# Patient Record
Sex: FEMALE | Race: WHITE | NOT HISPANIC OR LATINO | Employment: OTHER | URBAN - METROPOLITAN AREA
[De-identification: names, ages, dates, MRNs, and addresses within clinical notes are randomized per-mention and may not be internally consistent; named-entity substitution may affect disease eponyms.]

---

## 2017-01-05 RX ORDER — AMLODIPINE BESYLATE 5 MG/1
TABLET ORAL
Qty: 90 TABLET | Refills: 0 | Status: SHIPPED | OUTPATIENT
Start: 2017-01-05 | End: 2017-03-06 | Stop reason: SDUPTHER

## 2017-01-23 DIAGNOSIS — R42 VERTIGO: ICD-10-CM

## 2017-01-23 RX ORDER — MECLIZINE HCL 12.5 MG 12.5 MG/1
TABLET ORAL
Qty: 30 TABLET | Refills: 1 | Status: SHIPPED | OUTPATIENT
Start: 2017-01-23 | End: 2018-02-08 | Stop reason: SDUPTHER

## 2017-02-22 ENCOUNTER — OFFICE VISIT (OUTPATIENT)
Dept: INTERNAL MEDICINE | Facility: CLINIC | Age: 67
End: 2017-02-22
Payer: COMMERCIAL

## 2017-02-22 VITALS
HEART RATE: 83 BPM | WEIGHT: 257.94 LBS | DIASTOLIC BLOOD PRESSURE: 82 MMHG | SYSTOLIC BLOOD PRESSURE: 120 MMHG | RESPIRATION RATE: 18 BRPM | OXYGEN SATURATION: 96 % | BODY MASS INDEX: 42.98 KG/M2 | TEMPERATURE: 98 F | HEIGHT: 65 IN

## 2017-02-22 DIAGNOSIS — R50.9 FEVER, UNSPECIFIED FEVER CAUSE: Primary | ICD-10-CM

## 2017-02-22 DIAGNOSIS — J02.9 PHARYNGITIS, UNSPECIFIED ETIOLOGY: ICD-10-CM

## 2017-02-22 LAB
FLUAV AG SPEC QL IA: NEGATIVE
FLUBV AG SPEC QL IA: NEGATIVE
SPECIMEN SOURCE: NORMAL

## 2017-02-22 PROCEDURE — 87070 CULTURE OTHR SPECIMN AEROBIC: CPT

## 2017-02-22 PROCEDURE — 3079F DIAST BP 80-89 MM HG: CPT | Mod: S$GLB,,, | Performed by: INTERNAL MEDICINE

## 2017-02-22 PROCEDURE — 1160F RVW MEDS BY RX/DR IN RCRD: CPT | Mod: S$GLB,,, | Performed by: INTERNAL MEDICINE

## 2017-02-22 PROCEDURE — 87400 INFLUENZA A/B EACH AG IA: CPT | Mod: 59,PO

## 2017-02-22 PROCEDURE — 3074F SYST BP LT 130 MM HG: CPT | Mod: S$GLB,,, | Performed by: INTERNAL MEDICINE

## 2017-02-22 PROCEDURE — 99213 OFFICE O/P EST LOW 20 MIN: CPT | Mod: S$GLB,,, | Performed by: INTERNAL MEDICINE

## 2017-02-22 PROCEDURE — 99999 PR PBB SHADOW E&M-EST. PATIENT-LVL III: CPT | Mod: PBBFAC,,, | Performed by: INTERNAL MEDICINE

## 2017-02-22 RX ORDER — AZELASTINE 1 MG/ML
1 SPRAY, METERED NASAL 2 TIMES DAILY
Qty: 30 ML | Refills: 11 | Status: SHIPPED | OUTPATIENT
Start: 2017-02-22 | End: 2020-01-21

## 2017-02-22 NOTE — PROGRESS NOTES
HISTORY OF PRESENT ILLNESS:  PtSherry is a 66 y.o. female presents with complaint of sore throat that began yesterday AM.  Had her grandson that had Strept throat with her.  This am had chills.  Took tylenol this AM.  There is also influenza in her family now.      ROS:  GENERAL: No fever, chills, fatigability or weight loss.  SKIN: No rashes, itching or changes in color or texture of skin.  HEAD: No headaches or recent head trauma.  EARS: Denies ear pain, discharge or vertigo.  NOSE: No loss of smell, no epistaxis; positive postnasal drip.  MOUTH & THROAT: No hoarseness or change in voice. No excessive gum bleeding.  NODES: Denies swollen glands.  CHEST: Denies LEON, cyanosis, wheezing, cough and sputum production.  CARDIOVASCULAR: Denies chest pain, PND, orthopnea or reduced exercise tolerance.  ABDOMEN: Appetite fine. No weight loss. Denies constipation, diarrhea, abdominal pain, hematemesis or blood in stool.  URINARY: No flank pain, dysuria or hematuria.  PERIPHERAL VASCULAR: No claudication or cyanosis. No edema.  MUSCULOSKELETAL: No joint stiffness or swelling. Denies back pain.  NEUROLOGIC: Denies numbness    PE:   Vitals:   Vitals:    02/22/17 1146   BP: 120/82   Pulse: 83   Resp: 18   Temp: 98.3 °F (36.8 °C)     GENERAL: no acute distress, A&Ox3, comfortable.  Female with body mass index of 43   HEENT: tympanic membranes clear, nasal mucosa pink, positive congestion; no pharyngeal erythema or exudate  NECK: supple, no cervical lymphadenopathy, no thyromegaly; no supraclavicular nodes;   CHEST:  clear to auscultation bilaterally, no crackles or wheeze; no increased work of breathing;  CARDIOVASCULAR: regular rate and rhythm, no rubs, murmurs or gallops.  ABDOMEN: normal bowel sounds, soft non-tender, non-distended; no palpable organomegaly;   EXT: no clubbing, cyanosis or edema.     ASSESSMENT/PLAN:    Have explained to patient probable viral etiology.  Have suggested that she try cold-eeze lozenges.  She is to  follow-up if she develops any yellow or green sputum.    Fever, unspecified fever cause  -     THROAT SCREEN, RAPID  -     Influenza antigen Nasopharyngeal Swab; Future; Expected date: 2/22/17    Pharyngitis, unspecified etiology  -     THROAT SCREEN, RAPID  -     Influenza antigen Nasopharyngeal Swab; Future; Expected date: 2/22/17  -     Throat culture  -     azelastine (ASTELIN) 137 mcg (0.1 %) nasal spray; 1 spray (137 mcg total) by Nasal route 2 (two) times daily.  Dispense: 30 mL; Refill: 11      Call if condition changes or worsens.

## 2017-02-22 NOTE — MR AVS SNAPSHOT
[facility]  [address] [place] [address]  Phone: [phone]  Fax: [phone]                  Nancy Figueroa   2017 11:40 AM   Office Visit    Description:  Female : 1950   Provider:  Akilah Kumar MD   Department:  [facility]           Reason for Visit     Sore Throat           Diagnoses this Visit        Comments    Fever, unspecified fever cause    -  Primary     Pharyngitis, unspecified etiology                To Do List           Future Appointments        Provider Department Dept Phone    3/6/2017 9:20 AM Akilah Kumar MD [facility] [phone]      Goals (5 Years of Data)     None       These Medications        Disp Refills Start End    azelastine (ASTELIN) 137 mcg (0.1 %) nasal spray 30 mL 11 2017     1 spray (137 mcg total) by Nasal route 2 (two) times daily. - Nasal    Pharmacy: [facility] [address] Ph #: [phone]         [facility] On Call     [facility] On Call Nurse Care Line -  Assistance  Registered nurses in the [facility] provide clinical advisement, health education, appointment booking, and other advisory services.  Call for this free service at [phone].             Medications           Message regarding Medications     Verify the changes and/or additions to your medication regime listed below are the same as discussed with your clinician today.  If any of these changes or additions are incorrect, please notify your healthcare provider.        START taking these NEW medications        Refills    azelastine (ASTELIN) 137 mcg (0.1 %) nasal spray 11    Si spray (137 mcg total) by Nasal route 2 (two) times daily.    Class: Normal    Route: Nasal           Verify that the below list of medications is an accurate representation of the medications you are currently taking.  If none reported, the list may be blank. If  "incorrect, please contact your healthcare provider. Carry this list with you in case of emergency.           Current Medications     amlodipine (NORVASC) 5 MG tablet TAKE 1 TABLET DAILY    levothyroxine (SYNTHROID) 112 MCG tablet Take 1 tablet (112 mcg total) by mouth before breakfast.    losartan-hydrochlorothiazide 100-25 mg (HYZAAR) 100-25 mg per tablet Take 1 tablet by mouth once daily.    meclizine (ANTIVERT) 12.5 mg tablet 1 tablet 3-4x daily as needed.    azelastine (ASTELIN) 137 mcg (0.1 %) nasal spray 1 spray (137 mcg total) by Nasal route 2 (two) times daily.           Clinical Reference Information           Your Vitals Were     BP Pulse Temp Resp Height Weight    120/82 83 98.3 °F (36.8 °C) 18 5' 4.5" (1.638 m) 117 kg (257 lb 15 oz)    SpO2 BMI             96% 43.59 kg/m2         Blood Pressure          Most Recent Value    BP  120/82      Allergies as of 2/22/2017     No Known Allergies      Immunizations Administered on Date of Encounter - 2/22/2017     None      Orders Placed During Today's Visit      Normal Orders This Visit    Influenza antigen Nasopharyngeal Swab     Throat culture     THROAT SCREEN, RAPID     Future Labs/Procedures Expected by Expires    Influenza antigen Nasopharyngeal Swab  2/22/2017 2/22/2018      Language Assistance Services     ATTENTION: Language assistance services are available, free of charge. Please call [phone].      ATENCIÓN: Si habla nadira, tiene a walters disposición servicios gratuitos de asistencia lingüística. Llame al [phone].     CHÚ Ý: N?u b?n nói Ti?ng Vi?t, có các d?ch v? h? tr? ngôn ng? mi?n phí dành cho b?n. G?i s? [phone].         [facility] complies with applicable Federal civil rights laws and does not discriminate on the basis of race, color, national origin, age, disability, or sex.        "

## 2017-02-24 LAB — BACTERIA THROAT CULT: NORMAL

## 2017-02-28 RX ORDER — LEVOTHYROXINE SODIUM 112 UG/1
TABLET ORAL
Qty: 90 TABLET | Refills: 0 | Status: SHIPPED | OUTPATIENT
Start: 2017-02-28 | End: 2017-03-06 | Stop reason: SDUPTHER

## 2017-03-06 ENCOUNTER — OFFICE VISIT (OUTPATIENT)
Dept: INTERNAL MEDICINE | Facility: CLINIC | Age: 67
End: 2017-03-06
Payer: COMMERCIAL

## 2017-03-06 VITALS
WEIGHT: 259.06 LBS | SYSTOLIC BLOOD PRESSURE: 112 MMHG | HEIGHT: 65 IN | OXYGEN SATURATION: 99 % | BODY MASS INDEX: 43.16 KG/M2 | RESPIRATION RATE: 18 BRPM | DIASTOLIC BLOOD PRESSURE: 84 MMHG | HEART RATE: 78 BPM

## 2017-03-06 DIAGNOSIS — E03.9 HYPOTHYROIDISM, UNSPECIFIED TYPE: ICD-10-CM

## 2017-03-06 DIAGNOSIS — Z00.00 HEALTH CARE MAINTENANCE: ICD-10-CM

## 2017-03-06 DIAGNOSIS — E66.01 MORBID OBESITY WITH BMI OF 40.0-44.9, ADULT: ICD-10-CM

## 2017-03-06 DIAGNOSIS — I10 ESSENTIAL HYPERTENSION: Primary | ICD-10-CM

## 2017-03-06 PROCEDURE — 3074F SYST BP LT 130 MM HG: CPT | Mod: S$GLB,,, | Performed by: INTERNAL MEDICINE

## 2017-03-06 PROCEDURE — 90471 IMMUNIZATION ADMIN: CPT | Mod: S$GLB,,, | Performed by: INTERNAL MEDICINE

## 2017-03-06 PROCEDURE — 3079F DIAST BP 80-89 MM HG: CPT | Mod: S$GLB,,, | Performed by: INTERNAL MEDICINE

## 2017-03-06 PROCEDURE — 1160F RVW MEDS BY RX/DR IN RCRD: CPT | Mod: S$GLB,,, | Performed by: INTERNAL MEDICINE

## 2017-03-06 PROCEDURE — 90715 TDAP VACCINE 7 YRS/> IM: CPT | Mod: S$GLB,,, | Performed by: INTERNAL MEDICINE

## 2017-03-06 PROCEDURE — 99999 PR PBB SHADOW E&M-EST. PATIENT-LVL III: CPT | Mod: PBBFAC,,, | Performed by: INTERNAL MEDICINE

## 2017-03-06 PROCEDURE — 99213 OFFICE O/P EST LOW 20 MIN: CPT | Mod: 25,S$GLB,, | Performed by: INTERNAL MEDICINE

## 2017-03-06 RX ORDER — LOSARTAN POTASSIUM AND HYDROCHLOROTHIAZIDE 25; 100 MG/1; MG/1
1 TABLET ORAL DAILY
Qty: 90 TABLET | Refills: 1 | Status: SHIPPED | OUTPATIENT
Start: 2017-03-06 | End: 2017-07-26 | Stop reason: SDUPTHER

## 2017-03-06 RX ORDER — LEVOTHYROXINE SODIUM 112 UG/1
TABLET ORAL
Qty: 90 TABLET | Refills: 1 | Status: SHIPPED | OUTPATIENT
Start: 2017-03-06 | End: 2017-08-15 | Stop reason: SDUPTHER

## 2017-03-06 RX ORDER — AMLODIPINE BESYLATE 5 MG/1
5 TABLET ORAL DAILY
Qty: 90 TABLET | Refills: 1 | Status: SHIPPED | OUTPATIENT
Start: 2017-03-06 | End: 2017-07-26 | Stop reason: SDUPTHER

## 2017-03-06 NOTE — PROGRESS NOTES
HISTORY OF PRESENT ILLNESS:  Pt. is a 66 y.o. female presents for monitoring of her HTN, hypothyroidism, morbid obesity.  Her son has Gilbert's disease.  Her B/P is in control on current meds today.  She is due for Tdap.  She has lost 5 lbs since appt. 6/3/16.  She will starting Medicare with her birthday this year.    Lab Results   Component Value Date    WBC 5.46 05/19/2016    HGB 12.2 05/19/2016    HCT 38.5 05/19/2016     05/19/2016    CHOL 212 (H) 05/19/2016    TRIG 107 05/19/2016    HDL 55 05/19/2016    ALT 16 05/19/2016    AST 15 05/19/2016     05/19/2016    K 4.1 05/19/2016     05/19/2016    CREATININE 0.8 05/19/2016    BUN 14 05/19/2016    CO2 27 05/19/2016    TSH 2.710 07/01/2016     Lab Results   Component Value Date    LDLCALC 135.6 05/19/2016              ROS:  GENERAL: No fever, chills, fatigability or weight loss.  SKIN: No rashes, itching or changes in color or texture of skin.  HEAD: No headaches or recent head trauma.  EARS: Denies ear pain, discharge or vertigo.  NOSE: No loss of smell, no epistaxis or postnasal drip.  MOUTH & THROAT: No hoarseness or change in voice. No excessive gum bleeding.  NODES: Denies swollen glands.  CHEST: Denies LEON, cyanosis, wheezing, cough and sputum production.  CARDIOVASCULAR: Denies chest pain, PND, orthopnea or reduced exercise tolerance.  ABDOMEN: Appetite fine. No weight loss. Denies constipation, diarrhea, abdominal pain, hematemesis or blood in stool; occ GERD;  URINARY: No flank pain, dysuria or hematuria.  PERIPHERAL VASCULAR: No claudication or cyanosis. No edema.  MUSCULOSKELETAL: No joint stiffness or swelling. Denies back pain.  NEUROLOGIC: Denies numbness    PE:   Vitals:   Vitals:    03/06/17 0917   BP: 112/84   Pulse: 78   Resp: 18     GENERAL: no acute distress, A&Ox3, comfortable.  Female with BMI of 43  HEENT: tympanic membranes clear, nasal mucosa pink, no pharyngeal erythema or exudate  NECK: supple, no cervical  lymphadenopathy, no thyromegaly; no supraclavicular nodes;   CHEST:  clear to auscultation bilaterally, no crackles or wheeze; no increased work of breathing;  CARDIOVASCULAR: regular rate and rhythm, no rubs, murmurs or gallops.  ABDOMEN: normal bowel sounds, soft non-tender, non-distended; no palpable organomegaly;   EXT: no clubbing, cyanosis or edema.     ASSESSMENT/PLAN:    Essential hypertension  -     amlodipine (NORVASC) 5 MG tablet; Take 1 tablet (5 mg total) by mouth once daily.  Dispense: 90 tablet; Refill: 1  -     losartan-hydrochlorothiazide 100-25 mg (HYZAAR) 100-25 mg per tablet; Take 1 tablet by mouth once daily.  Dispense: 90 tablet; Refill: 1    Hypothyroidism, unspecified type  -     levothyroxine (SYNTHROID) 112 MCG tablet; TAKE 1 TABLET BEFORE BREAKFAST  Dispense: 90 tablet; Refill: 1    Morbid Obesity: stressed and encouraged weight loss;    Call if condition changes or worsens.

## 2017-04-22 PROBLEM — R20.0 LEFT ARM NUMBNESS: Status: ACTIVE | Noted: 2017-04-22

## 2017-04-22 PROBLEM — R07.9 CHEST PAIN WITH MODERATE RISK FOR CARDIAC ETIOLOGY: Status: ACTIVE | Noted: 2017-04-22

## 2017-04-22 PROBLEM — R07.9 CHEST PAIN: Status: ACTIVE | Noted: 2017-04-22

## 2017-04-23 PROBLEM — E87.6 ACUTE HYPOKALEMIA: Status: ACTIVE | Noted: 2017-04-23

## 2017-04-24 PROBLEM — I21.4 NSTEMI (NON-ST ELEVATED MYOCARDIAL INFARCTION): Status: ACTIVE | Noted: 2017-04-24

## 2017-04-25 ENCOUNTER — TELEPHONE (OUTPATIENT)
Dept: FAMILY MEDICINE | Facility: CLINIC | Age: 67
End: 2017-04-25

## 2017-04-25 NOTE — TELEPHONE ENCOUNTER
No, she needs a full 40 minute slot.  She should have had a TCC followup at [facility].  Also, when does she have followup with cards.

## 2017-04-25 NOTE — TELEPHONE ENCOUNTER
----- Message from Kim Motta sent at 4/25/2017 11:38 AM CDT -----  Patient is being released today from [facility] and needs a hospital follow up due to a heart attack and stents. Please call patient back at [phone]. The earliest I could find was in July.

## 2017-05-01 PROBLEM — I25.10 ATHEROSCLEROTIC HEART DISEASE OF NATIVE CORONARY ARTERY WITHOUT ANGINA PECTORIS: Status: ACTIVE | Noted: 2017-05-01

## 2017-05-03 ENCOUNTER — PATIENT MESSAGE (OUTPATIENT)
Dept: INTERNAL MEDICINE | Facility: CLINIC | Age: 67
End: 2017-05-03

## 2017-05-04 ENCOUNTER — PATIENT MESSAGE (OUTPATIENT)
Dept: FAMILY MEDICINE | Facility: CLINIC | Age: 67
End: 2017-05-04

## 2017-05-04 ENCOUNTER — TELEPHONE (OUTPATIENT)
Dept: FAMILY MEDICINE | Facility: CLINIC | Age: 67
End: 2017-05-04

## 2017-07-26 ENCOUNTER — OFFICE VISIT (OUTPATIENT)
Dept: CARDIOLOGY | Facility: CLINIC | Age: 67
End: 2017-07-26
Payer: MEDICARE

## 2017-07-26 VITALS
HEIGHT: 64 IN | SYSTOLIC BLOOD PRESSURE: 137 MMHG | DIASTOLIC BLOOD PRESSURE: 79 MMHG | WEIGHT: 256.19 LBS | HEART RATE: 78 BPM | BODY MASS INDEX: 43.74 KG/M2

## 2017-07-26 DIAGNOSIS — I21.4 NSTEMI (NON-ST ELEVATED MYOCARDIAL INFARCTION): ICD-10-CM

## 2017-07-26 DIAGNOSIS — Z95.5 STENTED CORONARY ARTERY: Chronic | ICD-10-CM

## 2017-07-26 DIAGNOSIS — I25.10 ATHEROSCLEROSIS OF NATIVE CORONARY ARTERY OF NATIVE HEART WITHOUT ANGINA PECTORIS: ICD-10-CM

## 2017-07-26 DIAGNOSIS — I10 ESSENTIAL HYPERTENSION: Primary | ICD-10-CM

## 2017-07-26 PROCEDURE — 99999 PR PBB SHADOW E&M-EST. PATIENT-LVL III: CPT | Mod: PBBFAC,,, | Performed by: INTERNAL MEDICINE

## 2017-07-26 PROCEDURE — 99214 OFFICE O/P EST MOD 30 MIN: CPT | Mod: S$PBB,,, | Performed by: INTERNAL MEDICINE

## 2017-07-26 PROCEDURE — 99213 OFFICE O/P EST LOW 20 MIN: CPT | Mod: PBBFAC,PO | Performed by: INTERNAL MEDICINE

## 2017-07-26 RX ORDER — METOPROLOL TARTRATE 25 MG/1
12.5 TABLET, FILM COATED ORAL 2 TIMES DAILY
Qty: 30 TABLET | Refills: 11 | Status: SHIPPED | OUTPATIENT
Start: 2017-07-26 | End: 2017-11-14

## 2017-07-26 RX ORDER — AMLODIPINE BESYLATE 5 MG/1
5 TABLET ORAL DAILY
Qty: 90 TABLET | Refills: 4 | Status: SHIPPED | OUTPATIENT
Start: 2017-07-26 | End: 2017-08-17 | Stop reason: SDUPTHER

## 2017-07-26 RX ORDER — LOSARTAN POTASSIUM AND HYDROCHLOROTHIAZIDE 25; 100 MG/1; MG/1
1 TABLET ORAL DAILY
Qty: 90 TABLET | Refills: 1 | Status: SHIPPED | OUTPATIENT
Start: 2017-07-26 | End: 2017-08-17 | Stop reason: SDUPTHER

## 2017-07-26 RX ORDER — ATORVASTATIN CALCIUM 10 MG/1
10 TABLET, FILM COATED ORAL NIGHTLY
Qty: 90 TABLET | Refills: 4 | Status: SHIPPED | OUTPATIENT
Start: 2017-07-26 | End: 2018-10-03 | Stop reason: SDUPTHER

## 2017-07-26 RX ORDER — CLOPIDOGREL BISULFATE 75 MG/1
75 TABLET ORAL DAILY
Qty: 90 TABLET | Refills: 4 | Status: SHIPPED | OUTPATIENT
Start: 2017-07-26 | End: 2018-10-03 | Stop reason: SDUPTHER

## 2017-07-26 NOTE — PROGRESS NOTES
Subjective:    Patient ID:  Nancy Figueroa is a 67 y.o. female who presents for follow-up of myocardial infarction (Post hosp f/u - ) and LHC (stent x1 04/24/17)      HPI  Here for follow up of CAD-PCI (4/17 BRYNN) after small NSTEMI. Patients states is doing well no chest pain, SOB or change in exertional tolerence. Patient does not exercise but remains very active with out change in exertional tolerance or chest pain.       Review of Systems   Constitution: Negative for malaise/fatigue.   Eyes: Negative for blurred vision.   Cardiovascular: Negative for chest pain, claudication, cyanosis, dyspnea on exertion, irregular heartbeat, leg swelling, near-syncope, orthopnea, palpitations, paroxysmal nocturnal dyspnea and syncope.   Respiratory: Negative for cough and shortness of breath.    Hematologic/Lymphatic: Does not bruise/bleed easily.   Musculoskeletal: Negative for back pain, falls, joint pain, muscle cramps, muscle weakness and myalgias.   Gastrointestinal: Negative for abdominal pain, change in bowel habit, nausea and vomiting.   Genitourinary: Negative for urgency.   Neurological: Negative for dizziness, focal weakness and light-headedness.        Objective:    Physical Exam   Constitutional: She is oriented to person, place, and time. She appears well-developed and well-nourished.   HENT:   Head: Normocephalic.   Eyes: Conjunctivae are normal.   Neck: Normal range of motion. Neck supple. No JVD present.   Cardiovascular: Normal rate, regular rhythm, normal heart sounds and intact distal pulses.    Pulses:       Carotid pulses are 2+ on the right side, and 2+ on the left side.       Radial pulses are 2+ on the right side, and 2+ on the left side.        Dorsalis pedis pulses are 2+ on the right side, and 2+ on the left side.        Posterior tibial pulses are 2+ on the right side, and 2+ on the left side.   Pulmonary/Chest: Effort normal and breath sounds normal.   Abdominal: Soft. Bowel sounds are normal.    Musculoskeletal: She exhibits no edema or tenderness.   Neurological: She is alert and oriented to person, place, and time. Gait normal.   Skin: Skin is warm, dry and intact. No cyanosis. Nails show no clubbing.   Psychiatric: She has a normal mood and affect. Her speech is normal and behavior is normal. Thought content normal.   Nursing note and vitals reviewed.            ..    Chemistry        Component Value Date/Time     04/23/2017 0230    K 3.3 (L) 04/23/2017 0230     04/23/2017 0230    CO2 26 04/23/2017 0230    BUN 23 (H) 04/23/2017 0230    CREATININE 0.67 04/23/2017 0230     (H) 04/23/2017 0230        Component Value Date/Time    CALCIUM 8.9 04/23/2017 0230    ALKPHOS 94 04/23/2017 0230    AST 14 04/23/2017 0230    ALT 30 04/23/2017 0230    BILITOT 1.0 04/23/2017 0230    ESTGFRAFRICA >60 04/23/2017 0230    EGFRNONAA >60 04/23/2017 0230            ..  Lab Results   Component Value Date    CHOL 193 04/25/2017    CHOL 203 (H) 04/23/2017    CHOL 212 (H) 05/19/2016     Lab Results   Component Value Date    HDL 52 04/25/2017    HDL 49 04/23/2017    HDL 55 05/19/2016     Lab Results   Component Value Date    LDLCALC 116.8 04/25/2017    LDLCALC 134.4 04/23/2017    LDLCALC 135.6 05/19/2016     Lab Results   Component Value Date    TRIG 121 04/25/2017    TRIG 98 04/23/2017    TRIG 107 05/19/2016     Lab Results   Component Value Date    CHOLHDL 26.9 04/25/2017    CHOLHDL 24.1 04/23/2017    CHOLHDL 25.9 05/19/2016     ..  Lab Results   Component Value Date    WBC 7.23 04/23/2017    HGB 11.5 (L) 04/23/2017    HCT 34.5 (L) 04/23/2017    MCV 84 04/23/2017     04/23/2017       Test(s) Reviewed  I have reviewed the following in detail:  [] Stress test   [x] Angiography   [x] Echocardiogram   [x] Labs   [x] Other:       Assessment:         ICD-10-CM ICD-9-CM   1. Essential hypertension I10 401.9   2. Atherosclerosis of native coronary artery of native heart without angina pectoris I25.10 414.01    3. Stented coronary artery Z95.5 V45.82   4. NSTEMI (non-ST elevated myocardial infarction) I21.4 410.70     Problem List Items Addressed This Visit     Atherosclerotic heart disease of native coronary artery without angina pectoris    Overview     4/17   Left main: normal   LAD: two diagonals  Less than 50%.  90% mid LAD  Circumflex:  minimal disease, less than 20%.  --  RCA: The vessel was large sized (dominant) with less than 20% stenosis.         Essential hypertension - Primary    NSTEMI (non-ST elevated myocardial infarction)    Overview     4/2017         Stented coronary artery (Chronic)    Overview     4/17 LAD-Synergy 3.0 x 28            Other Visit Diagnoses    None.          Plan:           Return to clinic 6 months   Aerobic exercise 5x's/wk. Heart healthy diet and risk factor modification.    See labs and med orders.  lipids

## 2017-08-14 DIAGNOSIS — Z12.31 OTHER SCREENING MAMMOGRAM: ICD-10-CM

## 2017-08-15 DIAGNOSIS — I10 ESSENTIAL HYPERTENSION: ICD-10-CM

## 2017-08-15 DIAGNOSIS — E03.9 HYPOTHYROIDISM, UNSPECIFIED TYPE: ICD-10-CM

## 2017-08-17 RX ORDER — AMLODIPINE BESYLATE 5 MG/1
TABLET ORAL
Qty: 90 TABLET | Refills: 1 | Status: SHIPPED | OUTPATIENT
Start: 2017-08-17 | End: 2017-11-14 | Stop reason: SDUPTHER

## 2017-08-17 RX ORDER — LOSARTAN POTASSIUM AND HYDROCHLOROTHIAZIDE 25; 100 MG/1; MG/1
TABLET ORAL
Qty: 90 TABLET | Refills: 1 | Status: SHIPPED | OUTPATIENT
Start: 2017-08-17 | End: 2017-11-14

## 2017-08-17 RX ORDER — LEVOTHYROXINE SODIUM 112 UG/1
TABLET ORAL
Qty: 90 TABLET | Refills: 1 | Status: SHIPPED | OUTPATIENT
Start: 2017-08-17 | End: 2017-08-28 | Stop reason: SDUPTHER

## 2017-08-28 ENCOUNTER — TELEPHONE (OUTPATIENT)
Dept: FAMILY MEDICINE | Facility: CLINIC | Age: 67
End: 2017-08-28

## 2017-08-28 DIAGNOSIS — E03.9 HYPOTHYROIDISM, UNSPECIFIED TYPE: ICD-10-CM

## 2017-08-28 RX ORDER — LEVOTHYROXINE SODIUM 112 UG/1
112 TABLET ORAL
Qty: 90 TABLET | Refills: 1 | Status: SHIPPED | OUTPATIENT
Start: 2017-08-28 | End: 2018-03-11 | Stop reason: SDUPTHER

## 2017-09-28 ENCOUNTER — HOSPITAL ENCOUNTER (OUTPATIENT)
Dept: RADIOLOGY | Facility: HOSPITAL | Age: 67
Discharge: HOME | End: 2017-09-28
Attending: INTERNAL MEDICINE
Payer: MEDICARE

## 2017-09-28 DIAGNOSIS — Z12.31 OTHER SCREENING MAMMOGRAM: ICD-10-CM

## 2017-09-28 PROCEDURE — 77067 SCR MAMMO BI INCL CAD: CPT | Mod: TC

## 2017-09-28 PROCEDURE — 77063 BREAST TOMOSYNTHESIS BI: CPT | Mod: 26,,, | Performed by: RADIOLOGY

## 2017-09-28 PROCEDURE — 77067 SCR MAMMO BI INCL CAD: CPT | Mod: 26,,, | Performed by: RADIOLOGY

## 2017-10-02 ENCOUNTER — TELEPHONE (OUTPATIENT)
Dept: RADIOLOGY | Facility: HOSPITAL | Age: 67
End: 2017-10-02

## 2017-10-06 ENCOUNTER — HOSPITAL ENCOUNTER (OUTPATIENT)
Dept: RADIOLOGY | Facility: HOSPITAL | Age: 67
Discharge: HOME | End: 2017-10-06
Attending: INTERNAL MEDICINE
Payer: MEDICARE

## 2017-10-06 DIAGNOSIS — R92.8 ABNORMAL MAMMOGRAM OF BOTH BREASTS: ICD-10-CM

## 2017-10-06 PROCEDURE — 77066 DX MAMMO INCL CAD BI: CPT | Mod: 26,,, | Performed by: RADIOLOGY

## 2017-10-06 PROCEDURE — 77062 BREAST TOMOSYNTHESIS BI: CPT | Mod: TC

## 2017-10-06 PROCEDURE — 76642 ULTRASOUND BREAST LIMITED: CPT | Mod: 26,50,, | Performed by: RADIOLOGY

## 2017-10-06 PROCEDURE — 77062 BREAST TOMOSYNTHESIS BI: CPT | Mod: 26,,, | Performed by: RADIOLOGY

## 2017-10-06 PROCEDURE — 76642 ULTRASOUND BREAST LIMITED: CPT | Mod: TC,50,PO

## 2017-10-09 ENCOUNTER — TELEPHONE (OUTPATIENT)
Dept: CARDIOLOGY | Facility: CLINIC | Age: 67
End: 2017-10-09

## 2017-10-11 ENCOUNTER — TELEPHONE (OUTPATIENT)
Dept: CARDIOLOGY | Facility: CLINIC | Age: 67
End: 2017-10-11

## 2017-10-11 NOTE — TELEPHONE ENCOUNTER
----- Message from Shayy Caputo sent at 10/11/2017  3:13 PM CDT -----  Contact: [phone]  Patient is returning nurse's phone call, to reschedule since dr will be out, need sooner appt than the one offered.  Please call patient back at [phone].

## 2017-10-23 ENCOUNTER — TELEPHONE (OUTPATIENT)
Dept: CARDIOLOGY | Facility: CLINIC | Age: 67
End: 2017-10-23

## 2017-10-23 ENCOUNTER — LAB VISIT (OUTPATIENT)
Dept: LAB | Facility: HOSPITAL | Age: 67
End: 2017-10-23
Attending: INTERNAL MEDICINE
Payer: MEDICARE

## 2017-10-23 DIAGNOSIS — Z95.5 STENTED CORONARY ARTERY: Chronic | ICD-10-CM

## 2017-10-23 DIAGNOSIS — I10 ESSENTIAL HYPERTENSION: ICD-10-CM

## 2017-10-23 LAB
ALBUMIN SERPL BCP-MCNC: 3.7 G/DL
ALP SERPL-CCNC: 115 U/L
ALT SERPL W/O P-5'-P-CCNC: 20 U/L
ANION GAP SERPL CALC-SCNC: 8 MMOL/L
AST SERPL-CCNC: 19 U/L
BASOPHILS # BLD AUTO: 0.06 K/UL
BASOPHILS NFR BLD: 1 %
BILIRUB SERPL-MCNC: 1.7 MG/DL
BUN SERPL-MCNC: 13 MG/DL
CALCIUM SERPL-MCNC: 9.6 MG/DL
CHLORIDE SERPL-SCNC: 104 MMOL/L
CHOLEST SERPL-MCNC: 151 MG/DL
CHOLEST/HDLC SERPL: 3 {RATIO}
CK SERPL-CCNC: 55 U/L
CO2 SERPL-SCNC: 31 MMOL/L
CREAT SERPL-MCNC: 0.8 MG/DL
DIFFERENTIAL METHOD: NORMAL
EOSINOPHIL # BLD AUTO: 0.1 K/UL
EOSINOPHIL NFR BLD: 1.7 %
ERYTHROCYTE [DISTWIDTH] IN BLOOD BY AUTOMATED COUNT: 13 %
EST. GFR  (AFRICAN AMERICAN): >60 ML/MIN/1.73 M^2
EST. GFR  (NON AFRICAN AMERICAN): >60 ML/MIN/1.73 M^2
GLUCOSE SERPL-MCNC: 103 MG/DL
HCT VFR BLD AUTO: 39.6 %
HDLC SERPL-MCNC: 50 MG/DL
HDLC SERPL: 33.1 %
HGB BLD-MCNC: 13 G/DL
IMM GRANULOCYTES # BLD AUTO: 0.02 K/UL
IMM GRANULOCYTES NFR BLD AUTO: 0.3 %
LDLC SERPL CALC-MCNC: 80.4 MG/DL
LYMPHOCYTES # BLD AUTO: 1.8 K/UL
LYMPHOCYTES NFR BLD: 29.8 %
MCH RBC QN AUTO: 28.3 PG
MCHC RBC AUTO-ENTMCNC: 32.8 G/DL
MCV RBC AUTO: 86 FL
MONOCYTES # BLD AUTO: 0.5 K/UL
MONOCYTES NFR BLD: 8.4 %
NEUTROPHILS # BLD AUTO: 3.6 K/UL
NEUTROPHILS NFR BLD: 58.8 %
NONHDLC SERPL-MCNC: 101 MG/DL
NRBC BLD-RTO: 0 /100 WBC
PLATELET # BLD AUTO: 315 K/UL
PMV BLD AUTO: 11.5 FL
POTASSIUM SERPL-SCNC: 3.7 MMOL/L
PROT SERPL-MCNC: 7.7 G/DL
RBC # BLD AUTO: 4.59 M/UL
SODIUM SERPL-SCNC: 143 MMOL/L
TRIGL SERPL-MCNC: 103 MG/DL
WBC # BLD AUTO: 6.05 K/UL

## 2017-10-23 PROCEDURE — 80053 COMPREHEN METABOLIC PANEL: CPT

## 2017-10-23 PROCEDURE — 36415 COLL VENOUS BLD VENIPUNCTURE: CPT | Mod: PO

## 2017-10-23 PROCEDURE — 82550 ASSAY OF CK (CPK): CPT

## 2017-10-23 PROCEDURE — 80061 LIPID PANEL: CPT

## 2017-10-23 PROCEDURE — 85025 COMPLETE CBC W/AUTO DIFF WBC: CPT

## 2017-10-23 NOTE — TELEPHONE ENCOUNTER
----- Message from Zachariah Gary MD sent at 10/23/2017  2:24 PM CDT -----  Labs results are with in normal parameters for you. Continue current meds.

## 2017-10-24 ENCOUNTER — TELEPHONE (OUTPATIENT)
Dept: CARDIOLOGY | Facility: CLINIC | Age: 67
End: 2017-10-24

## 2017-10-24 NOTE — TELEPHONE ENCOUNTER
----- Message from Zachariah Gary MD sent at 10/24/2017 12:41 PM CDT -----  Labs results are with in normal parameters for you. Continue current meds.

## 2017-11-14 ENCOUNTER — OFFICE VISIT (OUTPATIENT)
Dept: CARDIOLOGY | Facility: CLINIC | Age: 67
End: 2017-11-14
Payer: MEDICARE

## 2017-11-14 VITALS
HEIGHT: 64 IN | SYSTOLIC BLOOD PRESSURE: 166 MMHG | WEIGHT: 243.81 LBS | HEART RATE: 75 BPM | BODY MASS INDEX: 41.62 KG/M2 | DIASTOLIC BLOOD PRESSURE: 77 MMHG

## 2017-11-14 DIAGNOSIS — I25.10 ATHEROSCLEROSIS OF NATIVE CORONARY ARTERY OF NATIVE HEART WITHOUT ANGINA PECTORIS: ICD-10-CM

## 2017-11-14 DIAGNOSIS — I21.4 NSTEMI (NON-ST ELEVATED MYOCARDIAL INFARCTION): ICD-10-CM

## 2017-11-14 DIAGNOSIS — I10 ESSENTIAL HYPERTENSION: ICD-10-CM

## 2017-11-14 DIAGNOSIS — Z95.5 STENTED CORONARY ARTERY: Primary | Chronic | ICD-10-CM

## 2017-11-14 PROCEDURE — 99999 PR PBB SHADOW E&M-EST. PATIENT-LVL III: CPT | Mod: PBBFAC,,, | Performed by: INTERNAL MEDICINE

## 2017-11-14 PROCEDURE — 99213 OFFICE O/P EST LOW 20 MIN: CPT | Mod: PBBFAC,PO | Performed by: INTERNAL MEDICINE

## 2017-11-14 PROCEDURE — 99214 OFFICE O/P EST MOD 30 MIN: CPT | Mod: S$PBB,,, | Performed by: INTERNAL MEDICINE

## 2017-11-14 RX ORDER — VALSARTAN AND HYDROCHLOROTHIAZIDE 320; 25 MG/1; MG/1
1 TABLET, FILM COATED ORAL EVERY MORNING
Qty: 90 TABLET | Refills: 4 | Status: SHIPPED | OUTPATIENT
Start: 2017-11-14 | End: 2018-11-25 | Stop reason: SDUPTHER

## 2017-11-14 RX ORDER — AMLODIPINE BESYLATE 2.5 MG/1
2.5 TABLET ORAL DAILY
Qty: 90 TABLET | Refills: 4 | Status: SHIPPED | OUTPATIENT
Start: 2017-11-14 | End: 2018-03-27

## 2017-11-14 RX ORDER — CARVEDILOL 6.25 MG/1
6.25 TABLET ORAL 2 TIMES DAILY
Qty: 180 TABLET | Refills: 5 | Status: SHIPPED | OUTPATIENT
Start: 2017-11-14 | End: 2018-11-25 | Stop reason: SDUPTHER

## 2017-11-14 NOTE — PROGRESS NOTES
Subjective:    Patient ID:  Nancy Figueroa is a 67 y.o. female who presents for follow-up of Hypertension (3 month f/u - review labs )      HPI  Here for follow up of CAD-PCI (4/17 BRYNN) after small NSTEMI. Patients states is doing well no chest pain, SOB or change in exertional tolerence. Patient is exercising regularly with out symptoms.    Review of Systems   Constitution: Negative for malaise/fatigue.   Eyes: Negative for blurred vision.   Cardiovascular: Negative for chest pain, claudication, cyanosis, dyspnea on exertion, irregular heartbeat, leg swelling, near-syncope, orthopnea, palpitations, paroxysmal nocturnal dyspnea and syncope.   Respiratory: Negative for cough and shortness of breath.    Hematologic/Lymphatic: Does not bruise/bleed easily.   Musculoskeletal: Negative for back pain, falls, joint pain, muscle cramps, muscle weakness and myalgias.   Gastrointestinal: Negative for abdominal pain, change in bowel habit, nausea and vomiting.   Genitourinary: Negative for urgency.   Neurological: Negative for dizziness, focal weakness and light-headedness.        Objective:    Physical Exam   Constitutional: She is oriented to person, place, and time. She appears well-developed and well-nourished.   HENT:   Head: Normocephalic.   Eyes: Conjunctivae are normal.   Neck: Normal range of motion. Neck supple. No JVD present.   Cardiovascular: Normal rate, regular rhythm, normal heart sounds and intact distal pulses.    Pulses:       Carotid pulses are 2+ on the right side, and 2+ on the left side.       Radial pulses are 2+ on the right side, and 2+ on the left side.        Dorsalis pedis pulses are 2+ on the right side, and 2+ on the left side.        Posterior tibial pulses are 2+ on the right side, and 2+ on the left side.   Pulmonary/Chest: Effort normal and breath sounds normal.   Abdominal: Soft. Bowel sounds are normal.   Musculoskeletal: She exhibits no edema or tenderness.   Neurological: She is alert  and oriented to person, place, and time. Gait normal.   Skin: Skin is warm, dry and intact. No cyanosis. Nails show no clubbing.   Psychiatric: She has a normal mood and affect. Her speech is normal and behavior is normal. Thought content normal.   Nursing note and vitals reviewed.            ..    Chemistry        Component Value Date/Time     10/23/2017 0834    K 3.7 10/23/2017 0834     10/23/2017 0834    CO2 31 (H) 10/23/2017 0834    BUN 13 10/23/2017 0834    CREATININE 0.8 10/23/2017 0834     10/23/2017 0834        Component Value Date/Time    CALCIUM 9.6 10/23/2017 0834    ALKPHOS 115 10/23/2017 0834    AST 19 10/23/2017 0834    ALT 20 10/23/2017 0834    BILITOT 1.7 (H) 10/23/2017 0834    ESTGFRAFRICA >60.0 10/23/2017 0834    EGFRNONAA >60.0 10/23/2017 0834            ..  Lab Results   Component Value Date    CHOL 151 10/23/2017    CHOL 193 04/25/2017    CHOL 203 (H) 04/23/2017     Lab Results   Component Value Date    HDL 50 10/23/2017    HDL 52 04/25/2017    HDL 49 04/23/2017     Lab Results   Component Value Date    LDLCALC 80.4 10/23/2017    LDLCALC 116.8 04/25/2017    LDLCALC 134.4 04/23/2017     Lab Results   Component Value Date    TRIG 103 10/23/2017    TRIG 121 04/25/2017    TRIG 98 04/23/2017     Lab Results   Component Value Date    CHOLHDL 33.1 10/23/2017    CHOLHDL 26.9 04/25/2017    CHOLHDL 24.1 04/23/2017     ..  Lab Results   Component Value Date    WBC 6.05 10/23/2017    HGB 13.0 10/23/2017    HCT 39.6 10/23/2017    MCV 86 10/23/2017     10/23/2017       Test(s) Reviewed  I have reviewed the following in detail:  [] Stress test   [] Angiography   [x] Echocardiogram   [x] Labs   [] Other:       Assessment:         ICD-10-CM ICD-9-CM   1. Stented coronary artery Z95.5 V45.82   2. Atherosclerosis of native coronary artery of native heart without angina pectoris I25.10 414.01   3. NSTEMI (non-ST elevated myocardial infarction) I21.4 410.70   4. Essential hypertension I10  401.9     Problem List Items Addressed This Visit     Atherosclerotic heart disease of native coronary artery without angina pectoris    Overview     4/17   Left main: normal   LAD: two diagonals  Less than 50%.  90% mid LAD  Circumflex:  minimal disease, less than 20%.  --  RCA: The vessel was large sized (dominant) with less than 20% stenosis.         Essential hypertension    NSTEMI (non-ST elevated myocardial infarction)    Overview     4/2017         Stented coronary artery - Primary (Chronic)    Overview     4/17 LAD-Synergy 3.0 x 28                 Plan:         Return to clinic 9 months   Aerobic exercise 5x's/wk. Heart healthy diet and risk factor modification.    See labs and med orders.  Change to valsartan/coreg

## 2017-11-28 ENCOUNTER — OFFICE VISIT (OUTPATIENT)
Dept: OPTOMETRY | Facility: CLINIC | Age: 67
End: 2017-11-28
Payer: MEDICARE

## 2017-11-28 DIAGNOSIS — Z13.5 GLAUCOMA SCREENING: ICD-10-CM

## 2017-11-28 DIAGNOSIS — H52.203 ASTIGMATISM WITH PRESBYOPIA, BILATERAL: ICD-10-CM

## 2017-11-28 DIAGNOSIS — Z46.0 CONTACT LENS/GLASSES FITTING: ICD-10-CM

## 2017-11-28 DIAGNOSIS — Z46.0 CONTACT LENS/GLASSES FITTING: Primary | ICD-10-CM

## 2017-11-28 DIAGNOSIS — H52.4 ASTIGMATISM WITH PRESBYOPIA, BILATERAL: ICD-10-CM

## 2017-11-28 DIAGNOSIS — H25.13 NUCLEAR SCLEROSIS, BILATERAL: Primary | ICD-10-CM

## 2017-11-28 DIAGNOSIS — H40.039 ANATOMICAL NARROW ANGLE: ICD-10-CM

## 2017-11-28 DIAGNOSIS — H43.393 VITREOUS FLOATERS, BILATERAL: ICD-10-CM

## 2017-11-28 PROCEDURE — 92310 CONTACT LENS FITTING OU: CPT | Mod: ,,, | Performed by: OPTOMETRIST

## 2017-11-28 PROCEDURE — 99999 PR PBB SHADOW E&M-EST. PATIENT-LVL III: CPT | Mod: PBBFAC,,, | Performed by: OPTOMETRIST

## 2017-11-28 PROCEDURE — 92020 GONIOSCOPY: CPT | Mod: PBBFAC,PO | Performed by: OPTOMETRIST

## 2017-11-28 PROCEDURE — 92004 COMPRE OPH EXAM NEW PT 1/>: CPT | Mod: S$PBB,,, | Performed by: OPTOMETRIST

## 2017-11-28 PROCEDURE — 92020 GONIOSCOPY: CPT | Mod: S$PBB,,, | Performed by: OPTOMETRIST

## 2017-11-28 PROCEDURE — 99499 UNLISTED E&M SERVICE: CPT | Mod: S$PBB,,, | Performed by: OPTOMETRIST

## 2017-11-28 PROCEDURE — 99213 OFFICE O/P EST LOW 20 MIN: CPT | Mod: PBBFAC,PO | Performed by: OPTOMETRIST

## 2017-11-28 NOTE — PATIENT INSTRUCTIONS
Early Cataracts--not visually significant for surgery consultation.    What Are Cataracts?  A clear lens in the eye focuses light. This lets the eye see images sharply. With age, the lens slowly becomes cloudy. The cloudy lens is a cataract. A cataract scatters light and makes it hard for the eye to focus. Cataracts often form in both eyes. But one lens may cloud faster than the other.      The Aging of Your Lens    Your lens may cloud so slowly that you don`t notice any vision changes at first. But as the cataract gets worse, the eye has a harder time focusing. In early stages, glasses may help you see better. As the lens gets cloudier, your doctor may recommend surgery to restore your vision.  FLASHES / FLOATERS / POSTERIOR VITREOUS DETACHMENT    Call the clinic if you have any further changes in symptoms.  Including:  Increased numbers of floaters or flashing lights, dimness or darkness that moves through or stays constant in your vision, or any pain in the eye (s).            NARROW ANTERIOR CHAMBER ANGLE    The anterior chamber angle is the measured distance from the back surface of the cornea (the clear surface of the eye), to the iris (the color part of the eye).  Fluids that are normally produced inside the eye drain out through this so called angle. If the chamber angle is very narrow, it can impede this fluid outflow, and cause the eye pressure to rise.  This could lead to one type of glaucoma, Narrow Angle Glaucoma.    A test used to measure the angle depth is called GONIOSCOPY.  An instrument with a reflecting mirror is placed on the front of the eye to view the angle.     Very rarely, patients with narrow angle can have a sudden rise in eye pressure.  Symptoms of Acute Narrow Angle Glaucoma could include: intense ache/ pain, deep redness, cloudy / foggy/ blurred vision, headache and nausea.  Contact our office if you should ever experience any of these symptoms.      DRY EYES:  Use Over The Counter  "artificial tears as needed for dry eye symptoms.  Some common brands include:  Systane, Optive, and Refresh.  These drops can be used as frequently as desired, but may be most helpful use during long periods of concentrated work.  For example, reading / working at the computer.  Avoid drops that "get redness out", as these contain medication that may further irritate the eyes.    ALLERGY EYES / SYMPTOMS:    Over the counter medications include--Zaditor and Alaway  Use as directed 1-2 drops daily for symptoms of itching / watering eyes.  These drops will not help for dry eye or exposure symptoms.      "

## 2017-11-28 NOTE — PROGRESS NOTES
HPI     Annual Exam    Additional comments: DLE 11-14 (godfrey)    needs updated specs & clrx           Blurred Vision    Additional comments: at near only -- distance VA good           Spots and/or Floaters    Additional comments: OU occasionally -- no light flashes           Comments   Wears near cl's OS only  Feels need increased power  No other new oc/ vis complaint         Last edited by DORIAN Millan, OD on 11/28/2017  3:09 PM. (History)        ROS     Positive for: Eyes    Negative for: Constitutional, Gastrointestinal, Neurological, Skin,   Genitourinary, Musculoskeletal, HENT, Endocrine, Cardiovascular,   Respiratory, Psychiatric, Allergic/Imm, Heme/Lymph    Last edited by DORIAN Millan, OD on 11/28/2017  3:09 PM. (History)        Assessment /Plan     For exam results, see Encounter Report.    Nuclear sclerosis, bilateral    Vitreous floaters, bilateral    Glaucoma screening    Anatomical narrow angle    Astigmatism with presbyopia, bilateral    Contact lens/glasses fitting      1. Early changes, not vis sig for consult  2. RD precautions given  3. Low c/d, low IOP, narrow angle, neg fhx  Low suspect  4. Open 1+ OU gonio, monitor as NS increases  5. Updated specs rx gave copy  6. Updated clrx gave copy and new trials    Discussed and educated patient on current findings /plan.  RTC 1 year, prn if any changes / issues

## 2017-12-13 ENCOUNTER — IMMUNIZATION (OUTPATIENT)
Dept: FAMILY MEDICINE | Facility: CLINIC | Age: 67
End: 2017-12-13
Payer: MEDICARE

## 2017-12-13 PROCEDURE — G0008 ADMIN INFLUENZA VIRUS VAC: HCPCS | Mod: PBBFAC,PO

## 2018-01-23 DIAGNOSIS — Z95.5 STENTED CORONARY ARTERY: Chronic | ICD-10-CM

## 2018-01-23 DIAGNOSIS — I10 ESSENTIAL HYPERTENSION: ICD-10-CM

## 2018-01-24 RX ORDER — LOSARTAN POTASSIUM AND HYDROCHLOROTHIAZIDE 25; 100 MG/1; MG/1
TABLET ORAL
Qty: 90 TABLET | Refills: 4 | Status: SHIPPED | OUTPATIENT
Start: 2018-01-24 | End: 2018-03-27

## 2018-01-29 ENCOUNTER — OFFICE VISIT (OUTPATIENT)
Dept: FAMILY MEDICINE | Facility: CLINIC | Age: 68
End: 2018-01-29
Payer: MEDICARE

## 2018-01-29 ENCOUNTER — TELEPHONE (OUTPATIENT)
Dept: FAMILY MEDICINE | Facility: CLINIC | Age: 68
End: 2018-01-29

## 2018-01-29 ENCOUNTER — HOSPITAL ENCOUNTER (OUTPATIENT)
Dept: RADIOLOGY | Facility: HOSPITAL | Age: 68
Discharge: HOME | End: 2018-01-29
Attending: NURSE PRACTITIONER
Payer: MEDICARE

## 2018-01-29 VITALS
DIASTOLIC BLOOD PRESSURE: 82 MMHG | WEIGHT: 238.13 LBS | TEMPERATURE: 98 F | SYSTOLIC BLOOD PRESSURE: 132 MMHG | OXYGEN SATURATION: 97 % | HEIGHT: 64 IN | HEART RATE: 80 BPM | BODY MASS INDEX: 40.65 KG/M2

## 2018-01-29 DIAGNOSIS — R05.8 RECURRENT COUGH: ICD-10-CM

## 2018-01-29 DIAGNOSIS — J40 BRONCHITIS: ICD-10-CM

## 2018-01-29 DIAGNOSIS — R09.81 NASAL CONGESTION: ICD-10-CM

## 2018-01-29 PROBLEM — R20.0 LEFT ARM NUMBNESS: Status: RESOLVED | Noted: 2017-04-22 | Resolved: 2018-01-29

## 2018-01-29 PROCEDURE — 71046 X-RAY EXAM CHEST 2 VIEWS: CPT | Mod: TC,FY,PO

## 2018-01-29 PROCEDURE — 99215 OFFICE O/P EST HI 40 MIN: CPT | Mod: PBBFAC,PO | Performed by: NURSE PRACTITIONER

## 2018-01-29 PROCEDURE — 71046 X-RAY EXAM CHEST 2 VIEWS: CPT | Mod: 26,,, | Performed by: RADIOLOGY

## 2018-01-29 PROCEDURE — 99213 OFFICE O/P EST LOW 20 MIN: CPT | Mod: S$PBB,,, | Performed by: NURSE PRACTITIONER

## 2018-01-29 PROCEDURE — 99999 PR PBB SHADOW E&M-EST. PATIENT-LVL V: CPT | Mod: PBBFAC,,, | Performed by: NURSE PRACTITIONER

## 2018-01-29 RX ORDER — METOPROLOL TARTRATE 25 MG/1
TABLET, FILM COATED ORAL
Refills: 11 | COMMUNITY
Start: 2018-01-24 | End: 2018-03-27

## 2018-01-29 RX ORDER — AMLODIPINE BESYLATE 5 MG/1
TABLET ORAL
Refills: 4 | COMMUNITY
Start: 2018-01-24 | End: 2018-11-26

## 2018-01-29 RX ORDER — AZITHROMYCIN 250 MG/1
TABLET, FILM COATED ORAL
Qty: 6 TABLET | Refills: 0 | Status: SHIPPED | OUTPATIENT
Start: 2018-01-29 | End: 2018-02-03

## 2018-01-29 RX ORDER — FLUTICASONE PROPIONATE 50 MCG
1 SPRAY, SUSPENSION (ML) NASAL DAILY
Qty: 15.8 BOTTLE | Refills: 0 | Status: SHIPPED | OUTPATIENT
Start: 2018-01-29 | End: 2018-02-08

## 2018-01-29 RX ORDER — BENZONATATE 200 MG/1
200 CAPSULE ORAL 3 TIMES DAILY PRN
Qty: 30 CAPSULE | Refills: 0 | Status: SHIPPED | OUTPATIENT
Start: 2018-01-29 | End: 2018-02-08

## 2018-01-29 NOTE — TELEPHONE ENCOUNTER
"Spoke with pt on the telephone.    Notified pt that chest xray resulted: " No evidence of active chest disease". No pneumonia noted. She verbalized understanding.     "

## 2018-01-29 NOTE — PROGRESS NOTES
Subjective:       Patient ID: Nancy Figueroa is a 67 y.o. female.  First time seeing pt in clinic.  Last seen by TAJ Acharya NP on 3/7/2015.     Chief Complaint: Cough (Before Jose. pt says possible pneumonia); Sinusitis; and Fatigue     Pt reports she has no energy and feels like she has pneumonia. Recurrent cough since December, bad coughing spell thought she would throw up. Taking Mucinex, Krissy [place] Nighttime Cold medication OTC.      Cough   This is a new problem. The current episode started 1 to 4 weeks ago. The problem has been rapidly worsening. The problem occurs every few hours. The cough is productive of purulent sputum. Associated symptoms include chest pain (with cough), ear pain, heartburn, nasal congestion, postnasal drip, rhinorrhea and shortness of breath. Pertinent negatives include no chills, fever, hemoptysis, myalgias, sore throat (initially, but not anymore.), sweats or weight loss. She has tried OTC cough suppressant (Mucinex, Krissy Black [place] Nighttime Cold ) for the symptoms. The treatment provided moderate relief. Her past medical history is significant for pneumonia. There is no history of asthma, bronchiectasis, bronchitis, COPD, emphysema or environmental allergies.   Sinusitis   This is a new problem. The current episode started 1 to 4 weeks ago. The problem has been gradually worsening since onset. There has been no fever. Associated symptoms include congestion, coughing, ear pain, shortness of breath and sinus pressure (left side more than right. ). Pertinent negatives include no chills or sore throat (initially, but not anymore.). Treatments tried: Mucinex, Krissy Black [place] Nighttime Cold  The treatment provided moderate relief.   Fatigue   This is a new problem. The current episode started in the past 7 days. The problem has been unchanged. Associated symptoms include chest pain (with cough), congestion, coughing and fatigue. Pertinent negatives include no chills, fever, myalgias,  nausea, sore throat (initially, but not anymore.) or vomiting. Treatments tried: Mucinex, Krissy [place] Nighttime Cold  The treatment provided moderate relief.     Vitals:    01/29/18 0901   BP: 132/82   Pulse: 80   Temp: 98.3 °F (36.8 °C)     Review of Systems   Constitutional: Positive for fatigue. Negative for chills, fever and weight loss.   HENT: Positive for congestion, ear pain, postnasal drip, rhinorrhea and sinus pressure (left side more than right. ). Negative for sore throat (initially, but not anymore.) and voice change.    Eyes: Negative for visual disturbance.   Respiratory: Positive for cough and shortness of breath. Negative for hemoptysis and chest tightness.    Cardiovascular: Positive for chest pain (with cough).   Gastrointestinal: Positive for heartburn. Negative for nausea and vomiting.        Pt reports feeling of getting mucous/food stuck.   Musculoskeletal: Negative for myalgias.   Allergic/Immunologic: Negative for environmental allergies.       Objective:      Physical Exam   Constitutional: She is oriented to person, place, and time. Vital signs are normal. She appears well-developed and well-nourished. She is cooperative.   HENT:   Head: Normocephalic and atraumatic.   Right Ear: Hearing, tympanic membrane, external ear and ear canal normal.   Left Ear: Hearing, tympanic membrane, external ear and ear canal normal.   Nose: Mucosal edema (left) present. Left sinus exhibits maxillary sinus tenderness and frontal sinus tenderness.   Mouth/Throat: Oropharynx is clear and moist and mucous membranes are normal.   Eyes: Conjunctivae, EOM and lids are normal.   Neck: Normal range of motion. Neck supple.   Cardiovascular: Normal rate, regular rhythm, S1 normal, S2 normal, normal heart sounds and normal pulses.    Pulmonary/Chest: Effort normal and breath sounds normal. No respiratory distress.   Musculoskeletal: Normal range of motion.   Lymphadenopathy:        Head (right side): No submental, no  submandibular, no tonsillar, no preauricular and no posterior auricular adenopathy present.        Head (left side): No submental, no submandibular, no tonsillar, no preauricular and no posterior auricular adenopathy present.   Neurological: She is alert and oriented to person, place, and time.   Skin: Skin is warm, dry and intact. Capillary refill takes less than 2 seconds.   Psychiatric: She has a normal mood and affect. Her speech is normal and behavior is normal. Judgment and thought content normal.   Nursing note and vitals reviewed.      Assessment & Plan:       Bronchitis    Recurrent cough  -     X-Ray Chest PA And Lateral; Future; Expected date: 01/29/2018  -     benzonatate (TESSALON) 200 MG capsule; Take 1 capsule (200 mg total) by mouth 3 (three) times daily as needed.  Dispense: 30 capsule; Refill: 0    Nasal congestion  -     fluticasone (FLONASE) 50 mcg/actuation nasal spray; 1 spray (50 mcg total) by Each Nare route once daily.  Dispense: 15.8 Bottle; Refill: 0    Other orders  -     azithromycin (Z-NELLY) 250 MG tablet; Take 2 tablets by mouth on day 1; Take 1 tablet by mouth on days 2-5  Dispense: 6 tablet; Refill: 0    Will call pt with xray results.       Follow-up if symptoms worsen or fail to improve.

## 2018-02-08 DIAGNOSIS — R42 VERTIGO: ICD-10-CM

## 2018-02-08 RX ORDER — MECLIZINE HCL 12.5 MG 12.5 MG/1
TABLET ORAL
Qty: 30 TABLET | Refills: 1 | Status: SHIPPED | OUTPATIENT
Start: 2018-02-08 | End: 2021-08-13

## 2018-03-11 ENCOUNTER — TELEPHONE (OUTPATIENT)
Dept: FAMILY MEDICINE | Facility: CLINIC | Age: 68
End: 2018-03-11

## 2018-03-11 DIAGNOSIS — I10 HYPERTENSION, UNSPECIFIED TYPE: Primary | ICD-10-CM

## 2018-03-11 DIAGNOSIS — E03.9 HYPOTHYROIDISM, UNSPECIFIED TYPE: ICD-10-CM

## 2018-03-13 ENCOUNTER — TELEPHONE (OUTPATIENT)
Dept: FAMILY MEDICINE | Facility: CLINIC | Age: 68
End: 2018-03-13

## 2018-03-13 DIAGNOSIS — E03.9 HYPOTHYROIDISM, UNSPECIFIED TYPE: ICD-10-CM

## 2018-03-13 RX ORDER — LEVOTHYROXINE SODIUM 112 UG/1
TABLET ORAL
Qty: 30 TABLET | Refills: 0 | Status: SHIPPED | OUTPATIENT
Start: 2018-03-13 | End: 2018-03-13 | Stop reason: SDUPTHER

## 2018-03-13 RX ORDER — LEVOTHYROXINE SODIUM 112 UG/1
TABLET ORAL
Qty: 30 TABLET | Refills: 0 | Status: SHIPPED | OUTPATIENT
Start: 2018-03-13 | End: 2018-03-27 | Stop reason: SDUPTHER

## 2018-03-13 NOTE — TELEPHONE ENCOUNTER
Pt. Needs yearly appt. Will send out #30 levothyroxine to local pharamcy.  Get appt. With Dr. Pantoja.

## 2018-03-22 ENCOUNTER — LAB VISIT (OUTPATIENT)
Dept: LAB | Facility: HOSPITAL | Age: 68
End: 2018-03-22
Attending: INTERNAL MEDICINE
Payer: MEDICARE

## 2018-03-22 DIAGNOSIS — E03.9 HYPOTHYROIDISM, UNSPECIFIED TYPE: ICD-10-CM

## 2018-03-22 LAB — TSH SERPL DL<=0.005 MIU/L-ACNC: 1.66 UIU/ML

## 2018-03-22 PROCEDURE — 36415 COLL VENOUS BLD VENIPUNCTURE: CPT | Mod: PO

## 2018-03-22 PROCEDURE — 84443 ASSAY THYROID STIM HORMONE: CPT

## 2018-03-27 ENCOUNTER — TELEPHONE (OUTPATIENT)
Dept: FAMILY MEDICINE | Facility: CLINIC | Age: 68
End: 2018-03-27

## 2018-03-27 ENCOUNTER — OFFICE VISIT (OUTPATIENT)
Dept: FAMILY MEDICINE | Facility: CLINIC | Age: 68
End: 2018-03-27
Payer: MEDICARE

## 2018-03-27 VITALS
OXYGEN SATURATION: 99 % | HEIGHT: 64 IN | TEMPERATURE: 98 F | BODY MASS INDEX: 39.14 KG/M2 | WEIGHT: 229.25 LBS | RESPIRATION RATE: 18 BRPM | HEART RATE: 77 BPM | DIASTOLIC BLOOD PRESSURE: 70 MMHG | SYSTOLIC BLOOD PRESSURE: 120 MMHG

## 2018-03-27 DIAGNOSIS — E03.9 HYPOTHYROIDISM, UNSPECIFIED TYPE: ICD-10-CM

## 2018-03-27 DIAGNOSIS — E66.01 MORBID OBESITY: ICD-10-CM

## 2018-03-27 DIAGNOSIS — Z78.0 POST-MENOPAUSE: ICD-10-CM

## 2018-03-27 DIAGNOSIS — I10 ESSENTIAL HYPERTENSION: Primary | ICD-10-CM

## 2018-03-27 DIAGNOSIS — I25.10 ATHEROSCLEROSIS OF NATIVE CORONARY ARTERY OF NATIVE HEART WITHOUT ANGINA PECTORIS: ICD-10-CM

## 2018-03-27 PROCEDURE — 99214 OFFICE O/P EST MOD 30 MIN: CPT | Mod: S$PBB,,, | Performed by: FAMILY MEDICINE

## 2018-03-27 PROCEDURE — 99999 PR PBB SHADOW E&M-EST. PATIENT-LVL III: CPT | Mod: PBBFAC,,, | Performed by: FAMILY MEDICINE

## 2018-03-27 PROCEDURE — 99213 OFFICE O/P EST LOW 20 MIN: CPT | Mod: PBBFAC,PO | Performed by: FAMILY MEDICINE

## 2018-03-27 RX ORDER — LEVOTHYROXINE SODIUM 112 UG/1
112 TABLET ORAL
Qty: 90 TABLET | Refills: 3 | Status: SHIPPED | OUTPATIENT
Start: 2018-03-27 | End: 2019-03-25 | Stop reason: SDUPTHER

## 2018-03-27 NOTE — PROGRESS NOTES
"Subjective:       Patient ID: Nancy Figueroa is a 67 y.o. female.    Chief Complaint: Annual Exam    This pt is new to me. Pt is here for followup of chronic medical issues.  She is treated for HTN, hyperlipidemia, CAD and hypothyroidism.  NO acute complaints today.  She is losing weight--is  using [employer].  Her BMI is down to 39.      Review of Systems   Constitutional: Negative for activity change, appetite change, fatigue and unexpected weight change.   Eyes: Negative for visual disturbance.   Respiratory: Negative for cough, chest tightness and shortness of breath.    Cardiovascular: Negative for chest pain, palpitations and leg swelling.   Gastrointestinal: Negative for abdominal pain, constipation, diarrhea, nausea and vomiting.   Endocrine: Negative for cold intolerance, heat intolerance and polyuria.   Genitourinary: Negative for decreased urine volume and dysuria.   Musculoskeletal: Negative for arthralgias and back pain.   Skin: Negative for rash.   Neurological: Negative for numbness and headaches.   Psychiatric/Behavioral: Positive for sleep disturbance (job stress--does not want meds).       Objective:       Vitals:    03/27/18 1048   BP: 120/70   BP Location: Left arm   Patient Position: Sitting   BP Method: Large (Manual)   Pulse: 77   Resp: 18   Temp: 98.2 °F (36.8 °C)   TempSrc: Oral   SpO2: 99%   Weight: 104 kg (229 lb 4.5 oz)   Height: 5' 4" (1.626 m)     Physical Exam   Constitutional: She is oriented to person, place, and time. She appears well-developed and well-nourished.   HENT:   Head: Normocephalic.   Eyes: Conjunctivae and EOM are normal. Pupils are equal, round, and reactive to light.   Neck: Normal range of motion. Neck supple. No thyromegaly present.   Cardiovascular: Normal rate, regular rhythm and normal heart sounds.    Pulmonary/Chest: Effort normal and breath sounds normal.   Abdominal: Soft. Bowel sounds are normal. There is no tenderness.   Musculoskeletal: She exhibits " no edema.   Neurological: She is alert and oriented to person, place, and time.   Skin: Skin is warm and dry.   Psychiatric: She has a normal mood and affect. Her behavior is normal.       Assessment:       1. Essential hypertension    2. Atherosclerosis of native coronary artery of native heart without angina pectoris    3. Hypothyroidism, unspecified type    4. Morbid obesity    5. Post-menopause        Plan:       Nancy was seen today for annual exam.    Diagnoses and all orders for this visit:    Essential hypertension    Atherosclerosis of native coronary artery of native heart without angina pectoris    Hypothyroidism, unspecified type  -     levothyroxine (SYNTHROID) 112 MCG tablet; Take 1 tablet (112 mcg total) by mouth before breakfast.    Morbid obesity    Post-menopause  -     DXA Bone Density Spine And Hip; Future      During this visit, I reviewed the pt's history, medications, allergies, and problem list.    - Continue current therapy  - Serial blood pressure monitoring  - Diet and exercise education.  Melatonin 5 mg po q 9 pm

## 2018-03-27 NOTE — TELEPHONE ENCOUNTER
----- Message from Evangelina Mosqueda sent at 3/27/2018 11:44 AM CDT -----  Contact: [facility] called regarding clarification on Rx, levothyroxine (SYNTHROID) 112 MCG tablet. Please contact [facility] [id number] [facility] [id number] [place] [address] AT [place] [address] [address] [place] [address]  [id number] [place] [id number]  Phone: [phone] Fax: [phone]

## 2018-04-03 ENCOUNTER — HOSPITAL ENCOUNTER (OUTPATIENT)
Dept: RADIOLOGY | Facility: HOSPITAL | Age: 68
Discharge: HOME | End: 2018-04-03
Attending: FAMILY MEDICINE
Payer: MEDICARE

## 2018-04-03 DIAGNOSIS — Z78.0 POST-MENOPAUSE: ICD-10-CM

## 2018-04-03 PROCEDURE — 77080 DXA BONE DENSITY AXIAL: CPT | Mod: 26,,, | Performed by: RADIOLOGY

## 2018-04-03 PROCEDURE — 77080 DXA BONE DENSITY AXIAL: CPT | Mod: TC,PO

## 2018-07-17 ENCOUNTER — PATIENT MESSAGE (OUTPATIENT)
Dept: CARDIOLOGY | Facility: CLINIC | Age: 68
End: 2018-07-17

## 2018-10-03 DIAGNOSIS — Z95.5 STENTED CORONARY ARTERY: Chronic | ICD-10-CM

## 2018-10-03 DIAGNOSIS — I10 ESSENTIAL HYPERTENSION: ICD-10-CM

## 2018-10-04 RX ORDER — ATORVASTATIN CALCIUM 10 MG/1
TABLET, FILM COATED ORAL
Qty: 90 TABLET | Refills: 4 | Status: SHIPPED | OUTPATIENT
Start: 2018-10-04 | End: 2019-08-01 | Stop reason: SDUPTHER

## 2018-10-04 RX ORDER — CLOPIDOGREL BISULFATE 75 MG/1
TABLET ORAL
Qty: 90 TABLET | Refills: 4 | Status: SHIPPED | OUTPATIENT
Start: 2018-10-04 | End: 2019-08-01 | Stop reason: SDUPTHER

## 2018-10-06 ENCOUNTER — HOSPITAL ENCOUNTER (OUTPATIENT)
Dept: RADIOLOGY | Facility: HOSPITAL | Age: 68
Discharge: HOME | End: 2018-10-06
Attending: FAMILY MEDICINE
Payer: MEDICARE

## 2018-10-06 VITALS — BODY MASS INDEX: 39.31 KG/M2 | WEIGHT: 229 LBS

## 2018-10-06 DIAGNOSIS — Z12.39 ENCOUNTER FOR SPECIAL SCREENING EXAMINATION FOR NEOPLASM OF BREAST: ICD-10-CM

## 2018-10-06 PROCEDURE — 77063 BREAST TOMOSYNTHESIS BI: CPT | Mod: TC,PO

## 2018-10-06 PROCEDURE — 77067 SCR MAMMO BI INCL CAD: CPT | Mod: 26,,, | Performed by: RADIOLOGY

## 2018-10-06 PROCEDURE — 77063 BREAST TOMOSYNTHESIS BI: CPT | Mod: 26,,, | Performed by: RADIOLOGY

## 2018-10-24 ENCOUNTER — LAB VISIT (OUTPATIENT)
Dept: LAB | Facility: HOSPITAL | Age: 68
End: 2018-10-24
Attending: INTERNAL MEDICINE
Payer: MEDICARE

## 2018-10-24 ENCOUNTER — CLINICAL SUPPORT (OUTPATIENT)
Dept: CARDIOLOGY | Facility: CLINIC | Age: 68
End: 2018-10-24
Attending: INTERNAL MEDICINE
Payer: MEDICARE

## 2018-10-24 VITALS
HEIGHT: 64 IN | BODY MASS INDEX: 39.09 KG/M2 | SYSTOLIC BLOOD PRESSURE: 130 MMHG | WEIGHT: 229 LBS | DIASTOLIC BLOOD PRESSURE: 80 MMHG

## 2018-10-24 DIAGNOSIS — Z95.5 STENTED CORONARY ARTERY: ICD-10-CM

## 2018-10-24 DIAGNOSIS — Z95.5 STENTED CORONARY ARTERY: Chronic | ICD-10-CM

## 2018-10-24 DIAGNOSIS — I25.10 ATHEROSCLEROSIS OF NATIVE CORONARY ARTERY OF NATIVE HEART WITHOUT ANGINA PECTORIS: ICD-10-CM

## 2018-10-24 LAB
ALBUMIN SERPL BCP-MCNC: 3.5 G/DL
ALP SERPL-CCNC: 95 U/L
ALT SERPL W/O P-5'-P-CCNC: 24 U/L
ANION GAP SERPL CALC-SCNC: 7 MMOL/L
AST SERPL-CCNC: 19 U/L
BILIRUB SERPL-MCNC: 2.1 MG/DL
BUN SERPL-MCNC: 17 MG/DL
CALCIUM SERPL-MCNC: 9.3 MG/DL
CHLORIDE SERPL-SCNC: 108 MMOL/L
CHOLEST SERPL-MCNC: 156 MG/DL
CHOLEST/HDLC SERPL: 2.6 {RATIO}
CO2 SERPL-SCNC: 28 MMOL/L
CREAT SERPL-MCNC: 0.8 MG/DL
EST. GFR  (AFRICAN AMERICAN): >60 ML/MIN/1.73 M^2
EST. GFR  (NON AFRICAN AMERICAN): >60 ML/MIN/1.73 M^2
GLUCOSE SERPL-MCNC: 108 MG/DL
HDLC SERPL-MCNC: 59 MG/DL
HDLC SERPL: 37.8 %
LDLC SERPL CALC-MCNC: 77.2 MG/DL
NONHDLC SERPL-MCNC: 97 MG/DL
POTASSIUM SERPL-SCNC: 4.5 MMOL/L
PROT SERPL-MCNC: 7.1 G/DL
SODIUM SERPL-SCNC: 143 MMOL/L
TRIGL SERPL-MCNC: 99 MG/DL

## 2018-10-24 PROCEDURE — 99999 PR PBB SHADOW E&M-EST. PATIENT-LVL I: CPT | Mod: PBBFAC,,,

## 2018-10-24 PROCEDURE — 80061 LIPID PANEL: CPT

## 2018-10-24 PROCEDURE — 93306 TTE W/DOPPLER COMPLETE: CPT | Mod: PBBFAC,PO | Performed by: INTERNAL MEDICINE

## 2018-10-24 PROCEDURE — 36415 COLL VENOUS BLD VENIPUNCTURE: CPT | Mod: PO

## 2018-10-24 PROCEDURE — 80053 COMPREHEN METABOLIC PANEL: CPT

## 2018-10-24 PROCEDURE — 99211 OFF/OP EST MAY X REQ PHY/QHP: CPT | Mod: PBBFAC,PO,25

## 2018-10-25 LAB
ASCENDING AORTA: 3.05 CM
AV MEAN GRADIENT: 5.77 MMHG
AV PEAK GRADIENT: 9.07 MMHG
AV VALVE AREA: 1.76 CM2
AV VELOCITY RATIO: 0.6
BSA FOR ECHO PROCEDURE: 2.17 M2
CV ECHO LV RWT: 0.4 CM
DOP CALC AO PEAK VEL: 1.51 M/S
DOP CALC AO VTI: 37.98 CM
DOP CALC LVOT AREA: 3.14 CM2
DOP CALC LVOT DIAMETER: 2 CM
DOP CALC LVOT PEAK VEL: 0.9 M/S
DOP CALC LVOT STROKE VOLUME: 66.79 CM3
DOP CALCLVOT PEAK VEL VTI: 21.27 CM
E WAVE DECELERATION TIME: 221.62 MSEC
E/A RATIO: 1.41
E/E' RATIO: 12.38
ECHO LV POSTERIOR WALL: 0.97 CM (ref 0.6–1.1)
FRACTIONAL SHORTENING: 34 % (ref 28–44)
INTERVENTRICULAR SEPTUM: 0.83 CM (ref 0.6–1.1)
IVRT: 0.07 MSEC
LA MAJOR: 4.98 CM
LA MINOR: 5.45 CM
LA WIDTH: 4.2 CM
LEFT ATRIUM SIZE: 4.12 CM
LEFT ATRIUM VOLUME INDEX: 35.3 ML/M2
LEFT ATRIUM VOLUME: 76.55 CM3
LEFT INTERNAL DIMENSION IN SYSTOLE: 2.99 CM (ref 2.1–4)
LEFT VENTRICLE DIASTOLIC VOLUME INDEX: 42.89 ML/M2
LEFT VENTRICLE DIASTOLIC VOLUME: 93.07 ML
LEFT VENTRICLE MASS INDEX: 61.4 G/M2
LEFT VENTRICLE SYSTOLIC VOLUME INDEX: 16 ML/M2
LEFT VENTRICLE SYSTOLIC VOLUME: 34.66 ML
LEFT VENTRICULAR INTERNAL DIMENSION IN DIASTOLE: 4.51 CM (ref 3.5–6)
LEFT VENTRICULAR MASS: 133.31 G
LV LATERAL E/E' RATIO: 12.38
LV SEPTAL E/E' RATIO: 12.38
MV PEAK A VEL: 0.7 M/S
MV PEAK E VEL: 0.99 M/S
PISA TR MAX VEL: 2.5 M/S
PULM VEIN S/D RATIO: 0.99
PV PEAK D VEL: 0.7 M/S
PV PEAK S VEL: 0.69 M/S
RA MAJOR: 4.7 CM
RA PRESSURE: 3 MMHG
RA WIDTH: 3.71 CM
RETIRED EF AND QEF - SEE NOTES: 62.76 %
RIGHT VENTRICULAR END-DIASTOLIC DIMENSION: 4.13 CM
SINUS: 2.58 CM
STJ: 2.59 CM
TDI LATERAL: 0.08
TDI SEPTAL: 0.08
TDI: 0.08
TR MAX PG: 25 MMHG
TRICUSPID ANNULAR PLANE SYSTOLIC EXCURSION: 1.84 CM
TV REST PULMONARY ARTERY PRESSURE: 28 MMHG

## 2018-11-01 ENCOUNTER — OFFICE VISIT (OUTPATIENT)
Dept: CARDIOLOGY | Facility: CLINIC | Age: 68
End: 2018-11-01
Payer: MEDICARE

## 2018-11-01 VITALS
WEIGHT: 252.19 LBS | DIASTOLIC BLOOD PRESSURE: 78 MMHG | HEART RATE: 73 BPM | SYSTOLIC BLOOD PRESSURE: 143 MMHG | BODY MASS INDEX: 43.05 KG/M2 | HEIGHT: 64 IN

## 2018-11-01 DIAGNOSIS — I10 ESSENTIAL HYPERTENSION: ICD-10-CM

## 2018-11-01 DIAGNOSIS — I25.10 ATHEROSCLEROSIS OF NATIVE CORONARY ARTERY OF NATIVE HEART WITHOUT ANGINA PECTORIS: ICD-10-CM

## 2018-11-01 DIAGNOSIS — Z95.5 STENTED CORONARY ARTERY: Primary | Chronic | ICD-10-CM

## 2018-11-01 PROCEDURE — 99214 OFFICE O/P EST MOD 30 MIN: CPT | Mod: S$PBB,,, | Performed by: INTERNAL MEDICINE

## 2018-11-01 PROCEDURE — 99999 PR PBB SHADOW E&M-EST. PATIENT-LVL III: CPT | Mod: PBBFAC,,, | Performed by: INTERNAL MEDICINE

## 2018-11-01 PROCEDURE — 99213 OFFICE O/P EST LOW 20 MIN: CPT | Mod: PBBFAC,PO | Performed by: INTERNAL MEDICINE

## 2018-11-01 NOTE — PROGRESS NOTES
Subjective:    Patient ID:  Nancy Figueroa is a 68 y.o. female who presents for follow-up of No chief complaint on file.      HPI  Here for follow up of CAD-PCI (4/17 BRYNN) after small NSTEMI. No CP. Mild LEON.         Review of Systems   Constitution: Negative for malaise/fatigue.   Eyes: Negative for blurred vision.   Cardiovascular: Negative for chest pain, claudication, cyanosis, dyspnea on exertion, irregular heartbeat, leg swelling, near-syncope, orthopnea, palpitations, paroxysmal nocturnal dyspnea and syncope.   Respiratory: Negative for cough and shortness of breath.    Hematologic/Lymphatic: Does not bruise/bleed easily.   Musculoskeletal: Negative for back pain, falls, joint pain, muscle cramps, muscle weakness and myalgias.   Gastrointestinal: Negative for abdominal pain, change in bowel habit, nausea and vomiting.   Genitourinary: Negative for urgency.   Neurological: Negative for dizziness, focal weakness and light-headedness.        Objective:    Physical Exam   Constitutional: She is oriented to person, place, and time. She appears well-developed and well-nourished.   HENT:   Head: Normocephalic.   Eyes: Conjunctivae are normal.   Neck: Normal range of motion. Neck supple. No JVD present.   Cardiovascular: Normal rate, regular rhythm, normal heart sounds and intact distal pulses.   Pulses:       Carotid pulses are 2+ on the right side, and 2+ on the left side.       Radial pulses are 2+ on the right side, and 2+ on the left side.        Dorsalis pedis pulses are 2+ on the right side, and 2+ on the left side.        Posterior tibial pulses are 2+ on the right side, and 2+ on the left side.   Pulmonary/Chest: Effort normal and breath sounds normal.   Abdominal: Soft. Bowel sounds are normal.   Musculoskeletal: She exhibits no edema or tenderness.   Neurological: She is alert and oriented to person, place, and time. Gait normal.   Skin: Skin is warm, dry and intact. No cyanosis. Nails show no clubbing.    Psychiatric: She has a normal mood and affect. Her speech is normal and behavior is normal. Thought content normal.   Nursing note and vitals reviewed.              ..    Chemistry        Component Value Date/Time     10/24/2018 0819    K 4.5 10/24/2018 0819     10/24/2018 0819    CO2 28 10/24/2018 0819    BUN 17 10/24/2018 0819    CREATININE 0.8 10/24/2018 0819     10/24/2018 0819        Component Value Date/Time    CALCIUM 9.3 10/24/2018 0819    ALKPHOS 95 10/24/2018 0819    AST 19 10/24/2018 0819    ALT 24 10/24/2018 0819    BILITOT 2.1 (H) 10/24/2018 0819    ESTGFRAFRICA >60.0 10/24/2018 0819    EGFRNONAA >60.0 10/24/2018 0819            ..  Lab Results   Component Value Date    CHOL 156 10/24/2018    CHOL 151 10/23/2017    CHOL 193 04/25/2017     Lab Results   Component Value Date    HDL 59 10/24/2018    HDL 50 10/23/2017    HDL 52 04/25/2017     Lab Results   Component Value Date    LDLCALC 77.2 10/24/2018    LDLCALC 80.4 10/23/2017    LDLCALC 116.8 04/25/2017     Lab Results   Component Value Date    TRIG 99 10/24/2018    TRIG 103 10/23/2017    TRIG 121 04/25/2017     Lab Results   Component Value Date    CHOLHDL 37.8 10/24/2018    CHOLHDL 33.1 10/23/2017    CHOLHDL 26.9 04/25/2017     ..  Lab Results   Component Value Date    WBC 6.05 10/23/2017    HGB 13.0 10/23/2017    HCT 39.6 10/23/2017    MCV 86 10/23/2017     10/23/2017       Test(s) Reviewed  I have reviewed the following in detail:  [] Stress test   [] Angiography   [x] Echocardiogram   [x] Labs   [] Other:       Assessment:         ICD-10-CM ICD-9-CM   1. Stented coronary artery Z95.5 V45.82   2. Atherosclerosis of native coronary artery of native heart without angina pectoris I25.10 414.01   3. Essential hypertension I10 401.9     Problem List Items Addressed This Visit     Stented coronary artery - Primary (Chronic)    Overview     4/17 LAD-Synergy 3.0 x 28          Essential hypertension    Atherosclerotic heart  disease of native coronary artery without angina pectoris    Overview     4/17   Left main: normal   LAD: two diagonals  Less than 50%.  90% mid LAD  Circumflex:  minimal disease, less than 20%.  --  RCA: The vessel was large sized (dominant) with less than 20% stenosis.                Plan:           Return to clinic 8 months   Aerobic exercise 5x's/wk. Heart healthy diet and risk factor modification.    See labs and med orders.  TM nuc call results   refer for wt loss surg eval

## 2018-11-13 ENCOUNTER — HOSPITAL ENCOUNTER (OUTPATIENT)
Dept: RADIOLOGY | Facility: HOSPITAL | Age: 68
Discharge: HOME | End: 2018-11-13
Attending: INTERNAL MEDICINE
Payer: MEDICARE

## 2018-11-13 DIAGNOSIS — I10 ESSENTIAL HYPERTENSION: ICD-10-CM

## 2018-11-13 DIAGNOSIS — Z95.5 STENTED CORONARY ARTERY: ICD-10-CM

## 2018-11-13 PROCEDURE — 78452 HT MUSCLE IMAGE SPECT MULT: CPT | Mod: PO

## 2018-11-13 PROCEDURE — 78452 HT MUSCLE IMAGE SPECT MULT: CPT | Mod: 26,,, | Performed by: INTERNAL MEDICINE

## 2018-11-13 PROCEDURE — 93016 CV STRESS TEST SUPVJ ONLY: CPT | Mod: ,,, | Performed by: INTERNAL MEDICINE

## 2018-11-13 PROCEDURE — 93018 CV STRESS TEST I&R ONLY: CPT | Mod: ,,, | Performed by: INTERNAL MEDICINE

## 2018-11-16 LAB
CV STRESS BASE HR: 74
DIASTOLIC BLOOD PRESSURE: 105
NUC REST DIASTOLIC VOLUME INDEX: 63
NUC REST EJECTION FRACTION: 65
NUC REST SYSTOLIC VOLUME INDEX: 10
OHS CV CPX 1 MINUTE RECOVERY HEART RATE: 100 BPM
OHS CV CPX 85 PERCENT MAX PREDICTED HEART RATE MALE: 126
OHS CV CPX ESTIMATED METS: 8
OHS CV CPX MAX PREDICTED HEART RATE: 148
OHS CV CPX PATIENT IS FEMALE: 1
OHS CV CPX PATIENT IS MALE: 0
OHS CV CPX PEAK DIASTOLIC BLOOD PRESSURE: 92 MMHG
OHS CV CPX PEAK HEAR RATE: 127
OHS CV CPX PEAK RATE PRESSURE PRODUCT: NORMAL
OHS CV CPX PEAK SYSTOLIC BLOOD PRESSURE: 167
OHS CV CPX PERCENT MAX PREDICTED HEART RATE ACHIEVED: 86
OHS CV CPX RATE PRESSURE PRODUCT PRESENTING: NORMAL
STRESS ECHO POST EXERCISE DUR MIN: 5 MIN
STRESS ECHO POST EXERCISE DUR SEC: 9
SYSTOLIC BLOOD PRESSURE: 144

## 2018-11-17 ENCOUNTER — PATIENT MESSAGE (OUTPATIENT)
Dept: CARDIOLOGY | Facility: CLINIC | Age: 68
End: 2018-11-17

## 2018-11-25 DIAGNOSIS — I10 ESSENTIAL HYPERTENSION: ICD-10-CM

## 2018-11-26 RX ORDER — VALSARTAN AND HYDROCHLOROTHIAZIDE 320; 25 MG/1; MG/1
1 TABLET, FILM COATED ORAL EVERY MORNING
Qty: 90 TABLET | Refills: 4 | Status: ON HOLD | OUTPATIENT
Start: 2018-11-26 | End: 2020-01-28 | Stop reason: HOSPADM

## 2018-11-26 RX ORDER — CARVEDILOL 6.25 MG/1
TABLET ORAL
Qty: 180 TABLET | Refills: 4 | Status: SHIPPED | OUTPATIENT
Start: 2018-11-26 | End: 2019-08-01 | Stop reason: SDUPTHER

## 2018-11-26 RX ORDER — AMLODIPINE BESYLATE 2.5 MG/1
TABLET ORAL
Qty: 90 TABLET | Refills: 4 | Status: SHIPPED | OUTPATIENT
Start: 2018-11-26 | End: 2019-08-01 | Stop reason: SDUPTHER

## 2018-11-27 ENCOUNTER — OFFICE VISIT (OUTPATIENT)
Dept: OPTOMETRY | Facility: CLINIC | Age: 68
End: 2018-11-27
Payer: MEDICARE

## 2018-11-27 DIAGNOSIS — Z46.0 CONTACT LENS/GLASSES FITTING: ICD-10-CM

## 2018-11-27 DIAGNOSIS — H25.13 NUCLEAR SCLEROSIS, BILATERAL: Primary | ICD-10-CM

## 2018-11-27 DIAGNOSIS — Z46.0 CONTACT LENS/GLASSES FITTING: Primary | ICD-10-CM

## 2018-11-27 DIAGNOSIS — H40.039 ANATOMICAL NARROW ANGLE: ICD-10-CM

## 2018-11-27 DIAGNOSIS — H52.203 ASTIGMATISM WITH PRESBYOPIA, BILATERAL: ICD-10-CM

## 2018-11-27 DIAGNOSIS — H52.4 ASTIGMATISM WITH PRESBYOPIA, BILATERAL: ICD-10-CM

## 2018-11-27 DIAGNOSIS — H43.393 VITREOUS FLOATERS, BILATERAL: ICD-10-CM

## 2018-11-27 DIAGNOSIS — Z13.5 GLAUCOMA SCREENING: ICD-10-CM

## 2018-11-27 PROCEDURE — 92014 COMPRE OPH EXAM EST PT 1/>: CPT | Mod: S$PBB,,, | Performed by: OPTOMETRIST

## 2018-11-27 PROCEDURE — 99499 UNLISTED E&M SERVICE: CPT | Mod: S$PBB,,, | Performed by: OPTOMETRIST

## 2018-11-27 PROCEDURE — 92015 DETERMINE REFRACTIVE STATE: CPT | Mod: ,,, | Performed by: OPTOMETRIST

## 2018-11-27 PROCEDURE — 99999 PR PBB SHADOW E&M-EST. PATIENT-LVL III: CPT | Mod: PBBFAC,,, | Performed by: OPTOMETRIST

## 2018-11-27 PROCEDURE — 99213 OFFICE O/P EST LOW 20 MIN: CPT | Mod: PBBFAC,PO | Performed by: OPTOMETRIST

## 2018-11-27 PROCEDURE — 92310 CONTACT LENS FITTING OU: CPT | Mod: ,,, | Performed by: OPTOMETRIST

## 2018-11-27 NOTE — PROGRESS NOTES
Assessment /Plan     For exam results, see Encounter Report.    Contact lens/glasses fitting      Fitting fees only ---see exam notes this date

## 2018-11-27 NOTE — PATIENT INSTRUCTIONS
"EXTENDED WEAR CONTACT LENSES  Continue with Extended Wear of contact lenses, up to maximum nights discussed--6 nights.  Continue with approved contact lens disinfection / rewetting drops as discussed.  Dispose of lenses every 4 weeks as discussed.  Extended wear of contact lenses increases your risk of corneal abrasion, infection, and ulceration.  Stop wearing your lenses and call our office if redness, blurred vision, or pain persists more than 12 hours.  We recommend an annual eye exam for contact lens patients.    FLASHES / FLOATERS / POSTERIOR VITREOUS DETACHMENT    Call the clinic if you have any further changes in symptoms.  Including:  Increased numbers of floaters or flashing lights, dimness or darkness that moves through or stays constant in your vision, or any pain in the eye (s).            Early Cataracts--not visually significant for surgery consultation.    What Are Cataracts?  A clear lens in the eye focuses light. This lets the eye see images sharply. With age, the lens slowly becomes cloudy. The cloudy lens is a cataract. A cataract scatters light and makes it hard for the eye to focus. Cataracts often form in both eyes. But one lens may cloud faster than the other.      The Aging of Your Lens    Your lens may cloud so slowly that you don`t notice any vision changes at first. But as the cataract gets worse, the eye has a harder time focusing. In early stages, glasses may help you see better. As the lens gets cloudier, your doctor may recommend surgery to restore your vision.  DRY EYES:  Use Over The Counter artificial tears as needed for dry eye symptoms.  Some common brands include:  Systane, Optive, and Refresh.  These drops can be used as frequently as desired, but may be most helpful use during long periods of concentrated work.  For example, reading / working at the computer.  Avoid drops that "get redness out", as these contain medication that may further irritate the eyes.    ALLERGY EYES / " SYMPTOMS:    Over the counter medications include--Zaditor and Alaway  Use as directed 1-2 drops daily for symptoms of itching / watering eyes.  These drops will not help for dry eye or exposure symptoms.

## 2019-03-25 DIAGNOSIS — E03.9 HYPOTHYROIDISM, UNSPECIFIED TYPE: ICD-10-CM

## 2019-03-26 DIAGNOSIS — E03.9 HYPOTHYROIDISM, UNSPECIFIED TYPE: ICD-10-CM

## 2019-03-26 RX ORDER — LEVOTHYROXINE SODIUM 112 UG/1
TABLET ORAL
Qty: 30 TABLET | Refills: 0 | Status: SHIPPED | OUTPATIENT
Start: 2019-03-26 | End: 2020-04-29 | Stop reason: SDUPTHER

## 2019-03-26 RX ORDER — LEVOTHYROXINE SODIUM 112 UG/1
TABLET ORAL
Qty: 30 TABLET | Refills: 0 | Status: SHIPPED | OUTPATIENT
Start: 2019-03-26 | End: 2019-06-17 | Stop reason: SDUPTHER

## 2019-05-18 DIAGNOSIS — E03.9 HYPOTHYROIDISM, UNSPECIFIED TYPE: ICD-10-CM

## 2019-05-20 RX ORDER — LEVOTHYROXINE SODIUM 112 UG/1
TABLET ORAL
Qty: 30 TABLET | Refills: 0 | OUTPATIENT
Start: 2019-05-20

## 2019-06-17 DIAGNOSIS — E03.9 HYPOTHYROIDISM, UNSPECIFIED TYPE: ICD-10-CM

## 2019-06-17 RX ORDER — LEVOTHYROXINE SODIUM 112 UG/1
TABLET ORAL
Qty: 30 TABLET | Refills: 0 | Status: SHIPPED | OUTPATIENT
Start: 2019-06-17 | End: 2019-07-17 | Stop reason: SDUPTHER

## 2019-07-03 ENCOUNTER — TELEPHONE (OUTPATIENT)
Dept: FAMILY MEDICINE | Facility: CLINIC | Age: 69
End: 2019-07-03

## 2019-07-03 DIAGNOSIS — E03.9 ACQUIRED HYPOTHYROIDISM: Primary | ICD-10-CM

## 2019-07-03 DIAGNOSIS — I10 ESSENTIAL HYPERTENSION: ICD-10-CM

## 2019-07-17 DIAGNOSIS — E03.9 HYPOTHYROIDISM, UNSPECIFIED TYPE: ICD-10-CM

## 2019-07-17 RX ORDER — LEVOTHYROXINE SODIUM 112 UG/1
TABLET ORAL
Qty: 30 TABLET | Refills: 0 | Status: SHIPPED | OUTPATIENT
Start: 2019-07-17 | End: 2019-08-16 | Stop reason: SDUPTHER

## 2019-08-01 ENCOUNTER — OFFICE VISIT (OUTPATIENT)
Dept: CARDIOLOGY | Facility: CLINIC | Age: 69
End: 2019-08-01
Payer: MEDICARE

## 2019-08-01 VITALS
HEART RATE: 76 BPM | WEIGHT: 252 LBS | HEIGHT: 64 IN | BODY MASS INDEX: 43.02 KG/M2 | SYSTOLIC BLOOD PRESSURE: 133 MMHG | DIASTOLIC BLOOD PRESSURE: 87 MMHG

## 2019-08-01 DIAGNOSIS — Z95.5 STENTED CORONARY ARTERY: Primary | Chronic | ICD-10-CM

## 2019-08-01 DIAGNOSIS — I25.10 ATHEROSCLEROSIS OF NATIVE CORONARY ARTERY OF NATIVE HEART WITHOUT ANGINA PECTORIS: ICD-10-CM

## 2019-08-01 DIAGNOSIS — I10 ESSENTIAL HYPERTENSION: ICD-10-CM

## 2019-08-01 DIAGNOSIS — I21.4 NSTEMI (NON-ST ELEVATED MYOCARDIAL INFARCTION): ICD-10-CM

## 2019-08-01 DIAGNOSIS — E78.5 DYSLIPIDEMIA: ICD-10-CM

## 2019-08-01 PROCEDURE — 99999 PR PBB SHADOW E&M-EST. PATIENT-LVL III: ICD-10-PCS | Mod: PBBFAC,,, | Performed by: INTERNAL MEDICINE

## 2019-08-01 PROCEDURE — 99214 PR OFFICE/OUTPT VISIT, EST, LEVL IV, 30-39 MIN: ICD-10-PCS | Mod: S$PBB,,, | Performed by: INTERNAL MEDICINE

## 2019-08-01 PROCEDURE — 99214 OFFICE O/P EST MOD 30 MIN: CPT | Mod: S$PBB,,, | Performed by: INTERNAL MEDICINE

## 2019-08-01 PROCEDURE — 99999 PR PBB SHADOW E&M-EST. PATIENT-LVL III: CPT | Mod: PBBFAC,,, | Performed by: INTERNAL MEDICINE

## 2019-08-01 PROCEDURE — 99213 OFFICE O/P EST LOW 20 MIN: CPT | Mod: PBBFAC,PO | Performed by: INTERNAL MEDICINE

## 2019-08-01 RX ORDER — NITROGLYCERIN 0.4 MG/1
0.4 TABLET SUBLINGUAL EVERY 5 MIN PRN
Qty: 30 TABLET | Refills: 12 | Status: SHIPPED | OUTPATIENT
Start: 2019-08-01 | End: 2020-03-18

## 2019-08-01 RX ORDER — CLOPIDOGREL BISULFATE 75 MG/1
75 TABLET ORAL DAILY
Qty: 90 TABLET | Refills: 4 | Status: SHIPPED | OUTPATIENT
Start: 2019-08-01 | End: 2020-10-21

## 2019-08-01 RX ORDER — AMLODIPINE BESYLATE 2.5 MG/1
TABLET ORAL
Qty: 90 TABLET | Refills: 4 | Status: SHIPPED | OUTPATIENT
Start: 2019-08-01 | End: 2020-08-11 | Stop reason: SDUPTHER

## 2019-08-01 RX ORDER — CARVEDILOL 6.25 MG/1
6.25 TABLET ORAL 2 TIMES DAILY
Qty: 180 TABLET | Refills: 4 | Status: SHIPPED | OUTPATIENT
Start: 2019-08-01 | End: 2020-08-11 | Stop reason: SDUPTHER

## 2019-08-01 RX ORDER — ATORVASTATIN CALCIUM 10 MG/1
10 TABLET, FILM COATED ORAL DAILY
Qty: 90 TABLET | Refills: 4 | Status: SHIPPED | OUTPATIENT
Start: 2019-08-01 | End: 2020-08-11 | Stop reason: SDUPTHER

## 2019-08-01 NOTE — PROGRESS NOTES
Subjective:    Patient ID:  Nancy Figueroa is a 69 y.o. female who presents for follow-up of No chief complaint on file.      HPI  Here for follow up of CAD-PCI (4/17 BRYNN) after small NSTEMI.    Just returned from [place] walked all over w/o angina.        Review of Systems   Constitution: Negative for malaise/fatigue.   Eyes: Negative for blurred vision.   Cardiovascular: Negative for chest pain, claudication, cyanosis, dyspnea on exertion, irregular heartbeat, leg swelling, near-syncope, orthopnea, palpitations, paroxysmal nocturnal dyspnea and syncope.   Respiratory: Negative for cough and shortness of breath.    Hematologic/Lymphatic: Does not bruise/bleed easily.   Musculoskeletal: Negative for back pain, falls, joint pain, muscle cramps, muscle weakness and myalgias.   Gastrointestinal: Negative for abdominal pain, change in bowel habit, nausea and vomiting.   Genitourinary: Negative for urgency.   Neurological: Negative for dizziness, focal weakness and light-headedness.       Past Medical History:   Diagnosis Date    Atherosclerotic heart disease of native coronary artery without angina pectoris 5/1/2017 4/17  Left main: normal  LAD: two diagonals  Less than 50%.  90% mid LAD Circumflex:  minimal disease, less than 20%. --  RCA: The vessel was large sized (dominant) with less than 20% stenosis.    Cataract     OU    Essential hypertension 10/18/2012    GERD (gastroesophageal reflux disease)     HTN (hypertension)     Hypothyroidism     NSTEMI (non-ST elevated myocardial infarction) 4/24/2017 4/2017    Stented coronary artery 7/26/2017 4/17 LAD-Synergy 3.0 x 28           Objective:     Vitals:    08/01/19 0951   BP: 133/87   Pulse: 76        Physical Exam   Constitutional: She is oriented to person, place, and time. She appears well-developed and well-nourished.   HENT:   Head: Normocephalic.   Eyes: Conjunctivae are normal.   Neck: Normal range of motion. Neck supple. No JVD present.    Cardiovascular: Normal rate, regular rhythm, normal heart sounds and intact distal pulses.   Pulses:       Carotid pulses are 2+ on the right side, and 2+ on the left side.       Radial pulses are 2+ on the right side, and 2+ on the left side.        Dorsalis pedis pulses are 2+ on the right side, and 2+ on the left side.        Posterior tibial pulses are 2+ on the right side, and 2+ on the left side.   Pulmonary/Chest: Effort normal and breath sounds normal.   Abdominal: Soft. Bowel sounds are normal.   Musculoskeletal: She exhibits no edema or tenderness.   Neurological: She is alert and oriented to person, place, and time. Gait normal.   Skin: Skin is warm, dry and intact. No cyanosis. Nails show no clubbing.   Psychiatric: She has a normal mood and affect. Her speech is normal and behavior is normal. Thought content normal.   Nursing note and vitals reviewed.            ..    Chemistry        Component Value Date/Time     10/24/2018 0819    K 4.5 10/24/2018 0819     10/24/2018 0819    CO2 28 10/24/2018 0819    BUN 17 10/24/2018 0819    CREATININE 0.8 10/24/2018 0819     10/24/2018 0819        Component Value Date/Time    CALCIUM 9.3 10/24/2018 0819    ALKPHOS 95 10/24/2018 0819    AST 19 10/24/2018 0819    ALT 24 10/24/2018 0819    BILITOT 2.1 (H) 10/24/2018 0819    ESTGFRAFRICA >60.0 10/24/2018 0819    EGFRNONAA >60.0 10/24/2018 0819            ..  Lab Results   Component Value Date    CHOL 156 10/24/2018    CHOL 151 10/23/2017    CHOL 193 04/25/2017     Lab Results   Component Value Date    HDL 59 10/24/2018    HDL 50 10/23/2017    HDL 52 04/25/2017     Lab Results   Component Value Date    LDLCALC 77.2 10/24/2018    LDLCALC 80.4 10/23/2017    LDLCALC 116.8 04/25/2017     Lab Results   Component Value Date    TRIG 99 10/24/2018    TRIG 103 10/23/2017    TRIG 121 04/25/2017     Lab Results   Component Value Date    CHOLHDL 37.8 10/24/2018    CHOLHDL 33.1 10/23/2017    CHOLHDL 26.9  04/25/2017     ..  Lab Results   Component Value Date    WBC 6.05 10/23/2017    HGB 13.0 10/23/2017    HCT 39.6 10/23/2017    MCV 86 10/23/2017     10/23/2017       Test(s) Reviewed  I have reviewed the following in detail:  [] Stress test   [] Angiography   [] Echocardiogram   [x] Labs   [x] Other:       Assessment:         ICD-10-CM ICD-9-CM   1. Stented coronary artery Z95.5 V45.82   2. Essential hypertension I10 401.9   3. Atherosclerosis of native coronary artery of native heart without angina pectoris I25.10 414.01   4. NSTEMI (non-ST elevated myocardial infarction) I21.4 410.70   5. Dyslipidemia E78.5 272.4     Problem List Items Addressed This Visit     Stented coronary artery - Primary (Chronic)    Overview     4/17 LAD-Synergy 3.0 x 28          NSTEMI (non-ST elevated myocardial infarction)    Overview     4/2017         Essential hypertension    Dyslipidemia    Atherosclerotic heart disease of native coronary artery without angina pectoris    Overview     4/17   Left main: normal   LAD: two diagonals  Less than 50%.  90% mid LAD  Circumflex:  minimal disease, less than 20%.  --  RCA: The vessel was large sized (dominant) with less than 20% stenosis.                Plan:           Return to clinic 12 months   Low level/low impact aerobic exercise 5x's/wk. Heart healthy diet and risk factor modification.    See labs and med orders.  Check lipid/cmp with upcoming labs  Left knee xray after fall f/u pcp    Portions of this note may have been created with voice recognition software.  Grammatical, syntax and spelling errors may be inevitable.

## 2019-08-16 DIAGNOSIS — E03.9 HYPOTHYROIDISM, UNSPECIFIED TYPE: ICD-10-CM

## 2019-08-16 RX ORDER — LEVOTHYROXINE SODIUM 112 UG/1
TABLET ORAL
Qty: 30 TABLET | Refills: 0 | Status: SHIPPED | OUTPATIENT
Start: 2019-08-16 | End: 2019-09-15 | Stop reason: SDUPTHER

## 2019-09-03 ENCOUNTER — PATIENT OUTREACH (OUTPATIENT)
Dept: ADMINISTRATIVE | Facility: HOSPITAL | Age: 69
End: 2019-09-03

## 2019-09-10 ENCOUNTER — LAB VISIT (OUTPATIENT)
Dept: LAB | Facility: HOSPITAL | Age: 69
End: 2019-09-10
Attending: FAMILY MEDICINE
Payer: MEDICARE

## 2019-09-10 DIAGNOSIS — I10 ESSENTIAL HYPERTENSION: ICD-10-CM

## 2019-09-10 DIAGNOSIS — E03.9 ACQUIRED HYPOTHYROIDISM: ICD-10-CM

## 2019-09-10 DIAGNOSIS — E78.5 DYSLIPIDEMIA: ICD-10-CM

## 2019-09-10 DIAGNOSIS — Z95.5 STENTED CORONARY ARTERY: Chronic | ICD-10-CM

## 2019-09-10 LAB
ALBUMIN SERPL BCP-MCNC: 3.8 G/DL (ref 3.5–5.2)
ALBUMIN SERPL BCP-MCNC: 3.8 G/DL (ref 3.5–5.2)
ALP SERPL-CCNC: 113 U/L (ref 55–135)
ALP SERPL-CCNC: 113 U/L (ref 55–135)
ALT SERPL W/O P-5'-P-CCNC: 19 U/L (ref 10–44)
ALT SERPL W/O P-5'-P-CCNC: 19 U/L (ref 10–44)
ANION GAP SERPL CALC-SCNC: 7 MMOL/L (ref 8–16)
ANION GAP SERPL CALC-SCNC: 7 MMOL/L (ref 8–16)
AST SERPL-CCNC: 18 U/L (ref 10–40)
AST SERPL-CCNC: 18 U/L (ref 10–40)
BILIRUB SERPL-MCNC: 1.7 MG/DL (ref 0.1–1)
BILIRUB SERPL-MCNC: 1.7 MG/DL (ref 0.1–1)
BUN SERPL-MCNC: 16 MG/DL (ref 8–23)
BUN SERPL-MCNC: 16 MG/DL (ref 8–23)
CALCIUM SERPL-MCNC: 9.4 MG/DL (ref 8.7–10.5)
CALCIUM SERPL-MCNC: 9.4 MG/DL (ref 8.7–10.5)
CHLORIDE SERPL-SCNC: 106 MMOL/L (ref 95–110)
CHLORIDE SERPL-SCNC: 106 MMOL/L (ref 95–110)
CHOLEST SERPL-MCNC: 159 MG/DL (ref 120–199)
CHOLEST SERPL-MCNC: 159 MG/DL (ref 120–199)
CHOLEST/HDLC SERPL: 3.2 {RATIO} (ref 2–5)
CHOLEST/HDLC SERPL: 3.2 {RATIO} (ref 2–5)
CO2 SERPL-SCNC: 27 MMOL/L (ref 23–29)
CO2 SERPL-SCNC: 27 MMOL/L (ref 23–29)
CREAT SERPL-MCNC: 0.8 MG/DL (ref 0.5–1.4)
CREAT SERPL-MCNC: 0.8 MG/DL (ref 0.5–1.4)
EST. GFR  (AFRICAN AMERICAN): >60 ML/MIN/1.73 M^2
EST. GFR  (AFRICAN AMERICAN): >60 ML/MIN/1.73 M^2
EST. GFR  (NON AFRICAN AMERICAN): >60 ML/MIN/1.73 M^2
EST. GFR  (NON AFRICAN AMERICAN): >60 ML/MIN/1.73 M^2
GLUCOSE SERPL-MCNC: 114 MG/DL (ref 70–110)
GLUCOSE SERPL-MCNC: 114 MG/DL (ref 70–110)
HDLC SERPL-MCNC: 49 MG/DL (ref 40–75)
HDLC SERPL-MCNC: 49 MG/DL (ref 40–75)
HDLC SERPL: 30.8 % (ref 20–50)
HDLC SERPL: 30.8 % (ref 20–50)
LDLC SERPL CALC-MCNC: 80 MG/DL (ref 63–159)
LDLC SERPL CALC-MCNC: 80 MG/DL (ref 63–159)
NONHDLC SERPL-MCNC: 110 MG/DL
NONHDLC SERPL-MCNC: 110 MG/DL
POTASSIUM SERPL-SCNC: 3.9 MMOL/L (ref 3.5–5.1)
POTASSIUM SERPL-SCNC: 3.9 MMOL/L (ref 3.5–5.1)
PROT SERPL-MCNC: 7.5 G/DL (ref 6–8.4)
PROT SERPL-MCNC: 7.5 G/DL (ref 6–8.4)
SODIUM SERPL-SCNC: 140 MMOL/L (ref 136–145)
SODIUM SERPL-SCNC: 140 MMOL/L (ref 136–145)
T4 FREE SERPL-MCNC: 1.34 NG/DL (ref 0.71–1.51)
TRIGL SERPL-MCNC: 150 MG/DL (ref 30–150)
TRIGL SERPL-MCNC: 150 MG/DL (ref 30–150)
TSH SERPL DL<=0.005 MIU/L-ACNC: 1.17 UIU/ML (ref 0.4–4)
TSH SERPL DL<=0.005 MIU/L-ACNC: 1.17 UIU/ML (ref 0.4–4)

## 2019-09-10 PROCEDURE — 84439 ASSAY OF FREE THYROXINE: CPT

## 2019-09-10 PROCEDURE — 36415 COLL VENOUS BLD VENIPUNCTURE: CPT | Mod: PO

## 2019-09-10 PROCEDURE — 80053 COMPREHEN METABOLIC PANEL: CPT

## 2019-09-10 PROCEDURE — 80061 LIPID PANEL: CPT

## 2019-09-10 PROCEDURE — 84443 ASSAY THYROID STIM HORMONE: CPT

## 2019-09-15 DIAGNOSIS — E03.9 HYPOTHYROIDISM, UNSPECIFIED TYPE: ICD-10-CM

## 2019-09-16 RX ORDER — LEVOTHYROXINE SODIUM 112 UG/1
TABLET ORAL
Qty: 30 TABLET | Refills: 0 | Status: SHIPPED | OUTPATIENT
Start: 2019-09-16 | End: 2019-10-15 | Stop reason: SDUPTHER

## 2019-09-17 ENCOUNTER — HOSPITAL ENCOUNTER (OUTPATIENT)
Dept: RADIOLOGY | Facility: HOSPITAL | Age: 69
Discharge: HOME | End: 2019-09-17
Attending: FAMILY MEDICINE
Payer: MEDICARE

## 2019-09-17 ENCOUNTER — OFFICE VISIT (OUTPATIENT)
Dept: FAMILY MEDICINE | Facility: CLINIC | Age: 69
End: 2019-09-17
Payer: MEDICARE

## 2019-09-17 VITALS
HEIGHT: 64 IN | WEIGHT: 254.19 LBS | DIASTOLIC BLOOD PRESSURE: 80 MMHG | HEART RATE: 68 BPM | BODY MASS INDEX: 43.4 KG/M2 | SYSTOLIC BLOOD PRESSURE: 134 MMHG

## 2019-09-17 DIAGNOSIS — Z23 NEED FOR VACCINATION: ICD-10-CM

## 2019-09-17 DIAGNOSIS — I25.10 ATHEROSCLEROSIS OF NATIVE CORONARY ARTERY OF NATIVE HEART WITHOUT ANGINA PECTORIS: ICD-10-CM

## 2019-09-17 DIAGNOSIS — B35.1 TOENAIL FUNGUS: ICD-10-CM

## 2019-09-17 DIAGNOSIS — E66.01 MORBID OBESITY: Primary | ICD-10-CM

## 2019-09-17 DIAGNOSIS — M25.562 ACUTE PAIN OF LEFT KNEE: ICD-10-CM

## 2019-09-17 DIAGNOSIS — E78.5 DYSLIPIDEMIA: ICD-10-CM

## 2019-09-17 DIAGNOSIS — E03.9 ACQUIRED HYPOTHYROIDISM: ICD-10-CM

## 2019-09-17 DIAGNOSIS — I10 ESSENTIAL HYPERTENSION: ICD-10-CM

## 2019-09-17 PROCEDURE — 99999 PR PBB SHADOW E&M-EST. PATIENT-LVL III: ICD-10-PCS | Mod: PBBFAC,,, | Performed by: FAMILY MEDICINE

## 2019-09-17 PROCEDURE — 99214 PR OFFICE/OUTPT VISIT, EST, LEVL IV, 30-39 MIN: ICD-10-PCS | Mod: S$PBB,,, | Performed by: FAMILY MEDICINE

## 2019-09-17 PROCEDURE — 73560 XR KNEE 1 OR 2 VIEW LEFT: ICD-10-PCS | Mod: 26,LT,, | Performed by: RADIOLOGY

## 2019-09-17 PROCEDURE — 73560 X-RAY EXAM OF KNEE 1 OR 2: CPT | Mod: 26,LT,, | Performed by: RADIOLOGY

## 2019-09-17 PROCEDURE — 73560 X-RAY EXAM OF KNEE 1 OR 2: CPT | Mod: TC,FY,PO,LT

## 2019-09-17 PROCEDURE — 99214 OFFICE O/P EST MOD 30 MIN: CPT | Mod: S$PBB,,, | Performed by: FAMILY MEDICINE

## 2019-09-17 PROCEDURE — 99999 PR PBB SHADOW E&M-EST. PATIENT-LVL III: CPT | Mod: PBBFAC,,, | Performed by: FAMILY MEDICINE

## 2019-09-17 PROCEDURE — G0009 ADMIN PNEUMOCOCCAL VACCINE: HCPCS | Mod: PBBFAC,PO

## 2019-09-17 PROCEDURE — 99213 OFFICE O/P EST LOW 20 MIN: CPT | Mod: PBBFAC,25,PO | Performed by: FAMILY MEDICINE

## 2019-09-17 RX ORDER — TERBINAFINE HYDROCHLORIDE 250 MG/1
250 TABLET ORAL DAILY
Qty: 84 TABLET | Refills: 0 | Status: SHIPPED | OUTPATIENT
Start: 2019-09-17 | End: 2019-10-17

## 2019-09-17 NOTE — PROGRESS NOTES
"Subjective:       Patient ID: Nancy Figueroa is a 69 y.o. female.    Chief Complaint: Annual Exam    Pt is known to me.  Pt is here for followup of chronic medical issues.  We reviewed her recent labs.  The pt reports that she fell about 2 months ago at [place]--she has had some left knee pain.  She feels the most pain over her knee cap with kneeling.  She has not required anything for pain recently.  Her pain is about a 2/10.  She is concerned about her weight.  She is interested in seeing a bariatrics doctor.  She has fungus of her left great toe nail.    Review of Systems   Constitutional: Negative for activity change, appetite change, fatigue and unexpected weight change.   Eyes: Negative for visual disturbance.   Respiratory: Negative for cough, chest tightness and shortness of breath.    Cardiovascular: Negative for chest pain, palpitations and leg swelling.   Gastrointestinal: Negative for abdominal pain, constipation, diarrhea, nausea and vomiting.   Endocrine: Negative for cold intolerance, heat intolerance and polyuria.   Genitourinary: Negative for decreased urine volume and dysuria.   Musculoskeletal: Positive for arthralgias. Negative for back pain.   Skin: Negative for rash.   Neurological: Negative for numbness and headaches.       Objective:       Vitals:    09/17/19 1103   BP: 134/80   BP Location: Left arm   Patient Position: Sitting   BP Method: Large (Manual)   Pulse: 68   Weight: 115.3 kg (254 lb 3.1 oz)   Height: 5' 4" (1.626 m)     Physical Exam   Constitutional: She is oriented to person, place, and time. She appears well-developed and well-nourished.   Pt is morbidly obese   HENT:   Head: Normocephalic.   Eyes: Pupils are equal, round, and reactive to light. Conjunctivae and EOM are normal.   Neck: Normal range of motion. Neck supple. No thyromegaly present.   Cardiovascular: Normal rate, regular rhythm and normal heart sounds.   Pulmonary/Chest: Effort normal and breath sounds " normal.   Abdominal: Soft. Bowel sounds are normal. There is no tenderness.   Musculoskeletal: She exhibits tenderness (over left patella). She exhibits no edema.   Neurological: She is alert and oriented to person, place, and time.   Skin: Skin is warm and dry.   Discolored thickened left great toenail   Psychiatric: She has a normal mood and affect. Her behavior is normal.       Assessment:       1. Morbid obesity    2. Acquired hypothyroidism    3. Essential hypertension    4. Atherosclerosis of native coronary artery of native heart without angina pectoris    5. Dyslipidemia    6. Need for vaccination    7. Acute pain of left knee    8. Toenail fungus        Plan:       Nancy was seen today for annual exam.    Diagnoses and all orders for this visit:    Morbid obesity  -     Ambulatory consult to Bariatric Medicine    Acquired hypothyroidism    Essential hypertension    Atherosclerosis of native coronary artery of native heart without angina pectoris    Dyslipidemia    Need for vaccination  -     (In Office Administered) Pneumococcal Polysaccharide Vaccine (23 Valent) (SQ/IM)    Acute pain of left knee  -     X-Ray Knee 1 or 2 View Left; Future    Toenail fungus  -     terbinafine HCl (LAMISIL) 250 mg tablet; Take 1 tablet (250 mg total) by mouth once daily.      During this visit, I reviewed the pt's history, medications, allergies, and problem list.

## 2019-09-19 ENCOUNTER — PATIENT MESSAGE (OUTPATIENT)
Dept: FAMILY MEDICINE | Facility: CLINIC | Age: 69
End: 2019-09-19

## 2019-10-04 ENCOUNTER — IMMUNIZATION (OUTPATIENT)
Dept: FAMILY MEDICINE | Facility: CLINIC | Age: 69
End: 2019-10-04
Payer: MEDICARE

## 2019-10-04 PROCEDURE — 90662 IIV NO PRSV INCREASED AG IM: CPT | Mod: PBBFAC,PO

## 2019-10-15 DIAGNOSIS — E03.9 HYPOTHYROIDISM, UNSPECIFIED TYPE: ICD-10-CM

## 2019-10-15 RX ORDER — LEVOTHYROXINE SODIUM 112 UG/1
TABLET ORAL
Qty: 90 TABLET | Refills: 1 | Status: SHIPPED | OUTPATIENT
Start: 2019-10-15 | End: 2020-02-24 | Stop reason: SDUPTHER

## 2019-10-17 DIAGNOSIS — E03.9 HYPOTHYROIDISM, UNSPECIFIED TYPE: ICD-10-CM

## 2019-10-17 NOTE — PROGRESS NOTES
Refill Authorization Note     is requesting a refill authorization.    Brief assessment and rationale for refill: QUICK DC: rts(4/20)  Name and strength of medication: levothyroxine (SYNTHROID) 112 MCG tablet       Medication Therapy Plan: e-prescribed (10/19); rts(4/20); quick dc    Medication reconciliation completed: No              How patient will take medication: t1t po qam before breakfast          Comments:.  Appointments past 12m or future 3m    Date Provider   Last Visit   9/17/2019 KAREEM Pantoja MD   Next Visit   3/18/2020 KAREEM Pantoja MD     Last Prescribed Info:      Ordering Provider: -- THEODORE #:  -- NPI:  --    Authorizing Provider: KAREEM Pantoja MD THEODORE #:  [id number] NPI:  [id number]    Ordering User:  KAREEM Pantoja MD            Diagnosis Association: Hypothyroidism, unspecified type (E03.9)      Original Order:  levothyroxine (SYNTHROID) 112 MCG tablet [[id number]]      Pharmacy:  [facility] #[id number] [address] THEODORE #:  [id number]     Pharmacy Comments:  --          Fill quantity remaining:  -- Fill quantity used:  -- Next fill due: --       Outpatient Medication Detail      Disp Refills Start End    levothyroxine (SYNTHROID) 112 MCG tablet 90 tablet 1 10/15/2019     Sig: TAKE 1 TABLET BY MOUTH BEFORE BREAKFAST    Sent to pharmacy as: levothyroxine (SYNTHROID) 112 MCG tablet    E-Prescribing Status: Receipt confirmed by pharmacy (10/15/2019 12:47 PM CDT)

## 2019-10-18 RX ORDER — LEVOTHYROXINE SODIUM 112 UG/1
TABLET ORAL
Qty: 30 TABLET | Refills: 0 | OUTPATIENT
Start: 2019-10-18

## 2019-10-25 ENCOUNTER — OFFICE VISIT (OUTPATIENT)
Dept: BARIATRICS | Facility: CLINIC | Age: 69
End: 2019-10-25
Payer: MEDICARE

## 2019-10-25 VITALS
HEIGHT: 64 IN | BODY MASS INDEX: 44.83 KG/M2 | RESPIRATION RATE: 16 BRPM | DIASTOLIC BLOOD PRESSURE: 83 MMHG | HEART RATE: 71 BPM | WEIGHT: 262.56 LBS | SYSTOLIC BLOOD PRESSURE: 130 MMHG

## 2019-10-25 DIAGNOSIS — E78.5 DYSLIPIDEMIA: ICD-10-CM

## 2019-10-25 DIAGNOSIS — I10 ESSENTIAL HYPERTENSION: ICD-10-CM

## 2019-10-25 DIAGNOSIS — E66.01 MORBID OBESITY WITH BMI OF 45.0-49.9, ADULT: ICD-10-CM

## 2019-10-25 DIAGNOSIS — E66.01 MORBID OBESITY: Primary | ICD-10-CM

## 2019-10-25 PROCEDURE — 99205 PR OFFICE/OUTPT VISIT, NEW, LEVL V, 60-74 MIN: ICD-10-PCS | Mod: S$PBB,,, | Performed by: SURGERY

## 2019-10-25 PROCEDURE — 99999 PR PBB SHADOW E&M-EST. PATIENT-LVL IV: ICD-10-PCS | Mod: PBBFAC,,, | Performed by: SURGERY

## 2019-10-25 PROCEDURE — 99205 OFFICE O/P NEW HI 60 MIN: CPT | Mod: S$PBB,,, | Performed by: SURGERY

## 2019-10-25 PROCEDURE — 99214 OFFICE O/P EST MOD 30 MIN: CPT | Mod: PBBFAC,PO | Performed by: SURGERY

## 2019-10-25 PROCEDURE — 99999 PR PBB SHADOW E&M-EST. PATIENT-LVL IV: CPT | Mod: PBBFAC,,, | Performed by: SURGERY

## 2019-10-25 NOTE — PROGRESS NOTES
Initial Consult    Chief Complaint   Patient presents with    Obesity       History of Present Illness:  Patient is a 69 y.o. female who is referred for evaluation of surgical treatment of morbid obesity. Her Body mass index is 45.78 kg/m². She has known comorbidities of coronary artery disease, dyslipidemia and hypertension. She has attended the bariatric seminar and is most interested in gastric sleeve surgery.      Past attempts at weight loss include: Unsupervised: calorie counting, low carb, slim fast, sugar buster;  Supervised:  TOPS, weight watchers, nutri-system;  Diet pills: dexatrim;  Exercise attempts: walking or running, treadmill, swimming, group classes     Weight history:   At current weight:  2 years  Obese for 10 years.  More than 35 pounds overweight for 25 years.  More than 100 pounds overweight for 5 years.  Started dieting at 22 years old.  Maximum weight reached: 262 pounds  Most weight lost was 80 pounds through weight watchers for 1 years.  She describes Her eating habits as sweet eater, volume eater, snacker/grazer, emotional eater.    KAYLAH screening:  Snores but sleeps well    Reflux screening: controlled heartburn     Review of patient's allergies indicates:  No Known Allergies    Current Outpatient Medications   Medication Sig Dispense Refill    amLODIPine (NORVASC) 2.5 MG tablet TAKE 1 TABLET(2.5 MG) BY MOUTH EVERY DAY 90 tablet 4    aspirin (ECOTRIN) 81 MG EC tablet Take 81 mg by mouth once daily. Pt took 5 or 6 before coming to ED.      atorvastatin (LIPITOR) 10 MG tablet Take 1 tablet (10 mg total) by mouth once daily. 90 tablet 4    carvedilol (COREG) 6.25 MG tablet Take 1 tablet (6.25 mg total) by mouth 2 (two) times daily. 180 tablet 4    clopidogrel (PLAVIX) 75 mg tablet Take 1 tablet (75 mg total) by mouth once daily. 90 tablet 4    levothyroxine (SYNTHROID) 112 MCG tablet TAKE 1 TABLET BY MOUTH BEFORE BREAKFAST 90 tablet 1    valsartan-hydrochlorothiazide (DIOVAN-HCT)  320-25 mg per tablet TAKE 1 TABLET BY MOUTH EVERY MORNING 90 tablet 4    azelastine (ASTELIN) 137 mcg (0.1 %) nasal spray 1 spray (137 mcg total) by Nasal route 2 (two) times daily. (Patient not taking: Reported on 10/25/2019) 30 mL 11    levothyroxine (SYNTHROID) 112 MCG tablet TAKE 1 TABLET BY MOUTH BEFORE BREAKFAST (Patient not taking: Reported on 10/25/2019) 30 tablet 0    meclizine (ANTIVERT) 12.5 mg tablet 1 tablet 3-4x daily as needed. (Patient not taking: Reported on 10/25/2019) 30 tablet 1    nitroGLYCERIN (NITROSTAT) 0.4 MG SL tablet Place 1 tablet (0.4 mg total) under the tongue every 5 (five) minutes as needed for Chest pain. (Patient not taking: Reported on 10/25/2019) 30 tablet 12     No current facility-administered medications for this visit.        Past Medical History:   Diagnosis Date    Atherosclerotic heart disease of native coronary artery without angina pectoris 5/1/2017 4/17  Left main: normal  LAD: two diagonals  Less than 50%.  90% mid LAD Circumflex:  minimal disease, less than 20%. --  RCA: The vessel was large sized (dominant) with less than 20% stenosis.    Cataract     OU    Essential hypertension 10/18/2012    GERD (gastroesophageal reflux disease)     HTN (hypertension)     Hypothyroidism     NSTEMI (non-ST elevated myocardial infarction) 4/24/2017 4/2017    Stented coronary artery 7/26/2017 4/17 LAD-Synergy 3.0 x 28      Past Surgical History:   Procedure Laterality Date    APPENDECTOMY      COLONOSCOPY      HYSTERECTOMY      YANG with BSO     Family History   Problem Relation Age of Onset    Cancer Mother 80        breast cancer    Dementia Mother     Cataracts Mother     Breast cancer Mother 70    Cancer Father         lung cancer    Cataracts Father     Cancer Paternal Grandmother         colon    Obesity Sister     Obesity Sister     Obesity Sister      Social History     Tobacco Use    Smoking status: Never Smoker    Smokeless tobacco: Never  Used   Substance Use Topics    Alcohol use: No    Drug use: No        Chart review:    9/17/19: Dr. Pantoja : treated for morbid obesity, acquired hypothyroidism, essential hypertension, atherosclerosis, dld, need for vaccination, acute left knee, toenail fungus    Lab review:    Lab Results   Component Value Date    HGBA1C 5.6 04/23/2017     Lab Results   Component Value Date    CHOL 159 09/10/2019    CHOL 159 09/10/2019    CHOL 156 10/24/2018     Lab Results   Component Value Date    HDL 49 09/10/2019    HDL 49 09/10/2019    HDL 59 10/24/2018     Lab Results   Component Value Date    LDLCALC 80.0 09/10/2019    LDLCALC 80.0 09/10/2019    LDLCALC 77.2 10/24/2018     Lab Results   Component Value Date    TRIG 150 09/10/2019    TRIG 150 09/10/2019    TRIG 99 10/24/2018     Lab Results   Component Value Date    CHOLHDL 30.8 09/10/2019    CHOLHDL 30.8 09/10/2019    CHOLHDL 37.8 10/24/2018     Lab Results   Component Value Date    TSH 1.169 09/10/2019    TSH 1.169 09/10/2019       Radiology review:    None     Review of Systems:  Review of Systems   Constitutional: Positive for fever. Negative for chills and diaphoresis.   HENT: Positive for trouble swallowing. Negative for congestion, sinus pressure, sneezing, sore throat, tinnitus and voice change.    Eyes: Negative for pain, redness and itching.   Respiratory: Positive for shortness of breath. Negative for apnea, cough, choking, chest tightness, wheezing and stridor.    Cardiovascular: Negative for chest pain, palpitations and leg swelling.   Gastrointestinal: Negative for abdominal pain, anal bleeding, constipation, diarrhea, nausea and vomiting.   Endocrine: Positive for cold intolerance. Negative for heat intolerance.   Genitourinary: Positive for frequency and urgency. Negative for difficulty urinating and dysuria.   Musculoskeletal: Positive for back pain and neck pain. Negative for arthralgias, gait problem and neck stiffness.   Skin: Negative for rash and  "wound.   Allergic/Immunologic: Negative for environmental allergies and food allergies.   Neurological: Positive for dizziness. Negative for light-headedness and headaches.   Hematological: Negative for adenopathy. Bruises/bleeds easily.   Psychiatric/Behavioral: Negative for agitation and confusion.       Physical:     Vital Signs (Most Recent)  Pulse: 71 (10/25/19 1044)  Resp: 16 (10/25/19 1044)  BP: 130/83 (10/25/19 1044)  5' 3.5" (1.613 m)  119.1 kg (262 lb 9.1 oz)     Body comp:  Fat Percent:  56.7 %  Fat Mass:  144.4 lb  FFM:  110.4 lb  TBW: 80.6 lb  TBW %:  31.6 %  BMR: 1630 kcal          Physical Exam:  Physical Exam   Constitutional: She is oriented to person, place, and time. She appears well-developed and well-nourished. No distress.   HENT:   Head: Normocephalic and atraumatic.   Mouth/Throat: No oropharyngeal exudate.   Eyes: Pupils are equal, round, and reactive to light. Conjunctivae and EOM are normal. No scleral icterus.   Neck: Normal range of motion. Neck supple. No JVD present. No tracheal deviation present. No thyromegaly present.   Cardiovascular: Normal rate, regular rhythm and normal heart sounds. Exam reveals no gallop and no friction rub.   No murmur heard.  Pulmonary/Chest: Effort normal and breath sounds normal. No stridor. No respiratory distress. She has no wheezes. She has no rales. She exhibits no tenderness.   Abdominal: Soft. Bowel sounds are normal. She exhibits no distension and no mass. There is no tenderness. There is no rebound and no guarding.   Musculoskeletal: Normal range of motion. She exhibits no edema or tenderness.   Lymphadenopathy:     She has no cervical adenopathy.   Neurological: She is alert and oriented to person, place, and time. No cranial nerve deficit.   Skin: Skin is warm and dry. No rash noted. She is not diaphoretic. No erythema.   Psychiatric: She has a normal mood and affect. Her behavior is normal.   Nursing note and vitals " reviewed.      ASSESSMENT/PLAN:        1. Morbid obesity  Ambulatory consult to Cardiology    EKG 12-lead    Ambulatory consult to Psychology    FL Upper GI Without KUB   2. Morbid obesity with BMI of 45.0-49.9, adult     3. Essential hypertension     4. Dyslipidemia         Plan:  Nancy Figueroa has morbid obesity as their Body mass index is 45.78 kg/m². She would benefit from weight loss surgery and has chosen gastric sleeve surgery as the preferred procedure. She understands that this is a tool and lifestyle change will be necessary to keep weight off. I went over possible complications of all surgeries with the patient and she is agreeable to surgery.    She will need:     Labs  EKG  UGI   dietary consult  psych consult   Seminar    I will obtain the following clearances prior to surgery: Cardiology         Diet plan: high protein low carb- mainly meats and vegetables  Exercise plan: Cardiovascular exercise, get HR over 100 for 20 minutes 3 times per week.  Start multivitamin

## 2019-10-25 NOTE — LETTER
October 25, 2019      KAREEM Pantoja MD  [address] [facility] [address] [place] [address]           [facility]  [address] [place] [address]  Phone: [phone]  Fax: [phone]          Patient: Nancy Figueroa   MR Number: [id number]   YOB: 1950   Date of Visit: 10/25/2019       Dear Dr. KAREEM Pantoja:    Thank you for referring Nancy Figueroa to me for evaluation. Attached you will find relevant portions of my assessment and plan of care.    If you have questions, please do not hesitate to call me. I look forward to following Nancy Figueroa along with you.    Sincerely,    Cirilo Mathias MD    Enclosure  CC:  No Recipients    If you would like to receive this communication electronically, please contact [email or web address] or [phone] to request more information on [facility] access.    For providers and/or their staff who would like to refer a patient to [facility], please contact us through our one-stop-shop provider referral line, [facility], at [phone].    If you feel you have received this communication in error or would no longer like to receive these types of communications, please e-mail [email or web address]

## 2019-10-31 ENCOUNTER — CLINICAL SUPPORT (OUTPATIENT)
Dept: BARIATRICS | Facility: CLINIC | Age: 69
End: 2019-10-31
Payer: MEDICARE

## 2019-10-31 VITALS — WEIGHT: 255.75 LBS | BODY MASS INDEX: 43.66 KG/M2 | HEIGHT: 64 IN

## 2019-10-31 DIAGNOSIS — E66.01 MORBID OBESITY: ICD-10-CM

## 2019-10-31 DIAGNOSIS — I10 ESSENTIAL HYPERTENSION: ICD-10-CM

## 2019-10-31 DIAGNOSIS — Z71.3 DIETARY COUNSELING: ICD-10-CM

## 2019-10-31 DIAGNOSIS — E78.5 DYSLIPIDEMIA: ICD-10-CM

## 2019-10-31 PROCEDURE — 99999 PR PBB SHADOW E&M-EST. PATIENT-LVL II: CPT | Mod: PBBFAC,,, | Performed by: DIETITIAN, REGISTERED

## 2019-10-31 PROCEDURE — 97802 MEDICAL NUTRITION INDIV IN: CPT | Mod: PBBFAC,PN,59 | Performed by: DIETITIAN, REGISTERED

## 2019-10-31 PROCEDURE — 99999 PR PBB SHADOW E&M-EST. PATIENT-LVL II: ICD-10-PCS | Mod: PBBFAC,,, | Performed by: DIETITIAN, REGISTERED

## 2019-10-31 PROCEDURE — 99212 OFFICE O/P EST SF 10 MIN: CPT | Mod: PBBFAC,PN | Performed by: DIETITIAN, REGISTERED

## 2019-10-31 NOTE — PROGRESS NOTES
"NUTRITIONAL CONSULT    Referring Physician:Dr. Mathias   Reason for MNT Referral: Initial assessment for sleeve gastrectomy work-up    PAST MEDICAL HISTORY:   69 y.o. female  Body mass index is 44.59 kg/m²..    Past attempts at weight loss include: Unsupervised: calorie counting, low carb, slim fast, sugar buster;  Supervised:  TOPS, weight watchers, nutri-system;  Diet pills: dexatrim;  Exercise attempts: walking or running, treadmill, swimming, group classes      Weight history:   At current weight:  2 years  Obese for 10 years.  More than 35 pounds overweight for 25 years.  More than 100 pounds overweight for 5 years.  Started dieting at 22 years old.  Maximum weight reached: 262 pounds  Most weight lost was 80 pounds through weight watchers for 1 years.      Past Medical History:   Diagnosis Date    Atherosclerotic heart disease of native coronary artery without angina pectoris 5/1/2017 4/17  Left main: normal  LAD: two diagonals  Less than 50%.  90% mid LAD Circumflex:  minimal disease, less than 20%. --  RCA: The vessel was large sized (dominant) with less than 20% stenosis.    Cataract     OU    Essential hypertension 10/18/2012    GERD (gastroesophageal reflux disease)     HTN (hypertension)     Hypothyroidism     NSTEMI (non-ST elevated myocardial infarction) 4/24/2017 4/2017    Stented coronary artery 7/26/2017 4/17 LAD-Synergy 3.0 x 28        CLINICAL DATA:  69 y.o.-year-old White female.  Height: 5'3.5"  Weight: 255 lbs ( initial consult with  lbs)   IBW: 120 lbs +/- 10%  BMI: 44.59  The patient's goal weight (50% EBW): 184 lbs  Personal goal weight: 160-180 lbs    Goal for Bariatric Surgery: to improve health, to improve quality of life, to lose weight and to prevent future medical conditions    NUTRITIONAL NEEDS:  1400 Calories (using BMR)  60-80 Grams Protein per ASMBS guidelines    NUTRITION & HEALTH HISTORY:  Greatest challenge: sweets and starchy CHO    Current diet recall: "     Breakfast: eggs  Snack: crackers and cheese  Lunch: chicken  Breast  Dinner: squash and hamburger zuleyma        Current Diet:  Meal pattern: 3  Protein supplements:  Has tried protein water   Snackin / day  Vegetables: Likes a variety. Eats daily.  Fruits: Likes a variety. Eats almost daily.  Beverages: coffee without sugar  Dining out: Monthly. Reduced lately. Mostly restaurants.  Cooking at home: Daily. Mostly baked, grilled and fried meat, fish, starchy CHO and vegetables.    Exercise:  Past exercise: Fair    Current exercise:  gym 3 X/ week for 1 hour silver sneakers   Restrictions to exercise: none    Vitamins / Minerals / Herbs:   none      Labs:    no recent, none available at this time    Food Allergies:   none    Social:  Retired.  Lives with  . - He has gastric bypass 10 years ago  Grocery shopping and food prep self /  .  Patient believes the household will be supportive after surgery.  Alcohol: None.  Smoking: None.    ASSESSMENT:  · Patient reports attempts at weight loss, only to regain lost weight.  · Patient demonstrated knowledge of healthy eating behaviors and exercise patterns; admits to not eating healthy and not exercising at this point.  · Patient states willingness and demonstrates willingness to change lifestyle and make behavior modifications as evidenced by weight loss, good exercise, dietary changes, increased vegetables, reduced dining out and reduced portion sizes.    Insurance requires medically supervised diet prior to consideration for bariatric surgery.    Barriers to Education: none    Stage of change: determination    NUTRITION DIAGNOSIS:    Obesity related to Food and nutrition related knowledge deficit and Excessive carbohydrate intake as evidence by BMI.    BARIATRIC DIET DISCUSSION/PLAN:  Discussed diet after surgery and related to patient's food record.  Reviewed nutrition guidelines for before and after surgery.  Answered all questions.  Resume work-up  for surgery.  Continue to review Bariatric Nutrition Guidebook at home and call with any questions.  Work on Bariatric Nutrition Checklist.  Work on expanding variety of vegetables.  Work on gradually cutting back on starchy CHO in the diet.  Start including protein supplements in the diet plan daily.  Start shopping for bariatric vitamins & minerals.  return to clinic, high protein low carb snacks     RECOMMENDATIONS:  Patient is a potential candidate for bariatric surgery.    Needs additional visit(s) with MD.    Patient  And sister verbalized understanding.    Expect good  compliance after surgery at this time.    Communicated nutrition plan with bariatric team.    SESSION TIME:  60 minutes

## 2019-11-14 ENCOUNTER — HOSPITAL ENCOUNTER (OUTPATIENT)
Dept: RADIOLOGY | Facility: HOSPITAL | Age: 69
Discharge: HOME | End: 2019-11-14
Attending: SURGERY
Payer: MEDICARE

## 2019-11-14 ENCOUNTER — CLINICAL SUPPORT (OUTPATIENT)
Dept: CARDIOLOGY | Facility: CLINIC | Age: 69
End: 2019-11-14
Payer: MEDICARE

## 2019-11-14 DIAGNOSIS — E66.01 MORBID OBESITY: ICD-10-CM

## 2019-11-14 PROCEDURE — 93005 ELECTROCARDIOGRAM TRACING: CPT | Mod: PBBFAC,PO | Performed by: INTERNAL MEDICINE

## 2019-11-14 PROCEDURE — 93010 ELECTROCARDIOGRAM REPORT: CPT | Mod: S$PBB,,, | Performed by: INTERNAL MEDICINE

## 2019-11-14 PROCEDURE — 93010 EKG 12-LEAD: ICD-10-PCS | Mod: S$PBB,,, | Performed by: INTERNAL MEDICINE

## 2019-11-14 PROCEDURE — 74240 FL UPPER GI WITHOUT KUB: ICD-10-PCS | Mod: 26,,, | Performed by: RADIOLOGY

## 2019-11-14 PROCEDURE — 25500020 PHARM REV CODE 255: Mod: PO | Performed by: SURGERY

## 2019-11-14 PROCEDURE — A9698 NON-RAD CONTRAST MATERIALNOC: HCPCS | Mod: PO | Performed by: SURGERY

## 2019-11-14 PROCEDURE — 74240 X-RAY XM UPR GI TRC 1CNTRST: CPT | Mod: TC,FY,PO

## 2019-11-14 PROCEDURE — 74240 X-RAY XM UPR GI TRC 1CNTRST: CPT | Mod: 26,,, | Performed by: RADIOLOGY

## 2019-11-14 RX ADMIN — Medication 350 ML: at 09:11

## 2019-11-22 ENCOUNTER — OFFICE VISIT (OUTPATIENT)
Dept: BARIATRICS | Facility: CLINIC | Age: 69
End: 2019-11-22
Payer: MEDICARE

## 2019-11-22 VITALS
WEIGHT: 253.63 LBS | TEMPERATURE: 97 F | HEART RATE: 69 BPM | RESPIRATION RATE: 17 BRPM | HEIGHT: 64 IN | BODY MASS INDEX: 43.3 KG/M2 | DIASTOLIC BLOOD PRESSURE: 80 MMHG | SYSTOLIC BLOOD PRESSURE: 137 MMHG

## 2019-11-22 DIAGNOSIS — E66.01 MORBID OBESITY WITH BMI OF 40.0-44.9, ADULT: ICD-10-CM

## 2019-11-22 DIAGNOSIS — E66.01 MORBID OBESITY: Primary | ICD-10-CM

## 2019-11-22 DIAGNOSIS — I10 ESSENTIAL HYPERTENSION: ICD-10-CM

## 2019-11-22 DIAGNOSIS — E78.5 DYSLIPIDEMIA: ICD-10-CM

## 2019-11-22 PROCEDURE — 99213 OFFICE O/P EST LOW 20 MIN: CPT | Mod: PBBFAC,PO | Performed by: SURGERY

## 2019-11-22 PROCEDURE — 1126F PR PAIN SEVERITY QUANTIFIED, NO PAIN PRESENT: ICD-10-PCS | Mod: ,,, | Performed by: SURGERY

## 2019-11-22 PROCEDURE — 1159F MED LIST DOCD IN RCRD: CPT | Mod: ,,, | Performed by: SURGERY

## 2019-11-22 PROCEDURE — 1159F PR MEDICATION LIST DOCUMENTED IN MEDICAL RECORD: ICD-10-PCS | Mod: ,,, | Performed by: SURGERY

## 2019-11-22 PROCEDURE — 99999 PR PBB SHADOW E&M-EST. PATIENT-LVL III: ICD-10-PCS | Mod: PBBFAC,,, | Performed by: SURGERY

## 2019-11-22 PROCEDURE — 99999 PR PBB SHADOW E&M-EST. PATIENT-LVL III: CPT | Mod: PBBFAC,,, | Performed by: SURGERY

## 2019-11-22 PROCEDURE — 1126F AMNT PAIN NOTED NONE PRSNT: CPT | Mod: ,,, | Performed by: SURGERY

## 2019-11-22 PROCEDURE — 99213 PR OFFICE/OUTPT VISIT, EST, LEVL III, 20-29 MIN: ICD-10-PCS | Mod: S$PBB,,, | Performed by: SURGERY

## 2019-11-22 PROCEDURE — 99213 OFFICE O/P EST LOW 20 MIN: CPT | Mod: S$PBB,,, | Performed by: SURGERY

## 2019-11-22 NOTE — PROGRESS NOTES
Medically Supervised Weight Loss Documentation    Date of Visit: 11/22/2019    Patient: Nancy Figueroa    Current Weight:253  Current BMI: Body mass index is 44.22 kg/m².  Weight Change: - 9 pounds     Last Weight:262    Beginning Weight: 262      Vitals:   Vitals:    11/22/19 0927   BP: 137/80   Pulse: 69   Resp: 17   Temp: 97.3 °F (36.3 °C)       Comorbidities:   Past Medical History:   Diagnosis Date    Atherosclerotic heart disease of native coronary artery without angina pectoris 5/1/2017 4/17  Left main: normal  LAD: two diagonals  Less than 50%.  90% mid LAD Circumflex:  minimal disease, less than 20%. --  RCA: The vessel was large sized (dominant) with less than 20% stenosis.    Cataract     OU    Essential hypertension 10/18/2012    GERD (gastroesophageal reflux disease)     HTN (hypertension)     Hypothyroidism     NSTEMI (non-ST elevated myocardial infarction) 4/24/2017 4/2017    Stented coronary artery 7/26/2017 4/17 LAD-Synergy 3.0 x 28        Medications:  Current Outpatient Medications on File Prior to Visit   Medication Sig Dispense Refill    amLODIPine (NORVASC) 2.5 MG tablet TAKE 1 TABLET(2.5 MG) BY MOUTH EVERY DAY 90 tablet 4    aspirin (ECOTRIN) 81 MG EC tablet Take 81 mg by mouth once daily. Pt took 5 or 6 before coming to ED.      atorvastatin (LIPITOR) 10 MG tablet Take 1 tablet (10 mg total) by mouth once daily. 90 tablet 4    azelastine (ASTELIN) 137 mcg (0.1 %) nasal spray 1 spray (137 mcg total) by Nasal route 2 (two) times daily. 30 mL 11    carvedilol (COREG) 6.25 MG tablet Take 1 tablet (6.25 mg total) by mouth 2 (two) times daily. 180 tablet 4    clopidogrel (PLAVIX) 75 mg tablet Take 1 tablet (75 mg total) by mouth once daily. 90 tablet 4    levothyroxine (SYNTHROID) 112 MCG tablet TAKE 1 TABLET BY MOUTH BEFORE BREAKFAST 30 tablet 0    levothyroxine (SYNTHROID) 112 MCG tablet TAKE 1 TABLET BY MOUTH BEFORE BREAKFAST 90 tablet 1    meclizine (ANTIVERT) 12.5  mg tablet 1 tablet 3-4x daily as needed. 30 tablet 1    nitroGLYCERIN (NITROSTAT) 0.4 MG SL tablet Place 1 tablet (0.4 mg total) under the tongue every 5 (five) minutes as needed for Chest pain. 30 tablet 12    valsartan-hydrochlorothiazide (DIOVAN-HCT) 320-25 mg per tablet TAKE 1 TABLET BY MOUTH EVERY MORNING 90 tablet 4     No current facility-administered medications on file prior to visit.          Body comp:  Fat Percent:  56.9 %  Fat Mass:  144.2 lb  FFM:  109.4 lb  TBW: 80 lb  TBW %:  31.5 %  BMR: 1617 kcal      Diet Education Discussed:    Breakfast:  omlette with vegetables and cheese  Snack: protein shake  Lunch:  sandwich with 1 piece of bread  Snack: cheese and crackers  Dinner:  Vegetables and meat    Exercise/Activity Discussed:    None yet    Behavior or Diet Goals for this patient:    Limit carb snacks.  Can use some fruits for snack such as blueberry, raspberry, strawberry.   Get on exercise routine 20 minutes 3 times per week    Labs- reviewed  EKG  UGI - moderate sized hiatal hernia, moderate reflux   dietary consult- done  psych consult - monday  Seminar     I will obtain the following clearances prior to surgery: Cardiology - Dr. Gary- send clearance request         : I met with the patient for 15 minutes and counseled her for over 50% of that time

## 2019-11-25 ENCOUNTER — OFFICE VISIT (OUTPATIENT)
Dept: PSYCHIATRY | Facility: CLINIC | Age: 69
End: 2019-11-25
Payer: MEDICARE

## 2019-11-25 VITALS
SYSTOLIC BLOOD PRESSURE: 117 MMHG | BODY MASS INDEX: 44.08 KG/M2 | HEART RATE: 66 BPM | WEIGHT: 258.19 LBS | DIASTOLIC BLOOD PRESSURE: 77 MMHG | HEIGHT: 64 IN

## 2019-11-25 DIAGNOSIS — E03.9 ACQUIRED HYPOTHYROIDISM: ICD-10-CM

## 2019-11-25 DIAGNOSIS — Z71.89 ENCOUNTER FOR PSYCHOLOGICAL ASSESSMENT PRIOR TO BARIATRIC SURGERY: Primary | ICD-10-CM

## 2019-11-25 DIAGNOSIS — I10 ESSENTIAL HYPERTENSION: ICD-10-CM

## 2019-11-25 DIAGNOSIS — E66.01 MORBID OBESITY: ICD-10-CM

## 2019-11-25 PROCEDURE — 90792 PR PSYCHIATRIC DIAGNOSTIC EVALUATION W/MEDICAL SERVICES: ICD-10-PCS | Mod: ,,, | Performed by: PSYCHIATRY & NEUROLOGY

## 2019-11-25 PROCEDURE — 90792 PSYCH DIAG EVAL W/MED SRVCS: CPT | Mod: ,,, | Performed by: PSYCHIATRY & NEUROLOGY

## 2019-11-25 PROCEDURE — 99999 PR PBB SHADOW E&M-EST. PATIENT-LVL II: CPT | Mod: PBBFAC,,, | Performed by: PSYCHIATRY & NEUROLOGY

## 2019-11-25 PROCEDURE — 99212 OFFICE O/P EST SF 10 MIN: CPT | Mod: PBBFAC,PO | Performed by: PSYCHIATRY & NEUROLOGY

## 2019-11-25 PROCEDURE — 99999 PR PBB SHADOW E&M-EST. PATIENT-LVL II: ICD-10-PCS | Mod: PBBFAC,,, | Performed by: PSYCHIATRY & NEUROLOGY

## 2019-11-25 NOTE — PROGRESS NOTES
"ID: 68 yo WF with no prior psych hx. Here for bariatric surgical psych eval.     CC:  bariatric surgical psych eval.  HPI: presents on time, chart reviewed. No previous psych eval, no prior psych meds.      had bariatric surgery 10yrs ago and has done very well, "and people thought that was the easy way out and it wasn't, this isn't easy so I am aware of that."     "My sister joined [employer] and did great and lost 80lbs and I did it with her and when I did it I knew that I would try that and if it didn't work I would consider the surgery. it didn't work well for me. It was my last effort prior to committing to the surgery. i've really been tossing this idea around since my  did it. I just feel like it's time if I want to live much longer to make a big change."     Currently pt and her  don't eat the same things, "we're both retired so it's a little weird but we both just prepare our own meals so that I wasn't tied to his needs and he could manage his own. So he's kept his wait off and in an odd turn of events initially after his surgery I gained 50lbs because he would say, 'here, finish this or I can't eat this, you have it.' so now we will be able to share a meal which will be really nice."     Goes to [facility] [place] x year, sometimes with grandchildren and at times just she and - last visit felt physically taxing to her, "I just don't want to feel like that when doing things I know I love to do."     No prior psych hx. Has had several recent losses, mom passed 9/2018, brother 10/2019, retired in 2017 from a job of 70+ hrs/wk, "so there was a time when I mentioned to my cardiologist that I wondered if I needed some help and he asked that I give it a little time and he was right. i'm feeling better and learning things I enjoy doing again. When you retire you really have to relearn that stuff. I have time I haven't had in years."     On Psychiatric ROS:    Endorses "pretty good" " "sleep- no use of sleep aid , endorses some low level anhedonia- "I retired a year and a half ago and I don't remember what I like to do. i'm trying to find that out.", enjoys her family and granddaughter, remains engaged with family, denies feeling helpless/hopeless, dec energy- attributes this to weight, stable concentration, dec appetite- has cut sweets including sodas, denies dec PMA    Denies thoughts of SI/intent/plan.     Denies feeling easily overwhelmed, denies ruminative thinking, denies feeling tense/"on edge"    Denies h/o panic attack  Denies h/o hypo/manic sxs.   Denies h/o psychosis.   Denies h/o trauma leading to nightmares, re-experiencing, avoidance or hyperarousal.    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  also retired. Lives in a home with .     Musculoskeletal:  Muscle strength/Tone: no dyskinesia/ no tremor  Gait/Station- non antalgic, no assistance needed    MSE: appears stated age, well groomed, appropriate dress, engages well with examiner. Good e/c. Speech reg rate and vol, nonpressured. Mood is "i'm in a good mood." Affect congruent. No physical evidence of emotion. Sensorium fully intact. Oriented to date/day/location, current events. Narrative memory intact. Intellectual function is avg based on vocab and basic " "fund of knowledge. Thought is c/l/gd. No tangentiality or circumstantiality. No FOI/WILFRID. Denies SI/HI. Denies A/VH. No evidence of delusions. Insight and Judgment intact.     Blood pressure 117/77, pulse 66, height 5' 3.5" (1.613 m), weight 117.1 kg (258 lb 2.5 oz).    Suicide Risk Assessment:   Protective- age, gender, no prior attempts, no prior hospitalizations, no family h/o attempts, no ongoing substance abuse, no psychosis, , has children, denies SI/intent/plan, seeking treatment, access to treatment, future oriented, good primary support, no access to firearms    Risk- race, ongoing Axis I sxs    **Pt is at LOW imminent and long term risk of suicide given current risk factors.    Assessment:  70 yo WF with no prior psych hx. Here for bariatric surgical psych eval. The pt does not meet criteria for any mental health diagnosis today. She appears to understand the commitment she is making by undergoing wgt reduction surgery and does not have any disabilities that would prevent understanding or following directions. I have not identified any addictive or mental health disorder that would impact her ability to maintain the behavioral modifications necessary for continued healthy weight loss. Has great support through  who had the surgery 10 yrs ago and has maintained healthy weight loss- pt with children and grandchildren and motivated for better quality life with inc'd years in health.     Axis I: bariatric surgery psych eval  Axis II: none at this time   Axis III: htn, hlp, h/o MI, obesity  Axis IV: health concerns  Axis V: GAF 80    Plan:   1. No need for cont'd psychiatric txmt  2. No psych c/i to bariatric surgery  3. Will alert  to the completed evaluation    -Spent 60min face to face with the pt; >50% time spent in counseling   -Supportive therapy and psychoeducation provided  -R/B/SE's of medications discussed with the pt who expresses understanding and chooses to take medications " as prescribed.   -Pt instructed to call clinic, 911 or go to nearest emergency room if sxs worsen or pt is in   crisis. The pt expresses understanding.

## 2020-01-10 ENCOUNTER — OFFICE VISIT (OUTPATIENT)
Dept: BARIATRICS | Facility: CLINIC | Age: 70
End: 2020-01-10
Payer: MEDICARE

## 2020-01-10 VITALS
TEMPERATURE: 97 F | WEIGHT: 256.38 LBS | HEIGHT: 64 IN | HEART RATE: 73 BPM | SYSTOLIC BLOOD PRESSURE: 134 MMHG | BODY MASS INDEX: 43.77 KG/M2 | DIASTOLIC BLOOD PRESSURE: 72 MMHG

## 2020-01-10 DIAGNOSIS — E66.01 MORBID OBESITY: Primary | ICD-10-CM

## 2020-01-10 DIAGNOSIS — I25.10 ATHEROSCLEROSIS OF NATIVE CORONARY ARTERY OF NATIVE HEART WITHOUT ANGINA PECTORIS: ICD-10-CM

## 2020-01-10 DIAGNOSIS — E66.01 MORBID OBESITY WITH BMI OF 40.0-44.9, ADULT: ICD-10-CM

## 2020-01-10 DIAGNOSIS — I10 ESSENTIAL HYPERTENSION: ICD-10-CM

## 2020-01-10 DIAGNOSIS — E78.5 DYSLIPIDEMIA: ICD-10-CM

## 2020-01-10 PROCEDURE — 99215 OFFICE O/P EST HI 40 MIN: CPT | Mod: PBBFAC,PO | Performed by: SURGERY

## 2020-01-10 PROCEDURE — 99999 PR PBB SHADOW E&M-EST. PATIENT-LVL V: ICD-10-PCS | Mod: PBBFAC,,, | Performed by: SURGERY

## 2020-01-10 PROCEDURE — 99213 OFFICE O/P EST LOW 20 MIN: CPT | Mod: S$PBB,,, | Performed by: SURGERY

## 2020-01-10 PROCEDURE — 1126F AMNT PAIN NOTED NONE PRSNT: CPT | Mod: ,,, | Performed by: SURGERY

## 2020-01-10 PROCEDURE — 1159F MED LIST DOCD IN RCRD: CPT | Mod: ,,, | Performed by: SURGERY

## 2020-01-10 PROCEDURE — 99213 PR OFFICE/OUTPT VISIT, EST, LEVL III, 20-29 MIN: ICD-10-PCS | Mod: S$PBB,,, | Performed by: SURGERY

## 2020-01-10 PROCEDURE — 1126F PR PAIN SEVERITY QUANTIFIED, NO PAIN PRESENT: ICD-10-PCS | Mod: ,,, | Performed by: SURGERY

## 2020-01-10 PROCEDURE — 99999 PR PBB SHADOW E&M-EST. PATIENT-LVL V: CPT | Mod: PBBFAC,,, | Performed by: SURGERY

## 2020-01-10 PROCEDURE — 1159F PR MEDICATION LIST DOCUMENTED IN MEDICAL RECORD: ICD-10-PCS | Mod: ,,, | Performed by: SURGERY

## 2020-01-10 NOTE — PROGRESS NOTES
Medically Supervised Weight Loss Documentation    Date of Visit: 01/14/2020    Patient: Nancy Figueroa    Current Weight: 256  Current BMI: Body mass index is 44.71 kg/m².  Weight Change:  - 6 pounds    Last Weight: 253    Beginning Weight: 262      Vitals:   Vitals:    01/10/20 1037   BP: 134/72   Pulse: 73   Temp: 97.2 °F (36.2 °C)       Comorbidities:   Past Medical History:   Diagnosis Date    Atherosclerotic heart disease of native coronary artery without angina pectoris 5/1/2017 4/17  Left main: normal  LAD: two diagonals  Less than 50%.  90% mid LAD Circumflex:  minimal disease, less than 20%. --  RCA: The vessel was large sized (dominant) with less than 20% stenosis.    Cataract     OU    Essential hypertension 10/18/2012    GERD (gastroesophageal reflux disease)     HTN (hypertension)     Hypothyroidism     NSTEMI (non-ST elevated myocardial infarction) 4/24/2017 4/2017    Stented coronary artery 7/26/2017 4/17 LAD-Synergy 3.0 x 28        Medications:  Current Outpatient Medications on File Prior to Visit   Medication Sig Dispense Refill    amLODIPine (NORVASC) 2.5 MG tablet TAKE 1 TABLET(2.5 MG) BY MOUTH EVERY DAY 90 tablet 4    aspirin (ECOTRIN) 81 MG EC tablet Take 81 mg by mouth once daily. Pt took 5 or 6 before coming to ED.      atorvastatin (LIPITOR) 10 MG tablet Take 1 tablet (10 mg total) by mouth once daily. 90 tablet 4    carvedilol (COREG) 6.25 MG tablet Take 1 tablet (6.25 mg total) by mouth 2 (two) times daily. 180 tablet 4    levothyroxine (SYNTHROID) 112 MCG tablet TAKE 1 TABLET BY MOUTH BEFORE BREAKFAST 30 tablet 0    levothyroxine (SYNTHROID) 112 MCG tablet TAKE 1 TABLET BY MOUTH BEFORE BREAKFAST 90 tablet 1    azelastine (ASTELIN) 137 mcg (0.1 %) nasal spray 1 spray (137 mcg total) by Nasal route 2 (two) times daily. (Patient not taking: Reported on 1/10/2020) 30 mL 11    clopidogrel (PLAVIX) 75 mg tablet Take 1 tablet (75 mg total) by mouth once daily. 90  tablet 4    meclizine (ANTIVERT) 12.5 mg tablet 1 tablet 3-4x daily as needed. (Patient not taking: Reported on 1/10/2020) 30 tablet 1    nitroGLYCERIN (NITROSTAT) 0.4 MG SL tablet Place 1 tablet (0.4 mg total) under the tongue every 5 (five) minutes as needed for Chest pain. (Patient not taking: Reported on 1/10/2020) 30 tablet 12    valsartan-hydrochlorothiazide (DIOVAN-HCT) 320-25 mg per tablet TAKE 1 TABLET BY MOUTH EVERY MORNING 90 tablet 4     No current facility-administered medications on file prior to visit.          Body comp:  Fat Percent:  56.6 %  Fat Mass:  145.2 lb  FFM:  111.2 lb  TBW: 81.6 lb  TBW %:  31.8 %  BMR: 1641 kcal      Diet Education Discussed:    Breakfast:  Protein shake and coffee  Lunch:  sandwich with tortilla  Dinner:  Doing more regular foods no dieting   Snacks on fruit    Exercise/Activity Discussed:    None really    Behavior or Diet Goals for this patient:    Avoid carbs as much as possible  Continue high protein  Start exercising    Labs- reviewed  EKG  UGI - moderate sized hiatal hernia, moderate reflux   dietary consult- done  psych consult - Cleared  Seminar     I will obtain the following clearances prior to surgery: Cardiology - Dr. Gary- send clearance request      : I met with the patient for 15 minutes and counseled her for over 50% of that time

## 2020-01-10 NOTE — PATIENT INSTRUCTIONS
Pre op Diet    Breakfast- protein shake  Lunch- protein shake  Dinner- 3 ounces of meat and vegetables ( from list)    NO SNACKING  Only water to drink

## 2020-01-14 RX ORDER — FAMOTIDINE 10 MG/ML
20 INJECTION INTRAVENOUS
Status: CANCELLED | OUTPATIENT
Start: 2020-01-14

## 2020-01-14 RX ORDER — HEPARIN SODIUM 5000 [USP'U]/ML
5000 INJECTION, SOLUTION INTRAVENOUS; SUBCUTANEOUS
Status: CANCELLED | OUTPATIENT
Start: 2020-01-14

## 2020-01-14 NOTE — PROGRESS NOTES
Follow up    SUBJECTIVE:     Chief Complaint   Patient presents with    Follow-up     1 month f/u       History of Present Illness:    Patient is a 69 y.o. female who is referred for evaluation of surgical treatment of morbid obesity. Her Body mass index is 44.71 kg/m². She has known comorbidities of coronary artery disease, dyslipidemia and hypertension. She has attended the bariatric seminar and is most interested in gastric sleeve surgery.    Review of patient's allergies indicates:  No Known Allergies    Current Outpatient Medications   Medication Sig Dispense Refill    amLODIPine (NORVASC) 2.5 MG tablet TAKE 1 TABLET(2.5 MG) BY MOUTH EVERY DAY 90 tablet 4    aspirin (ECOTRIN) 81 MG EC tablet Take 81 mg by mouth once daily. Pt took 5 or 6 before coming to ED.      atorvastatin (LIPITOR) 10 MG tablet Take 1 tablet (10 mg total) by mouth once daily. 90 tablet 4    carvedilol (COREG) 6.25 MG tablet Take 1 tablet (6.25 mg total) by mouth 2 (two) times daily. 180 tablet 4    levothyroxine (SYNTHROID) 112 MCG tablet TAKE 1 TABLET BY MOUTH BEFORE BREAKFAST 30 tablet 0    levothyroxine (SYNTHROID) 112 MCG tablet TAKE 1 TABLET BY MOUTH BEFORE BREAKFAST 90 tablet 1    azelastine (ASTELIN) 137 mcg (0.1 %) nasal spray 1 spray (137 mcg total) by Nasal route 2 (two) times daily. (Patient not taking: Reported on 1/10/2020) 30 mL 11    clopidogrel (PLAVIX) 75 mg tablet Take 1 tablet (75 mg total) by mouth once daily. 90 tablet 4    meclizine (ANTIVERT) 12.5 mg tablet 1 tablet 3-4x daily as needed. (Patient not taking: Reported on 1/10/2020) 30 tablet 1    nitroGLYCERIN (NITROSTAT) 0.4 MG SL tablet Place 1 tablet (0.4 mg total) under the tongue every 5 (five) minutes as needed for Chest pain. (Patient not taking: Reported on 1/10/2020) 30 tablet 12    valsartan-hydrochlorothiazide (DIOVAN-HCT) 320-25 mg per tablet TAKE 1 TABLET BY MOUTH EVERY MORNING 90 tablet 4     No current facility-administered medications for  this visit.        Past Medical History:   Diagnosis Date    Atherosclerotic heart disease of native coronary artery without angina pectoris 5/1/2017 4/17  Left main: normal  LAD: two diagonals  Less than 50%.  90% mid LAD Circumflex:  minimal disease, less than 20%. --  RCA: The vessel was large sized (dominant) with less than 20% stenosis.    Cataract     OU    Essential hypertension 10/18/2012    GERD (gastroesophageal reflux disease)     HTN (hypertension)     Hypothyroidism     NSTEMI (non-ST elevated myocardial infarction) 4/24/2017 4/2017    Stented coronary artery 7/26/2017 4/17 LAD-Synergy 3.0 x 28      Past Surgical History:   Procedure Laterality Date    APPENDECTOMY      COLONOSCOPY      HYSTERECTOMY      YANG with BSO     Family History   Problem Relation Age of Onset    Cancer Mother 80        breast cancer    Dementia Mother     Cataracts Mother     Breast cancer Mother 70    Cancer Father         lung cancer    Cataracts Father     Cancer Paternal Grandmother         colon    Obesity Sister     Obesity Sister     Obesity Sister      Social History     Tobacco Use    Smoking status: Never Smoker    Smokeless tobacco: Never Used   Substance Use Topics    Alcohol use: No    Drug use: No        Review of Systems:  Review of Systems   Constitutional: Positive for fever. Negative for chills and diaphoresis.   HENT: Positive for trouble swallowing. Negative for congestion, sinus pressure, sneezing, sore throat, tinnitus and voice change.    Eyes: Negative for pain, redness and itching.   Respiratory: Positive for shortness of breath. Negative for apnea, cough, choking, chest tightness, wheezing and stridor.    Cardiovascular: Negative for chest pain, palpitations and leg swelling.   Gastrointestinal: Negative for abdominal pain, anal bleeding, constipation, diarrhea, nausea and vomiting.   Endocrine: Positive for cold intolerance. Negative for heat intolerance.  "  Genitourinary: Positive for frequency and urgency. Negative for difficulty urinating and dysuria.   Musculoskeletal: Positive for back pain and neck pain. Negative for arthralgias, gait problem and neck stiffness.   Skin: Negative for rash and wound.   Allergic/Immunologic: Negative for environmental allergies and food allergies.   Neurological: Positive for dizziness. Negative for light-headedness and headaches.   Hematological: Negative for adenopathy. Bruises/bleeds easily.   Psychiatric/Behavioral: Negative for agitation and confusion.       OBJECTIVE:     Vital Signs (Most Recent)  Temp: 97.2 °F (36.2 °C) (01/10/20 1037)  Pulse: 73 (01/10/20 1037)  BP: 134/72 (01/10/20 1037)  5' 3.5" (1.613 m)  116.3 kg (256 lb 6.4 oz)           Physical Exam:  Physical Exam   Constitutional: She is oriented to person, place, and time. She appears well-developed and well-nourished. No distress.   HENT:   Head: Normocephalic and atraumatic.   Mouth/Throat: No oropharyngeal exudate.   Eyes: Pupils are equal, round, and reactive to light. Conjunctivae and EOM are normal. No scleral icterus.   Neck: Normal range of motion. Neck supple. No JVD present. No tracheal deviation present. No thyromegaly present.   Cardiovascular: Normal rate, regular rhythm and normal heart sounds. Exam reveals no gallop and no friction rub.   No murmur heard.  Pulmonary/Chest: Effort normal and breath sounds normal. No stridor. No respiratory distress. She has no wheezes. She has no rales. She exhibits no tenderness.   Abdominal: Soft. Bowel sounds are normal. She exhibits no distension and no mass. There is no tenderness. There is no rebound and no guarding.   Musculoskeletal: Normal range of motion. She exhibits no edema or tenderness.   Lymphadenopathy:     She has no cervical adenopathy.   Neurological: She is alert and oriented to person, place, and time. No cranial nerve deficit.   Skin: Skin is warm and dry. No rash noted. She is not " diaphoretic. No erythema.   Psychiatric: She has a normal mood and affect. Her behavior is normal.   Nursing note and vitals reviewed.      ASSESSMENT/PLAN:        Labs- reviewed  EKG  UGI - moderate sized hiatal hernia, moderate reflux   dietary consult- done  psych consult - Cleared  Seminar     I will obtain the following clearances prior to surgery: Cardiology - Dr. Gary- send clearance request    1. Morbid obesity     2. Morbid obesity with BMI of 40.0-44.9, adult     3. Essential hypertension     4. Dyslipidemia     5. Atherosclerosis of native coronary artery of native heart without angina pectoris         Plan:  Nancy Figueroa has morbid obesity as their Body mass index is 44.71 kg/m². She would benefit from weight loss surgery and has chosen gastric sleeve surgery as the preferred procedure. She understands that this is a tool and lifestyle change will be necessary to keep weight off. I went over possible complications of the chosen surgery with the patient and she is agreeable to surgery.    She has been instructed to start the pre operative diet.    She surgical date is Planning surgery January 27, at [facility] along with a hiatal hernia repair.      The patient has been taught the post operative diet and understands that she will need to stay on this diet to prevent complication.  They are aware of the post operative follow up and return to see me after surgery to treat/prevent/identify any complications.  she has been advised to attend support groups post operatively.

## 2020-01-24 ENCOUNTER — CLINICAL SUPPORT (OUTPATIENT)
Dept: SURGERY | Facility: CLINIC | Age: 70
End: 2020-01-24
Payer: MEDICARE

## 2020-01-24 VITALS — BODY MASS INDEX: 42.4 KG/M2 | RESPIRATION RATE: 16 BRPM | HEIGHT: 64 IN | WEIGHT: 248.38 LBS

## 2020-01-24 PROCEDURE — 99213 OFFICE O/P EST LOW 20 MIN: CPT | Mod: PBBFAC,PO

## 2020-01-24 PROCEDURE — 99999 PR PBB SHADOW E&M-EST. PATIENT-LVL III: CPT | Mod: PBBFAC,,,

## 2020-01-24 PROCEDURE — 99999 PR PBB SHADOW E&M-EST. PATIENT-LVL III: ICD-10-PCS | Mod: PBBFAC,,,

## 2020-01-30 ENCOUNTER — PATIENT MESSAGE (OUTPATIENT)
Dept: BARIATRICS | Facility: CLINIC | Age: 70
End: 2020-01-30

## 2020-02-04 DIAGNOSIS — I10 ESSENTIAL HYPERTENSION: Primary | ICD-10-CM

## 2020-02-04 RX ORDER — VALSARTAN AND HYDROCHLOROTHIAZIDE 320; 25 MG/1; MG/1
1 TABLET, FILM COATED ORAL DAILY
Qty: 90 TABLET | Refills: 4 | Status: SHIPPED | OUTPATIENT
Start: 2020-02-04 | End: 2020-03-18

## 2020-02-05 ENCOUNTER — TELEPHONE (OUTPATIENT)
Dept: CARDIOLOGY | Facility: CLINIC | Age: 70
End: 2020-02-05

## 2020-02-05 ENCOUNTER — PATIENT MESSAGE (OUTPATIENT)
Dept: BARIATRICS | Facility: CLINIC | Age: 70
End: 2020-02-05

## 2020-02-07 ENCOUNTER — PATIENT MESSAGE (OUTPATIENT)
Dept: BARIATRICS | Facility: CLINIC | Age: 70
End: 2020-02-07

## 2020-02-14 ENCOUNTER — PATIENT MESSAGE (OUTPATIENT)
Dept: BARIATRICS | Facility: CLINIC | Age: 70
End: 2020-02-14

## 2020-02-14 ENCOUNTER — OFFICE VISIT (OUTPATIENT)
Dept: BARIATRICS | Facility: CLINIC | Age: 70
End: 2020-02-14
Payer: MEDICARE

## 2020-02-14 ENCOUNTER — TELEPHONE (OUTPATIENT)
Dept: HEMATOLOGY/ONCOLOGY | Facility: CLINIC | Age: 70
End: 2020-02-14

## 2020-02-14 VITALS
BODY MASS INDEX: 40.97 KG/M2 | HEART RATE: 76 BPM | RESPIRATION RATE: 16 BRPM | WEIGHT: 240 LBS | TEMPERATURE: 97 F | SYSTOLIC BLOOD PRESSURE: 137 MMHG | HEIGHT: 64 IN | DIASTOLIC BLOOD PRESSURE: 84 MMHG

## 2020-02-14 DIAGNOSIS — E66.01 MORBID OBESITY: Primary | ICD-10-CM

## 2020-02-14 DIAGNOSIS — I10 ESSENTIAL HYPERTENSION: ICD-10-CM

## 2020-02-14 DIAGNOSIS — E78.5 DYSLIPIDEMIA: ICD-10-CM

## 2020-02-14 DIAGNOSIS — D21.4 GIST (GASTROINTESTINAL STROMAL TUMOR), NON-MALIGNANT: Primary | ICD-10-CM

## 2020-02-14 PROCEDURE — 99214 OFFICE O/P EST MOD 30 MIN: CPT | Mod: PBBFAC,PO | Performed by: SURGERY

## 2020-02-14 PROCEDURE — 99999 PR PBB SHADOW E&M-EST. PATIENT-LVL IV: CPT | Mod: PBBFAC,,, | Performed by: SURGERY

## 2020-02-14 PROCEDURE — 99024 POSTOP FOLLOW-UP VISIT: CPT | Mod: POP,,, | Performed by: SURGERY

## 2020-02-14 PROCEDURE — 99999 PR PBB SHADOW E&M-EST. PATIENT-LVL IV: ICD-10-PCS | Mod: PBBFAC,,, | Performed by: SURGERY

## 2020-02-14 PROCEDURE — 99024 PR POST-OP FOLLOW-UP VISIT: ICD-10-PCS | Mod: POP,,, | Performed by: SURGERY

## 2020-02-14 RX ORDER — AMOXICILLIN AND CLAVULANATE POTASSIUM 875; 125 MG/1; MG/1
1 TABLET, FILM COATED ORAL 2 TIMES DAILY
Qty: 14 TABLET | Refills: 0 | Status: SHIPPED | OUTPATIENT
Start: 2020-02-14 | End: 2020-02-21

## 2020-02-14 NOTE — PROGRESS NOTES
Post Op Note    Surgery: gastric sleeve surgery  Date: 1/27/20  Initial weight: 262  Last weight: 256  Current weight: 240    Constipation: none  Reflux: none  Vomiting: none    Diet:    2-3 protein shakes per day  Hardest is water    Exercise:  Walking some    MVI: none     Vitals:    02/14/20 0918   BP: 137/84   Pulse: 76   Resp: 16   Temp: 97.3 °F (36.3 °C)       Body comp:  Fat Percent:  55.1 %  Fat Mass:  132.2 lb  FFM:  107.8 lb  TBW: 78.4 lb  TBW %:  32.7 %  BMR: 1578 kcal    PE:  NAD  RRR  Soft/nt/nd  Incisions: left lower incision with some redness    Labs: none     A/P: s/p sleeve      Counseling for patient:    Diet: continue protocol  Exercise: ok for cardio, no heavy lifting over 30 pounds for another month  Vitamins: 2 mvi daily, calcium, and b complex    Co morbidities:     1. Morbid obesity     2. Essential hypertension     3. Dyslipidemia         : I met with the patient for 15 minutes and counseled her for over 50% of that time

## 2020-02-14 NOTE — PATIENT INSTRUCTIONS
Diet: continue protocol  Exercise: ok for cardio, no heavy lifting over 30 pounds for another month  Vitamins: 2 mvi daily, calcium, and b complex

## 2020-02-24 ENCOUNTER — OFFICE VISIT (OUTPATIENT)
Dept: HEMATOLOGY/ONCOLOGY | Facility: CLINIC | Age: 70
End: 2020-02-24
Payer: MEDICARE

## 2020-02-24 VITALS
HEART RATE: 77 BPM | RESPIRATION RATE: 16 BRPM | WEIGHT: 241.88 LBS | OXYGEN SATURATION: 98 % | HEIGHT: 64 IN | BODY MASS INDEX: 41.29 KG/M2 | TEMPERATURE: 98 F

## 2020-02-24 DIAGNOSIS — D21.4 GIST (GASTROINTESTINAL STROMAL TUMOR), NON-MALIGNANT: ICD-10-CM

## 2020-02-24 DIAGNOSIS — C49.A2 GASTROINTESTINAL STROMAL TUMOR (GIST) OF STOMACH: ICD-10-CM

## 2020-02-24 DIAGNOSIS — D49.89 NEOPLASM OF ABDOMEN: ICD-10-CM

## 2020-02-24 PROCEDURE — 99999 PR PBB SHADOW E&M-EST. PATIENT-LVL V: CPT | Mod: PBBFAC,,, | Performed by: INTERNAL MEDICINE

## 2020-02-24 PROCEDURE — 99204 PR OFFICE/OUTPT VISIT, NEW, LEVL IV, 45-59 MIN: ICD-10-PCS | Mod: S$PBB,,, | Performed by: INTERNAL MEDICINE

## 2020-02-24 PROCEDURE — 99999 PR PBB SHADOW E&M-EST. PATIENT-LVL V: ICD-10-PCS | Mod: PBBFAC,,, | Performed by: INTERNAL MEDICINE

## 2020-02-24 PROCEDURE — 99215 OFFICE O/P EST HI 40 MIN: CPT | Mod: PBBFAC,PN | Performed by: INTERNAL MEDICINE

## 2020-02-24 PROCEDURE — 99204 OFFICE O/P NEW MOD 45 MIN: CPT | Mod: S$PBB,,, | Performed by: INTERNAL MEDICINE

## 2020-02-24 NOTE — PROGRESS NOTES
Subjective:         Patient ID: Nancy Figueroa is a 69 y.o. female.    Chief Complaint: GIST    69-year-old female who underwent a gastric sleeve and was incidentally found to have a gastrointestinal stromal tumor.  The tumor was small approximately 0.4 cm and had a very low mitotic rate.  No lymph nodes were removed and the specimen in the was no obvious evidence of metastatic disease.  She presents to us for evaluation of possible adjuvant therapy.    Overall the patient has healed well from surgery.  She denies abdominal pain or early satiety.  She denies any weight loss.  She denies nausea, vomiting, diarrhea or constipation.  She denies any CNS type symptoms including headache, vision changes or gait instability.  This was an elective surgery and the mass was found incidentally.  She was not having symptoms prior to the surgery.  She denies chest pain or shortness of breath.    Review of Systems   Constitutional: Negative for activity change, appetite change, chills, diaphoresis, fatigue, fever and unexpected weight change.   HENT: Negative for facial swelling, mouth sores and sore throat.    Respiratory: Negative for apnea, cough, choking and shortness of breath.    Cardiovascular: Negative for chest pain and leg swelling.   Gastrointestinal: Negative for abdominal distention, abdominal pain, blood in stool, constipation, nausea, rectal pain and vomiting.   Endocrine: Negative for cold intolerance.   Genitourinary: Negative for difficulty urinating, hematuria, vaginal bleeding and vaginal discharge.   Musculoskeletal: Negative for back pain, joint swelling and neck pain.   Skin: Negative for color change, rash and wound.   Neurological: Negative for dizziness, weakness, numbness and headaches.   Hematological: Negative for adenopathy. Does not bruise/bleed easily.   Psychiatric/Behavioral: Negative for agitation, confusion, decreased concentration and hallucinations. The patient is not hyperactive.           Past Medical History:   Past Medical History:   Diagnosis Date    Atherosclerotic heart disease of native coronary artery without angina pectoris 5/1/2017 4/17  Left main: normal  LAD: two diagonals  Less than 50%.  90% mid LAD Circumflex:  minimal disease, less than 20%. --  RCA: The vessel was large sized (dominant) with less than 20% stenosis.    Cataract     OU    Essential hypertension 10/18/2012    GERD (gastroesophageal reflux disease)     HTN (hypertension)     Hypothyroidism     NSTEMI (non-ST elevated myocardial infarction) 4/24/2017 4/2017    Stented coronary artery 7/26/2017 4/17 LAD-Synergy 3.0 x 28         Past Surgical History:   Past Surgical History:   Procedure Laterality Date    APPENDECTOMY      CARDIAC CATHETERIZATION  2017    LAD stent     COLONOSCOPY      HYSTERECTOMY      YANG with BSO    LAPAROSCOPIC SLEEVE GASTRECTOMY N/A 1/27/2020    Procedure: GASTRECTOMY, SLEEVE, LAPAROSCOPIC;  Surgeon: Cirilo Mathias MD;  Location[facility];  Service: General;  Laterality: N/A;        Family History:   Family History   Problem Relation Age of Onset    Cancer Mother 80        breast cancer    Dementia Mother     Cataracts Mother     Breast cancer Mother 70    Cancer Father         lung cancer    Cataracts Father     Cancer Paternal Grandmother         colon    Obesity Sister     Obesity Sister     Obesity Sister         Social History:   Social History     Tobacco Use    Smoking status: Never Smoker    Smokeless tobacco: Never Used   Substance Use Topics    Alcohol use: No        Allergies:   Review of patient's allergies indicates:  No Known Allergies     Medications:     Current Outpatient Medications:     amLODIPine (NORVASC) 2.5 MG tablet, TAKE 1 TABLET(2.5 MG) BY MOUTH EVERY DAY (Patient taking differently: Take 2.5 mg by mouth nightly. TAKE PM BEFORE SURGERY), Disp: 90 tablet, Rfl: 4    aspirin (ECOTRIN) 81 MG EC tablet, Take 81 mg by mouth every evening.  HOLD FOR 1 WEEK PRIOR TO SURGERY, Disp: , Rfl:     atorvastatin (LIPITOR) 10 MG tablet, Take 1 tablet (10 mg total) by mouth once daily. (Patient taking differently: Take 10 mg by mouth every evening. USE PM BEFORE AND A DOSE AM OF SURGERY), Disp: 90 tablet, Rfl: 4    carvedilol (COREG) 6.25 MG tablet, Take 1 tablet (6.25 mg total) by mouth 2 (two) times daily. (Patient taking differently: Take 6.25 mg by mouth 2 (two) times daily. USE PM BEFORE AND WITH A SIP OF WATER AM OF SURGERY), Disp: 180 tablet, Rfl: 4    clopidogrel (PLAVIX) 75 mg tablet, Take 1 tablet (75 mg total) by mouth once daily. (Patient taking differently: Take 75 mg by mouth every evening. WILL HOLD PER MD INSTRUCTIONS.), Disp: 90 tablet, Rfl: 4    diazePAM (VALIUM) 2 MG tablet, Take 1 tablet (2 mg total) by mouth every 6 (six) hours as needed for Anxiety (muscle spasm). (Patient not taking: Reported on 2/14/2020), Disp: 20 tablet, Rfl: 0    HYDROcodone-acetaminophen (NORCO) 7.5-325 mg per tablet, Take 1 tablet by mouth every 4 (four) hours as needed for Pain. (Patient not taking: Reported on 2/14/2020), Disp: 40 tablet, Rfl: 0    levothyroxine (SYNTHROID) 112 MCG tablet, TAKE 1 TABLET BY MOUTH BEFORE BREAKFAST, Disp: 30 tablet, Rfl: 0    levothyroxine (SYNTHROID) 112 MCG tablet, TAKE 1 TABLET BY MOUTH BEFORE BREAKFAST (Patient taking differently: Take 112 mcg by mouth before breakfast. TAKE WITH A SIP OF WATER AM OF SURGERY), Disp: 90 tablet, Rfl: 1    meclizine (ANTIVERT) 12.5 mg tablet, 1 tablet 3-4x daily as needed. (Patient not taking: Reported on 2/14/2020), Disp: 30 tablet, Rfl: 1    nitroGLYCERIN (NITROSTAT) 0.4 MG SL tablet, Place 1 tablet (0.4 mg total) under the tongue every 5 (five) minutes as needed for Chest pain. (Patient not taking: Reported on 2/14/2020), Disp: 30 tablet, Rfl: 12    omeprazole (PRILOSEC) 20 MG capsule, Take 1 capsule (20 mg total) by mouth once daily. (Patient not taking: Reported on 2/14/2020), Disp: 30  capsule, Rfl: 3    ondansetron (ZOFRAN-ODT) 4 MG TbDL, Take 1 tablet (4 mg total) by mouth every 6 (six) hours as needed. (Patient not taking: Reported on 2/14/2020), Disp: 30 tablet, Rfl: 0    valsartan-hydrochlorothiazide (DIOVAN-HCT) 320-25 mg per tablet, Take 1 tablet by mouth once daily. (Patient not taking: Reported on 2/14/2020), Disp: 90 tablet, Rfl: 4     Objective:      Physical Exam   Constitutional: She is oriented to person, place, and time. She appears well-developed and well-nourished. No distress.   HENT:   Head: Normocephalic and atraumatic.   Eyes: Pupils are equal, round, and reactive to light. EOM are normal.   Neck: Normal range of motion. No tracheal deviation present.   Cardiovascular: Normal rate, regular rhythm and normal heart sounds.   No murmur heard.  Pulmonary/Chest: Effort normal and breath sounds normal. No stridor. No respiratory distress. She has no wheezes. She has no rales.   Abdominal: Soft. Bowel sounds are normal. She exhibits no distension and no mass. There is no tenderness. There is no rebound and no guarding.   The surgical wounds are mostly healed well.  There is 1 small area of erythema and the patient was placed on antibiotics by her surgeon.   Musculoskeletal: Normal range of motion. She exhibits no edema or deformity.   Lymphadenopathy:     She has no cervical adenopathy.   Neurological: She is alert and oriented to person, place, and time. No cranial nerve deficit. Coordination normal.   Skin: No rash noted. She is not diaphoretic. No erythema. No pallor.   Psychiatric: She has a normal mood and affect. Her behavior is normal. Judgment and thought content normal.   Vitals reviewed.      Vitals:    02/24/20 1357   Pulse: 77   Resp: 16   Temp: 97.7 °F (36.5 °C)       Assessment:       1. GIST (gastrointestinal stromal tumor), non-malignant    2. Gastrointestinal stromal tumor (GIST) of stomach          Plan:     69-year-old with a newly diagnosed gastrointestinal  stromal tumor status post resection.    Plan:  1.  Chest:  Fortunately the patient's risk from this is small as this was a very small tumor around 0.4 cm and had essentially a 0 mitotic rate.  I would like to obtain some baseline CT scans to be sure she does not have occult metastatic disease and if these are negative we can place her on observation.  She has a trip coming up [place] and will be gone until after March 11th and therefore we will obtain a scan shortly thereafter and I will see her back after the scans are obtained to give her the results.  If the scans are normal will place her on observation.  I have encouraged her to follow with her other physicians as scheduled.  She is aware to contact us with any problems with certainly be glad to see her sooner.

## 2020-02-24 NOTE — LETTER
February 24, 2020      Cirilo Mathias MD  [address] [facility] [address]  [place] [address]           [facility] [address], [facility] [address]  [facility] [address]  Phone: [phone]  Fax: [phone]          Patient: Nancy Figueroa   MR Number: [id number]   YOB: 1950   Date of Visit: 2/24/2020       Dear Dr. Cirilo Mathias:    Thank you for referring Nancy Figueroa to me for evaluation. Attached you will find relevant portions of my assessment and plan of care.    If you have questions, please do not hesitate to call me. I look forward to following Nancy Figueroa along with you.    Sincerely,    DORIAN Waite MD    Enclosure  CC:  No Recipients    If you would like to receive this communication electronically, please contact [email or web address] or [phone] to request more information on [facility] [employer] access.    For providers and/or their staff who would like to refer a patient to [facility], please contact us through our one-stop-shop provider referral line, [facility], at [phone].    If you feel you have received this communication in error or would no longer like to receive these types of communications, please e-mail [email or web address]

## 2020-03-12 DIAGNOSIS — D49.89 NEOPLASM OF ABDOMEN: Primary | ICD-10-CM

## 2020-03-13 ENCOUNTER — HOSPITAL ENCOUNTER (OUTPATIENT)
Dept: RADIOLOGY | Facility: HOSPITAL | Age: 70
Discharge: HOME | End: 2020-03-13
Attending: INTERNAL MEDICINE
Payer: MEDICARE

## 2020-03-13 DIAGNOSIS — D49.89 NEOPLASM OF ABDOMEN: ICD-10-CM

## 2020-03-13 PROCEDURE — 74177 CT ABD & PELVIS W/CONTRAST: CPT | Mod: 26,,, | Performed by: RADIOLOGY

## 2020-03-13 PROCEDURE — 25500020 PHARM REV CODE 255: Mod: PO | Performed by: INTERNAL MEDICINE

## 2020-03-13 PROCEDURE — 74177 CT ABD & PELVIS W/CONTRAST: CPT | Mod: TC,PO

## 2020-03-13 PROCEDURE — 74177 CT CHEST ABDOMEN PELVIS WITH CONTRAST (XPD): ICD-10-PCS | Mod: 26,,, | Performed by: RADIOLOGY

## 2020-03-13 PROCEDURE — 71260 CT THORAX DX C+: CPT | Mod: 26,,, | Performed by: RADIOLOGY

## 2020-03-13 PROCEDURE — 71260 CT CHEST ABDOMEN PELVIS WITH CONTRAST (XPD): ICD-10-PCS | Mod: 26,,, | Performed by: RADIOLOGY

## 2020-03-13 RX ADMIN — IOHEXOL 100 ML: 350 INJECTION, SOLUTION INTRAVENOUS at 08:03

## 2020-03-13 RX ADMIN — IOHEXOL 1000 ML: 12 SOLUTION ORAL at 08:03

## 2020-03-16 ENCOUNTER — PATIENT MESSAGE (OUTPATIENT)
Dept: HEMATOLOGY/ONCOLOGY | Facility: CLINIC | Age: 70
End: 2020-03-16

## 2020-03-17 NOTE — PROGRESS NOTES
I have spoken with the patient today regarding her recent labs and CT scans.  There was incidental pulmonary nodule noted and therefore we will need to repeat her scans in about 3-4 months otherwise her scans do not show any evidence of recurrence or metastatic disease.  We will continue observation for now.  She is aware to contact us with any problems will certainly be glad to see her sooner and I have encouraged her to follow with her other physicians as scheduled.  We did talk about the calcifications noted in her arteries and she is going to discuss this with her cardiologist the patient is aware this plan and is in agreement.  Orders have been placed for CBC, CMP and CT scan to take place in late June.

## 2020-03-18 ENCOUNTER — OFFICE VISIT (OUTPATIENT)
Dept: FAMILY MEDICINE | Facility: CLINIC | Age: 70
End: 2020-03-18
Payer: MEDICARE

## 2020-03-18 VITALS
HEIGHT: 64 IN | HEART RATE: 85 BPM | BODY MASS INDEX: 40.31 KG/M2 | OXYGEN SATURATION: 95 % | WEIGHT: 236.13 LBS | TEMPERATURE: 97 F | DIASTOLIC BLOOD PRESSURE: 80 MMHG | SYSTOLIC BLOOD PRESSURE: 120 MMHG

## 2020-03-18 DIAGNOSIS — I25.10 ATHEROSCLEROSIS OF NATIVE CORONARY ARTERY OF NATIVE HEART WITHOUT ANGINA PECTORIS: ICD-10-CM

## 2020-03-18 DIAGNOSIS — B07.8 OTHER VIRAL WARTS: ICD-10-CM

## 2020-03-18 DIAGNOSIS — E03.9 ACQUIRED HYPOTHYROIDISM: ICD-10-CM

## 2020-03-18 DIAGNOSIS — C49.A2 GASTROINTESTINAL STROMAL TUMOR (GIST) OF STOMACH: ICD-10-CM

## 2020-03-18 DIAGNOSIS — E66.01 MORBID OBESITY: ICD-10-CM

## 2020-03-18 DIAGNOSIS — I10 ESSENTIAL HYPERTENSION: Primary | ICD-10-CM

## 2020-03-18 DIAGNOSIS — E78.5 DYSLIPIDEMIA: ICD-10-CM

## 2020-03-18 PROCEDURE — 99214 OFFICE O/P EST MOD 30 MIN: CPT | Mod: S$PBB,,, | Performed by: FAMILY MEDICINE

## 2020-03-18 PROCEDURE — 99999 PR PBB SHADOW E&M-EST. PATIENT-LVL III: ICD-10-PCS | Mod: PBBFAC,,, | Performed by: FAMILY MEDICINE

## 2020-03-18 PROCEDURE — 99999 PR PBB SHADOW E&M-EST. PATIENT-LVL III: CPT | Mod: PBBFAC,,, | Performed by: FAMILY MEDICINE

## 2020-03-18 PROCEDURE — 99213 OFFICE O/P EST LOW 20 MIN: CPT | Mod: PBBFAC,PO | Performed by: FAMILY MEDICINE

## 2020-03-18 PROCEDURE — 99214 PR OFFICE/OUTPT VISIT, EST, LEVL IV, 30-39 MIN: ICD-10-PCS | Mod: S$PBB,,, | Performed by: FAMILY MEDICINE

## 2020-03-18 NOTE — PROGRESS NOTES
"Subjective:       Patient ID: Nancy Figueroa is a 69 y.o. female.    Chief Complaint: Annual Exam    Pt is known to me.  Pt is here for followup of chronic medical issues.  The pt is now about 2 months s/p gastric sleeve--she has lost 18 pounds.  She is feeling well.  At time of surgery she was found to have a low grade gastric stromal tumor--seeing Dr. Waite.  She has a hard raised lesion at the tip of her left thumb--it hurts to push on it.      Review of Systems   Constitutional: Negative for activity change and unexpected weight change.   HENT: Negative for hearing loss, rhinorrhea and trouble swallowing.    Eyes: Negative for discharge and visual disturbance.   Respiratory: Negative for chest tightness and wheezing.    Cardiovascular: Negative for chest pain and palpitations.   Gastrointestinal: Negative for blood in stool, constipation, diarrhea and vomiting.   Endocrine: Negative for polydipsia and polyuria.   Genitourinary: Negative for difficulty urinating, dysuria, hematuria and menstrual problem.   Musculoskeletal: Negative for arthralgias, joint swelling and neck pain.   Neurological: Negative for weakness and headaches.   Psychiatric/Behavioral: Negative for confusion and dysphoric mood.       Objective:       Vitals:    03/18/20 0829   BP: 120/80   Pulse: 85   Temp: 97.1 °F (36.2 °C)   TempSrc: Oral   SpO2: 95%   Weight: 107.1 kg (236 lb 1.8 oz)   Height: 5' 3.5" (1.613 m)     Physical Exam   Constitutional: She is oriented to person, place, and time. She appears well-developed and well-nourished.   Pt is morbidly obese   HENT:   Head: Normocephalic.   Eyes: Pupils are equal, round, and reactive to light. Conjunctivae and EOM are normal.   Neck: Normal range of motion. Neck supple. No thyromegaly present.   Cardiovascular: Normal rate, regular rhythm and normal heart sounds.   Pulmonary/Chest: Effort normal and breath sounds normal.   Abdominal: Soft. Bowel sounds are normal. There is no " tenderness.   Musculoskeletal: She exhibits no edema.   Neurological: She is alert and oriented to person, place, and time.   Skin: Skin is warm and dry.   Hypertrophic white hard lesion tip of left thumb   Psychiatric: She has a normal mood and affect. Her behavior is normal.       Assessment:       1. Essential hypertension    2. Atherosclerosis of native coronary artery of native heart without angina pectoris    3. Dyslipidemia    4. Gastrointestinal stromal tumor (GIST) of stomach    5. Acquired hypothyroidism    6. Morbid obesity    7. Other viral warts        Plan:       Nancy was seen today for annual exam.    Diagnoses and all orders for this visit:    Essential hypertension    Atherosclerosis of native coronary artery of native heart without angina pectoris    Dyslipidemia    Gastrointestinal stromal tumor (GIST) of stomach    Acquired hypothyroidism  -     TSH; Future  -     T4, free; Future    Morbid obesity    Other viral warts      During this visit, I reviewed the pt's history, medications, allergies, and problem list.    Pt to try OTC wart treatment--if fails, will get dermatology appt

## 2020-03-27 ENCOUNTER — OFFICE VISIT (OUTPATIENT)
Dept: BARIATRICS | Facility: CLINIC | Age: 70
End: 2020-03-27
Payer: MEDICARE

## 2020-03-27 DIAGNOSIS — E66.01 MORBID OBESITY: Primary | ICD-10-CM

## 2020-03-27 DIAGNOSIS — E66.01 MORBID OBESITY WITH BMI OF 40.0-44.9, ADULT: ICD-10-CM

## 2020-03-27 PROCEDURE — 99024 POSTOP FOLLOW-UP VISIT: CPT | Mod: 95,POP,, | Performed by: SURGERY

## 2020-03-27 PROCEDURE — 99024 PR POST-OP FOLLOW-UP VISIT: ICD-10-PCS | Mod: 95,POP,, | Performed by: SURGERY

## 2020-03-29 ENCOUNTER — PATIENT MESSAGE (OUTPATIENT)
Dept: ADMINISTRATIVE | Facility: OTHER | Age: 70
End: 2020-03-29

## 2020-04-07 ENCOUNTER — TELEPHONE (OUTPATIENT)
Dept: BARIATRICS | Facility: CLINIC | Age: 70
End: 2020-04-07

## 2020-04-24 DIAGNOSIS — E03.9 HYPOTHYROIDISM, UNSPECIFIED TYPE: ICD-10-CM

## 2020-04-29 RX ORDER — LEVOTHYROXINE SODIUM 112 UG/1
TABLET ORAL
Qty: 90 TABLET | Refills: 0 | Status: SHIPPED | OUTPATIENT
Start: 2020-04-29 | End: 2020-07-23

## 2020-04-29 NOTE — PROGRESS NOTES
Refill Routing Note    Medication(s) are not appropriate for processing by [facility]:       Patient has been hospitalized since the last PCP visit        Medication Therapy Plan: TSH-wnl: Hospital encounter(1/27/20-Morbid obesity), defer to you  Medication reconciliation completed: No      Automatic Epic Protocol Generated Data:    Requested Prescriptions   Pending Prescriptions Disp Refills    levothyroxine (SYNTHROID) 112 MCG tablet [Pharmacy Med Name: LEVOTHYROXINE 0.112MG (112MCG) TABS] 90 tablet 0     Sig: TAKE 1 TABLET BY MOUTH BEFORE BREAKFAST       Endocrinology:  Hypothyroid Agents Failed - 4/29/2020 10:41 AM        Failed - Manual Review: FT4 is not required if last TSH is WNL.        Passed - Patient is at least 18 years old        Passed - Office visit in past 12 months or future 90 days.     Recent Outpatient Visits            1 month ago Morbid obesity    [facility] Cirilo Mathias MD    1 month ago Essential hypertension    [facility] KAREEM Pantoja MD    2 months ago GIST (gastrointestinal stromal tumor), non-malignant    [facility] DOIRAN Waite MD    2 months ago Morbid obesity    [facility] Cirilo Mathias MD    3 months ago Morbid obesity    [facility] Cirilo Mathias MD          Future Appointments              In 1 week [facility]    In 1 month [facility], [place] [facility] [place]    In 1 month [facility] CT1 LIMIT 500 LBS [facility] [place]    In 2 months DORIAN Waite MD [facility] [place]    In 3 months LAB, [place] [facility] [place]    In 3 months [facility]    In 3 months Zachariah Gary [facility]                 Passed - TSH in normal range and within 360 days     TSH   Date Value Ref Range Status   09/10/2019 1.169 0.400 - 4.000 uIU/mL Final   09/10/2019 1.169 0.400 - 4.000 uIU/mL Final   03/22/2018 1.664 0.400 - 4.000 uIU/mL Final              Passed - T4 free within 1080 days     Free T4   Date Value Ref Range Status   09/10/2019 1.34 0.71 - 1.51 ng/dL Final   04/13/2010 1.23 0.71 - 1.51 ng/dl Final   02/13/2009 1.07 0.71 - 1.51 ng/dl Final                 Appointments  past 12m or future 3m with PCP    Date Provider   Last Visit   3/18/2020 KAREEM Pantoja MD   Next Visit   Visit date not found KAREEM Pantoja MD   ED visits in past 90 days: 0     Note composed:10:44 AM 04/29/2020

## 2020-05-04 ENCOUNTER — PATIENT OUTREACH (OUTPATIENT)
Dept: OTHER | Facility: OTHER | Age: 70
End: 2020-05-04

## 2020-05-04 NOTE — PROGRESS NOTES
Digital Medicine: Health  Introduction    Introduced Nancy Figueroa to Digital Medicine. Discussed health  role and recommended lifestyle modifications.    Patient discussed that her cuff is too large and that she left a message on the care team's voicemail yesterday to request a smaller cuff. We discussed that the incorrect sizing may have effected the accuracy of her blood pressure readings.    Her first reading of 177/86 was taken immediately after she came inside from working outside in the garden. She did not experience symptoms such as chest pain or severe headache.     I'll follow up with her in a couple of weeks to see if she received it her smaller cuff.          Last 5 Patient Entered Readings                                      Current 30 Day Average: 140/83     Recent Readings 5/3/2020 5/3/2020 5/2/2020 5/1/2020 4/30/2020    SBP (mmHg) 144 162 137 150 129    DBP (mmHg) 84 90 85 87 81    Pulse 65 67 85 70 96            INTERVENTION(S)  reviewed monitoring technique    PLAN  patient verbalizes understanding      There are no preventive care reminders to display for this patient.    Reviewed the importance of self-monitoring, medication adherence, and that the health  can be used as a resource for lifestyle modifications to help reduce or maintain a healthy lifestyle.    Sent link to [facility] webpages and my contact information via [employer] for future questions. Follow up scheduled.         Screenings    SDOH

## 2020-05-04 NOTE — LETTER
May 4, 2020     Nancy Figueroa  [address] [facility] [address] [facility] [address]       Dear Mrs. Figueroa,    Welcome to [facility]! Our goal is to make care effective, proactive and convenient by using data you send us from home to better treat your chronic conditions.              My name is Danielle MarmolejoFredRamirez, and I am your dedicated Digital Medicine clinician. As an expert in medication management, I will help ensure that the medications you are taking continue to provide the intended benefits and help you reach your goals. You can reach me directly at [phone] or by sending me a message directly through your [facility] account.      I am Pablo Kay and I will be your health . My job is to help you identify lifestyle changes to improve your disease control. We will talk about nutrition, exercise, and other ways you may be able to adjust your current habits to better your health. Additionally, we will help ensure you are completing the tests and screenings that are necessary to help manage your conditions. You can reach me directly at [phone] or by sending me a message directly through your [facility] account.    Most importantly, YOU are at the center of this team. Together, we will work to improve your overall health and encourage you to meet your goals for a healthier lifestyle.     What we expect from YOU:  · Please take frequent home blood pressure measurements. We ask that you take at least 1 blood pressure reading per week, but more information will better help us get you know you. Be sure you rest for a few minutes before taking the reading in a quiet, comfortable place.     Be available to receive phone calls or [email or web address] messages, when appropriate, from your care team. Please let us know if there are any specific days or times that work best for us to reach you via phone.     Complete routine tests and screenings. Dont worry, we will help keep you on track!            What you should expect from your Digital Medicine Care Team:   We will work with you to create a personalized plan of care and provide you with encouragement and education, including regarding lifestyle changes, that could help you manage your disease states.     We will adjust your current medications, if needed, and continue to monitor your long-term progress.     We will provide you and your physician with monthly progress reports after you have been in the program for more than 30 days.     We will send you reminders through [employer] and text messages to help ensure you do not miss any testing deadlines to help manage your disease states.    You will be able to reach us by phone or through your [email or web address] account by clicking our names under Care Team on the right side of the home screen.    I look forward to working with you to achieve your blood pressure goals!    We look forward to working with you to help manage your health,    Sincerely,    Your Digital Medicine Team    Please visit our websites to learn more:   · Hypertension: [email or web address]      Remember, we are not available for emergencies. If you have an emergency, please contact your doctors office directly or call [facility] on[facility]call ([phone] or [phone]) or [phone].

## 2020-05-05 ENCOUNTER — PATIENT MESSAGE (OUTPATIENT)
Dept: ADMINISTRATIVE | Facility: HOSPITAL | Age: 70
End: 2020-05-05

## 2020-05-06 ENCOUNTER — PATIENT OUTREACH (OUTPATIENT)
Dept: OTHER | Facility: OTHER | Age: 70
End: 2020-05-06

## 2020-05-06 DIAGNOSIS — I10 ESSENTIAL HYPERTENSION: Primary | ICD-10-CM

## 2020-05-08 ENCOUNTER — OFFICE VISIT (OUTPATIENT)
Dept: BARIATRICS | Facility: CLINIC | Age: 70
End: 2020-05-08
Payer: MEDICARE

## 2020-05-08 DIAGNOSIS — E55.9 VITAMIN D DEFICIENCY: ICD-10-CM

## 2020-05-08 DIAGNOSIS — E61.1 IRON DEFICIENCY: ICD-10-CM

## 2020-05-08 DIAGNOSIS — E66.01 MORBID OBESITY: Primary | ICD-10-CM

## 2020-05-08 DIAGNOSIS — E78.5 DYSLIPIDEMIA: ICD-10-CM

## 2020-05-08 DIAGNOSIS — E66.01 MORBID OBESITY WITH BMI OF 40.0-44.9, ADULT: ICD-10-CM

## 2020-05-08 DIAGNOSIS — E53.8 B12 DEFICIENCY: ICD-10-CM

## 2020-05-08 PROCEDURE — 99213 OFFICE O/P EST LOW 20 MIN: CPT | Mod: 95,,, | Performed by: SURGERY

## 2020-05-08 PROCEDURE — 99213 PR OFFICE/OUTPT VISIT, EST, LEVL III, 20-29 MIN: ICD-10-PCS | Mod: 95,,, | Performed by: SURGERY

## 2020-05-08 NOTE — PROGRESS NOTES
The patient location is: home  The chief complaint leading to consultation is: post op  Visit type: audiovisual  Total time spent with patient: 10 min  Each patient to whom he or she provides medical services by telemedicine is:  (1) informed of the relationship between the physician and patient and the respective role of any other health care provider with respect to management of the patient; and (2) notified that he or she may decline to receive medical services by telemedicine and may withdraw from such care at any time.    Notes:       Post Op Note    Surgery: gastric sleeve surgery  Date: 1/27/20  Initial weight: 262  Last weight: 240  Current weight: 224.8     No complaints    Diet:    Doing mainly low carb- yogurts and protein shakes    Exercise:    Walking around yard daily    MVI: one a day with calcium    Labs: none     A/P: s/p sleeve      Counseling for patient:    Diet: continue low carb diet  Exercise: exercise on routine at least 20 minutes 3 times per week   Vitamins: continue regimen  Co morbidities:     1. Morbid obesity  CBC w/ Auto Differential    CMP    B1    Lipid Panel   2. Morbid obesity with BMI of 40.0-44.9, adult     3. Dyslipidemia     4. B12 deficiency  B12   5. Iron deficiency  Iron Studies   6. Vitamin D deficiency  Vitamin D 25 Hydroxy       -All above: Evaluated, monitored, and treated with diet and exercise program.        : I met with the patient for 15 minutes and counseled her for over 50% of that time

## 2020-05-28 RX ORDER — MULTIVITAMIN
1 TABLET ORAL DAILY
COMMUNITY

## 2020-05-28 RX ORDER — UBIDECARENONE 75 MG
500 CAPSULE ORAL DAILY
COMMUNITY

## 2020-05-28 RX ORDER — PSEUDOEPHED/CODEINE/TRIPROLIDN 30-10-1.25
2 SYRUP ORAL DAILY
COMMUNITY
End: 2023-08-22
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
## 2020-06-25 ENCOUNTER — HOSPITAL ENCOUNTER (OUTPATIENT)
Dept: RADIOLOGY | Facility: HOSPITAL | Age: 70
Discharge: HOME | End: 2020-06-25
Attending: INTERNAL MEDICINE
Payer: MEDICARE

## 2020-06-25 DIAGNOSIS — D49.89 NEOPLASM OF ABDOMEN: ICD-10-CM

## 2020-06-25 PROCEDURE — 25500020 PHARM REV CODE 255: Mod: PO | Performed by: INTERNAL MEDICINE

## 2020-06-25 PROCEDURE — 71260 CT CHEST ABDOMEN PELVIS WITH CONTRAST (XPD): ICD-10-PCS | Mod: 26,,, | Performed by: RADIOLOGY

## 2020-06-25 PROCEDURE — 74177 CT CHEST ABDOMEN PELVIS WITH CONTRAST (XPD): ICD-10-PCS | Mod: 26,,, | Performed by: RADIOLOGY

## 2020-06-25 PROCEDURE — 74177 CT ABD & PELVIS W/CONTRAST: CPT | Mod: 26,,, | Performed by: RADIOLOGY

## 2020-06-25 PROCEDURE — 74177 CT ABD & PELVIS W/CONTRAST: CPT | Mod: TC,PO

## 2020-06-25 PROCEDURE — 71260 CT THORAX DX C+: CPT | Mod: 26,,, | Performed by: RADIOLOGY

## 2020-06-25 RX ADMIN — IOHEXOL 100 ML: 350 INJECTION, SOLUTION INTRAVENOUS at 10:06

## 2020-06-25 RX ADMIN — IOHEXOL 1000 ML: 12 SOLUTION ORAL at 10:06

## 2020-06-29 PROCEDURE — 99454 REM MNTR PHYSIOL PARAM 16-30: CPT | Mod: PBBFAC,PO | Performed by: FAMILY MEDICINE

## 2020-07-06 ENCOUNTER — OFFICE VISIT (OUTPATIENT)
Dept: HEMATOLOGY/ONCOLOGY | Facility: CLINIC | Age: 70
End: 2020-07-06
Payer: MEDICARE

## 2020-07-06 VITALS
SYSTOLIC BLOOD PRESSURE: 140 MMHG | TEMPERATURE: 98 F | BODY MASS INDEX: 38.86 KG/M2 | RESPIRATION RATE: 20 BRPM | OXYGEN SATURATION: 97 % | DIASTOLIC BLOOD PRESSURE: 78 MMHG | HEART RATE: 70 BPM | WEIGHT: 222.88 LBS

## 2020-07-06 DIAGNOSIS — C26.9 MALIGNANT NEOPLASM OF ILL-DEFINED SITES WITHIN THE DIGESTIVE SYSTEM: Primary | ICD-10-CM

## 2020-07-06 DIAGNOSIS — C49.A2 GASTROINTESTINAL STROMAL TUMOR (GIST) OF STOMACH: ICD-10-CM

## 2020-07-06 PROCEDURE — 99999 PR PBB SHADOW E&M-EST. PATIENT-LVL IV: CPT | Mod: PBBFAC,,, | Performed by: INTERNAL MEDICINE

## 2020-07-06 PROCEDURE — 99999 PR PBB SHADOW E&M-EST. PATIENT-LVL IV: ICD-10-PCS | Mod: PBBFAC,,, | Performed by: INTERNAL MEDICINE

## 2020-07-06 PROCEDURE — 99214 OFFICE O/P EST MOD 30 MIN: CPT | Mod: PBBFAC,PN | Performed by: INTERNAL MEDICINE

## 2020-07-06 PROCEDURE — 99214 OFFICE O/P EST MOD 30 MIN: CPT | Mod: S$PBB,,, | Performed by: INTERNAL MEDICINE

## 2020-07-06 PROCEDURE — 99214 PR OFFICE/OUTPT VISIT, EST, LEVL IV, 30-39 MIN: ICD-10-PCS | Mod: S$PBB,,, | Performed by: INTERNAL MEDICINE

## 2020-07-06 NOTE — PROGRESS NOTES
Subjective:         Patient ID: Nancy Figueroa is a 70 y.o. female.    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  The tumor was small approximately 0.4 cm and had a very low mitotic rate.  No lymph nodes were removed and the specimen in the was no obvious evidence of metastatic disease.  She presents to us for evaluation of possible adjuvant therapy.    The patient returns today for follow-up.  Overall she feels well.  Her most recent CT scans and not show evidence of metastatic disease.  She denies chest pain or shortness of breath.  There was a lung nodule that we are observing and she is asymptomatic of this.  She denies any CNS type symptoms or bone pain.  She denies abdominal pain, melena, hematochezia red blood per rectum.    Follow-up  Pertinent negatives include no abdominal pain, chest pain, chills, coughing, diaphoresis, fatigue, fever, headaches, joint swelling, nausea, neck pain, numbness, rash, sore throat, vomiting or weakness.     Review of Systems   Constitutional: Negative for activity change, appetite change, chills, diaphoresis, fatigue, fever and unexpected weight change.   HENT: Negative for facial swelling, mouth sores and sore throat.    Respiratory: Negative for apnea, cough, choking and shortness of breath.    Cardiovascular: Negative for chest pain and leg swelling.   Gastrointestinal: Negative for abdominal distention, abdominal pain, blood in stool, constipation, nausea, rectal pain and vomiting.   Endocrine: Negative for cold intolerance.   Genitourinary: Negative for difficulty urinating, hematuria, vaginal bleeding and vaginal discharge.   Musculoskeletal: Negative for back pain, joint swelling and neck pain.   Skin: Negative for color change, rash and wound.   Neurological: Negative for dizziness, weakness, numbness and headaches.    Hematological: Negative for adenopathy. Does not bruise/bleed easily.   Psychiatric/Behavioral: Negative for agitation, confusion, decreased concentration and hallucinations. The patient is not hyperactive.          Past Medical History:   Past Medical History:   Diagnosis Date    Atherosclerotic heart disease of native coronary artery without angina pectoris 5/1/2017 4/17  Left main: normal  LAD: two diagonals  Less than 50%.  90% mid LAD Circumflex:  minimal disease, less than 20%. --  RCA: The vessel was large sized (dominant) with less than 20% stenosis.    Cataract     OU    Essential hypertension 10/18/2012    GERD (gastroesophageal reflux disease)     HTN (hypertension)     Hypothyroidism     NSTEMI (non-ST elevated myocardial infarction) 4/24/2017 4/2017    Stented coronary artery 7/26/2017 4/17 LAD-Synergy 3.0 x 28         Past Surgical History:   Past Surgical History:   Procedure Laterality Date    APPENDECTOMY      CARDIAC CATHETERIZATION  2017    LAD stent     COLONOSCOPY      HYSTERECTOMY      YANG with BSO    LAPAROSCOPIC SLEEVE GASTRECTOMY N/A 1/27/2020    Procedure: GASTRECTOMY, SLEEVE, LAPAROSCOPIC;  Surgeon: Cirilo Mathias MD;  Location: [facility];  Service: General;  Laterality: N/A;        Family History:   Family History   Problem Relation Age of Onset    Cancer Mother 80        breast cancer    Dementia Mother     Cataracts Mother     Breast cancer Mother 70    Cancer Father         lung cancer    Cataracts Father     Cancer Paternal Grandmother         colon    Obesity Sister     Obesity Sister     Obesity Sister         Social History:   Social History     Tobacco Use    Smoking status: Never Smoker    Smokeless tobacco: Never Used   Substance Use Topics    Alcohol use: No     Frequency: Monthly or less     Drinks per session: 1 or 2     Binge frequency: Never        Allergies:   Review of patient's allergies indicates:  No Known Allergies      Medications:     Current Outpatient Medications:     amLODIPine (NORVASC) 2.5 MG tablet, TAKE 1 TABLET(2.5 MG) BY MOUTH EVERY DAY (Patient taking differently: Take 2.5 mg by mouth nightly. TAKE PM BEFORE SURGERY), Disp: 90 tablet, Rfl: 4    aspirin (ECOTRIN) 81 MG EC tablet, Take 81 mg by mouth every evening. HOLD FOR 1 WEEK PRIOR TO SURGERY, Disp: , Rfl:     atorvastatin (LIPITOR) 10 MG tablet, Take 1 tablet (10 mg total) by mouth once daily. (Patient taking differently: Take 10 mg by mouth every evening. USE PM BEFORE AND A DOSE AM OF SURGERY), Disp: 90 tablet, Rfl: 4    calcium carbonate 650 mg calcium (1,625 mg) tablet, Take 2 tablets by mouth once daily., Disp: , Rfl:     carvedilol (COREG) 6.25 MG tablet, Take 1 tablet (6.25 mg total) by mouth 2 (two) times daily. (Patient taking differently: Take 6.25 mg by mouth 2 (two) times daily. USE PM BEFORE AND WITH A SIP OF WATER AM OF SURGERY), Disp: 180 tablet, Rfl: 4    clopidogrel (PLAVIX) 75 mg tablet, Take 1 tablet (75 mg total) by mouth once daily. (Patient taking differently: Take 75 mg by mouth every evening. WILL HOLD PER MD INSTRUCTIONS.), Disp: 90 tablet, Rfl: 4    cyanocobalamin 500 MCG tablet, Take 500 mcg by mouth once daily., Disp: , Rfl:     levothyroxine (SYNTHROID) 112 MCG tablet, TAKE 1 TABLET BY MOUTH BEFORE BREAKFAST, Disp: 90 tablet, Rfl: 0    meclizine (ANTIVERT) 12.5 mg tablet, 1 tablet 3-4x daily as needed., Disp: 30 tablet, Rfl: 1    multivitamin (THERAGRAN) per tablet, Take 1 tablet by mouth once daily., Disp: , Rfl:      Objective:      Physical Exam  Vitals signs reviewed.   Constitutional:       General: She is not in acute distress.     Appearance: She is well-developed. She is not diaphoretic.   HENT:      Head: Normocephalic and atraumatic.   Eyes:      Pupils: Pupils are equal, round, and reactive to light.   Neck:      Musculoskeletal: Normal range of motion.      Trachea: No tracheal deviation.   Cardiovascular:       Rate and Rhythm: Normal rate and regular rhythm.      Heart sounds: Normal heart sounds. No murmur.   Pulmonary:      Effort: Pulmonary effort is normal. No respiratory distress.      Breath sounds: Normal breath sounds. No stridor. No wheezing or rales.   Abdominal:      General: Bowel sounds are normal. There is no distension.      Palpations: Abdomen is soft. There is no mass.      Tenderness: There is no abdominal tenderness. There is no guarding or rebound.      Comments: The surgical wounds are healed.   Musculoskeletal: Normal range of motion.         General: No deformity.   Lymphadenopathy:      Cervical: No cervical adenopathy.   Skin:     Coloration: Skin is not pale.      Findings: No erythema or rash.   Neurological:      Mental Status: She is alert and oriented to person, place, and time.      Cranial Nerves: No cranial nerve deficit.      Coordination: Coordination normal.   Psychiatric:         Behavior: Behavior normal.         Thought Content: Thought content normal.         Judgment: Judgment normal.         Vitals:    07/06/20 0859   BP: (!) 140/78   Pulse: 70   Resp: 20   Temp: 98.4 °F (36.9 °C)     No visits with results within 1 Week(s) from this visit.   Latest known visit with results is:   Lab Visit on 06/25/2020   Component Date Value Ref Range Status    WBC 06/25/2020 6.52  3.90 - 12.70 K/uL Final    RBC 06/25/2020 4.95  4.00 - 5.40 M/uL Final    Hemoglobin 06/25/2020 14.6  12.0 - 16.0 g/dL Final    Hematocrit 06/25/2020 43.2  37.0 - 48.5 % Final    Mean Corpuscular Volume 06/25/2020 87  82 - 98 fL Final    Mean Corpuscular Hemoglobin 06/25/2020 29.5  27.0 - 31.0 pg Final    Mean Corpuscular Hemoglobin Conc 06/25/2020 33.8  32.0 - 36.0 g/dL Final    RDW 06/25/2020 13.7  11.5 - 14.5 % Final    Platelets 06/25/2020 255  150 - 350 K/uL Final    MPV 06/25/2020 11.3  9.2 - 12.9 fL Final    Gran # (ANC) 06/25/2020 4.1  1.8 - 7.7 K/uL Final    Lymph # 06/25/2020 1.8  1.0 - 4.8 K/uL  Final    Mono # 06/25/2020 0.4  0.3 - 1.0 K/uL Final    Eos # 06/25/2020 0.2  0.0 - 0.5 K/uL Final    Baso # 06/25/2020 0.04  0.00 - 0.20 K/uL Final    Gran% 06/25/2020 62.3  38.0 - 73.0 % Final    Lymph% 06/25/2020 27.3  18.0 - 48.0 % Final    Mono% 06/25/2020 6.7  4.0 - 15.0 % Final    Eosinophil% 06/25/2020 3.1  0.0 - 8.0 % Final    Basophil% 06/25/2020 0.6  0.0 - 1.9 % Final    Differential Method 06/25/2020 Automated   Final    Sodium 06/25/2020 140  136 - 145 mmol/L Final    Potassium 06/25/2020 4.7  3.5 - 5.1 mmol/L Final    Chloride 06/25/2020 109  95 - 110 mmol/L Final    CO2 06/25/2020 18* 23 - 29 mmol/L Final    Glucose 06/25/2020 97  70 - 110 mg/dL Final    BUN, Bld 06/25/2020 14  8 - 23 mg/dL Final    Creatinine 06/25/2020 0.8  0.5 - 1.4 mg/dL Final    Calcium 06/25/2020 9.3  8.7 - 10.5 mg/dL Final    Total Protein 06/25/2020 8.0  6.0 - 8.4 g/dL Final    Albumin 06/25/2020 4.1  3.5 - 5.2 g/dL Final    Total Bilirubin 06/25/2020 1.9* 0.1 - 1.0 mg/dL Final    Comment: For infants and newborns, interpretation of results should be based  on gestational age, weight and in agreement with clinical  observations.  Premature Infant recommended reference ranges:  Up to 24 hours.............<8.0 mg/dL  Up to 48 hours............<12.0 mg/dL  3-5 days..................<15.0 mg/dL  6-29 days.................<15.0 mg/dL      Alkaline Phosphatase 06/25/2020 151* 55 - 135 U/L Final    AST 06/25/2020 22  10 - 40 U/L Final    ALT 06/25/2020 17  10 - 44 U/L Final    Anion Gap 06/25/2020 13  8 - 16 mmol/L Final    eGFR if African American 06/25/2020 >60  >60 mL/min/1.73 m^2 Final    eGFR if non African American 06/25/2020 >60  >60 mL/min/1.73 m^2 Final    Comment: Calculation used to obtain the estimated glomerular filtration  rate (eGFR) is the CKD-EPI equation.          Assessment:       1. 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 Gastrointestinal stromal tumor  (GIST) of stomach          Plan:     69-year-old with a gastrointestinal stromal tumor status post resection.    Plan:  1.  Chest:  Fortunately the patient's risk from this is small as this was a very small tumor around 0.4 cm and had essentially a 0 mitotic rate.  I did review her most recent labs with her as well as her scans for a few months ago.  Tentatively I will see her back in about 3 months with repeat labs and CT scan.  She is aware to contact me with any problems will certainly see her sooner we have encouraged her to follow with her other physicians as scheduled.

## 2020-07-23 ENCOUNTER — PATIENT OUTREACH (OUTPATIENT)
Dept: OTHER | Facility: OTHER | Age: 70
End: 2020-07-23

## 2020-07-23 DIAGNOSIS — E03.9 HYPOTHYROIDISM, UNSPECIFIED TYPE: ICD-10-CM

## 2020-07-23 RX ORDER — LEVOTHYROXINE SODIUM 112 UG/1
TABLET ORAL
Qty: 90 TABLET | Refills: 0 | Status: SHIPPED | OUTPATIENT
Start: 2020-07-23 | End: 2020-10-21

## 2020-07-23 NOTE — TELEPHONE ENCOUNTER
No new care gaps identified.  Powered by [employer]. Reference number: [id number]. 7/23/2020 6:09:40 AM YANT

## 2020-07-23 NOTE — PROGRESS NOTES
Patient is currently at goal, Avg BP is 125/79. Defer call at this time. HC reached out to pt today, unsuccessful. No med changes recommended. Will reach out in 3 weeks, sooner if BP begins to trend upward or downward.    Last 5 Patient Entered Readings                                      Current 30 Day Average: 125/79     Recent Readings 7/14/2020 7/6/2020 7/1/2020 6/26/2020 6/22/2020    SBP (mmHg) 138 129 102 132 138    DBP (mmHg) 88 79 69 81 79    Pulse 70 78 66 70 73

## 2020-07-23 NOTE — PROGRESS NOTES
Refill Authorization Note     is requesting a refill authorization.    Brief assessment and rationale for refill: APPROVE: needs labs     Medication-related problems identified: Requires labs    Medication Therapy Plan: CDMF; ORC reporting submitted. NTBS(TSH); NTBL(CBC/LIPID/CMP/TSH)    Medication reconciliation completed: No                         Comments:   Automatic Epic Protocol Generated Data:    Requested Prescriptions   Signed Prescriptions Disp Refills    levothyroxine (SYNTHROID) 112 MCG tablet 90 tablet 0     Sig: TAKE 1 TABLET BY MOUTH BEFORE BREAKFAST       Endocrinology:  Hypothyroid Agents Failed - 7/23/2020  6:09 AM        Failed - Manual Review: FT4 is not required if last TSH is WNL.        Passed - Patient is at least 18 years old        Passed - Office visit in past 12 months or future 90 days.     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 DORIAN Waite MD    2 months ago Morbid obesity    [facility] Cirilo Mathias MD    3 months ago Morbid obesity    [facility] Cirilo Mathias MD    4 months ago Essential hypertension    [facility] KAREEM Pantoja MD    5 months ago GIST (gastrointestinal stromal tumor), non-malignant    [facility] DORIAN Waite MD          Future Appointments              [facility]    In 6 days LAB, [place] [facility] [place]    In 6 days [facility]    In 2 weeks Zachariah Gary [facility]    In 3 weeks LAB, [place] [facility] [place]    In 4 weeks Cirilo Mathias MD [facility]    In 2 months LAB, [place] [facility]    In 2 months [facility] CT1 LIMIT 500 LBS [facility]    In 2 months DORIAN Waite MD [facility]                Passed - TSH in normal range and within 360 days     TSH   Date Value Ref Range Status   09/10/2019 1.169 0.400 - 4.000 uIU/mL Final   09/10/2019 1.169 0.400 - 4.000 uIU/mL Final   03/22/2018 1.664 0.400 - 4.000 uIU/mL Final              Passed - T4 free within 1080 days     Free T4   Date Value Ref Range Status   09/10/2019 1.34 0.71 - 1.51 ng/dL Final   04/13/2010 1.23 0.71 - 1.51 ng/dl Final   02/13/2009 1.07 0.71 - 1.51 ng/dl Final                    Appointments  past 12m or future 3m with PCP    Date Provider   Last Visit   3/18/2020 KAREEM Pantoja MD   Next Visit   Visit date not found KAREEM Pantoja MD   ED visits in past 90 days: 0     Note composed:1:37 PM 07/23/2020

## 2020-07-23 NOTE — PROGRESS NOTES
Provider Staff:     Please schedule and link patient for Labs (TSH/CBC/LIPID/CMP)    Please also check with your provider if any further labs need to be added and scheduled together.    Thanks!  [facility]     Appointments  past 12m or future 3m with PCP    Date Provider   Last Visit   3/18/2020 KAREEM Pantoja MD   Next Visit   Visit date not found KAREEM Pantoja MD     Note composed:1:37 PM 07/23/2020

## 2020-07-28 ENCOUNTER — OFFICE VISIT (OUTPATIENT)
Dept: DERMATOLOGY | Facility: CLINIC | Age: 70
End: 2020-07-28
Payer: MEDICARE

## 2020-07-28 DIAGNOSIS — D22.9 BENIGN NEVUS: ICD-10-CM

## 2020-07-28 DIAGNOSIS — L82.1 SEBORRHEIC KERATOSES: ICD-10-CM

## 2020-07-28 DIAGNOSIS — L81.4 LENTIGINES: Primary | ICD-10-CM

## 2020-07-28 DIAGNOSIS — B07.8 COMMON WART: ICD-10-CM

## 2020-07-28 DIAGNOSIS — L57.0 ACTINIC KERATOSIS: ICD-10-CM

## 2020-07-28 PROCEDURE — 17110 DESTRUCTION B9 LES UP TO 14: CPT | Mod: S$PBB,,, | Performed by: DERMATOLOGY

## 2020-07-28 PROCEDURE — 99202 PR OFFICE/OUTPT VISIT, NEW, LEVL II, 15-29 MIN: ICD-10-PCS | Mod: 25,S$PBB,, | Performed by: DERMATOLOGY

## 2020-07-28 PROCEDURE — 99999 PR PBB SHADOW E&M-EST. PATIENT-LVL I: CPT | Mod: PBBFAC,,, | Performed by: DERMATOLOGY

## 2020-07-28 PROCEDURE — 99202 OFFICE O/P NEW SF 15 MIN: CPT | Mod: 25,S$PBB,, | Performed by: DERMATOLOGY

## 2020-07-28 PROCEDURE — 99211 OFF/OP EST MAY X REQ PHY/QHP: CPT | Mod: PBBFAC,PO,25 | Performed by: DERMATOLOGY

## 2020-07-28 PROCEDURE — 17110 DESTRUCTION B9 LES UP TO 14: CPT | Mod: 59,PBBFAC,PO | Performed by: DERMATOLOGY

## 2020-07-28 PROCEDURE — 17000 DESTRUCT PREMALG LESION: CPT | Mod: S$PBB,59,, | Performed by: DERMATOLOGY

## 2020-07-28 PROCEDURE — 99999 PR PBB SHADOW E&M-EST. PATIENT-LVL I: ICD-10-PCS | Mod: PBBFAC,,, | Performed by: DERMATOLOGY

## 2020-07-28 PROCEDURE — 17110 PR DESTRUCTION BENIGN LESIONS UP TO 14: ICD-10-PCS | Mod: S$PBB,,, | Performed by: DERMATOLOGY

## 2020-07-28 PROCEDURE — 17000 PR DESTRUCTION(LASER SURGERY,CRYOSURGERY,CHEMOSURGERY),PREMALIGNANT LESIONS,FIRST LESION: ICD-10-PCS | Mod: S$PBB,59,, | Performed by: DERMATOLOGY

## 2020-07-28 PROCEDURE — 17000 DESTRUCT PREMALG LESION: CPT | Mod: PBBFAC,PO | Performed by: DERMATOLOGY

## 2020-07-28 NOTE — PROGRESS NOTES
Subjective:       Patient ID:  Nancy Figueroa is a 70 y.o. female who presents for   Chief Complaint   Patient presents with    Spot     69 yo F presents for several lesions.  Last OV 2015 with Dr. Castellanos  Lesion on right cheek, rough but asymptomatic, present for 3 months, no treatment   Lesion on left first finger, present for 6 month, tried OTC wart medication, not helpful    Brother with h/o melanoma  Neg [facility] skin ca           Past Medical History:   Diagnosis Date    Atherosclerotic heart disease of native coronary artery without angina pectoris 5/1/2017 4/17  Left main: normal  LAD: two diagonals  Less than 50%.  90% mid LAD Circumflex:  minimal disease, less than 20%. --  RCA: The vessel was large sized (dominant) with less than 20% stenosis.    Cataract     OU    Essential hypertension 10/18/2012    GERD (gastroesophageal reflux disease)     HTN (hypertension)     Hypothyroidism     NSTEMI (non-ST elevated myocardial infarction) 4/24/2017 4/2017    Stented coronary artery 7/26/2017 4/17 LAD-Synergy 3.0 x 28        Review of Systems   Constitutional: Negative for fever and chills.   Respiratory: Negative for cough and shortness of breath.    Skin: Positive for activity-related sunscreen use. Negative for daily sunscreen use.        Objective:    Physical Exam   Constitutional: She appears well-developed and well-nourished.   Neurological: She is alert and oriented to person, place, and time.   Psychiatric: She has a normal mood and affect.   Skin:   Areas Examined (abnormalities noted in diagram):   Head / Face Inspection Performed  Neck Inspection Performed  Chest / Axilla Inspection Performed  Back Inspection Performed  RUE Inspected  LUE Inspection Performed                       Diagram Legend     Erythematous scaling macule/papule c/w actinic keratosis       Vascular papule c/w angioma      Pigmented verrucoid papule/plaque c/w seborrheic keratosis      Yellow umbilicated papule  c/w sebaceous hyperplasia      Irregularly shaped tan macule c/w lentigo     1-2 mm smooth white papules consistent with Milia      Movable subcutaneous cyst with punctum c/w epidermal inclusion cyst      Subcutaneous movable cyst c/w pilar cyst      Firm pink to brown papule c/w dermatofibroma      Pedunculated fleshy papule(s) c/w skin tag(s)      Evenly pigmented macule c/w junctional nevus     Mildly variegated pigmented, slightly irregular-bordered macule c/w mildly atypical nevus      Flesh colored to evenly pigmented papule c/w intradermal nevus       Pink pearly papule/plaque c/w basal cell carcinoma      Erythematous hyperkeratotic cursted plaque c/w SCC      Surgical scar with no sign of skin cancer recurrence      Open and closed comedones      Inflammatory papules and pustules      Verrucoid papule consistent consistent with wart     Erythematous eczematous patches and plaques     Dystrophic onycholytic nail with subungual debris c/w onychomycosis     Umbilicated papule    Erythematous-base heme-crusted tan verrucoid plaque consistent with inflamed seborrheic keratosis     Erythematous Silvery Scaling Plaque c/w Psoriasis     See annotation      Assessment / Plan:        Lentigines  These are benign hyperpigmented sun induced lesions. Daily sun protection will reduce the number of new lesions    Seborrheic keratoses  These are benign inherited growths without a malignant potential. Reassurance given to patient. No treatment is necessary. '    Benign nevus  Reassurance that her nevus appears benign with regular pigment pattern on dermoscopy    Actinic keratosis  Cryosurgery Procedure Note    Verbal consent from the patient is obtained and the patient is aware of the precancerous quality and need for treatment of these lesions. Liquid nitrogen cryosurgery is applied to the 1 actinic keratosis, as detailed in the physical exam, to produce a freeze injury. The patient is aware that blisters may form and is  instructed on wound care with gentle cleansing and use of vaseline ointment to keep moist until healed. The patient is instructed to call if lesions do not completely resolve.    Common wart  Cryosurgery procedure note:    Verbal consent from the patient is obtained. Liquid nitrogen cryosurgery is applied to 1 verruca, as detailed in the physical exam, to produce a freeze injury. 3 consecutive freeze thaw cycles are applied to each verruca. The patient is aware that blisters (possibly blood blisters) may form.         Follow up in about 4 weeks (around 8/25/2020).

## 2020-07-29 ENCOUNTER — CLINICAL SUPPORT (OUTPATIENT)
Dept: CARDIOLOGY | Facility: CLINIC | Age: 70
End: 2020-07-29
Attending: INTERNAL MEDICINE
Payer: MEDICARE

## 2020-07-29 VITALS — HEIGHT: 64 IN | BODY MASS INDEX: 37.9 KG/M2 | WEIGHT: 222 LBS

## 2020-07-29 DIAGNOSIS — I25.10 ATHEROSCLEROSIS OF NATIVE CORONARY ARTERY OF NATIVE HEART WITHOUT ANGINA PECTORIS: ICD-10-CM

## 2020-07-29 DIAGNOSIS — E78.5 DYSLIPIDEMIA: ICD-10-CM

## 2020-07-29 DIAGNOSIS — I10 ESSENTIAL HYPERTENSION: ICD-10-CM

## 2020-07-29 DIAGNOSIS — Z95.5 STENTED CORONARY ARTERY: ICD-10-CM

## 2020-07-29 DIAGNOSIS — I21.4 NSTEMI (NON-ST ELEVATED MYOCARDIAL INFARCTION): ICD-10-CM

## 2020-07-29 PROCEDURE — 99211 OFF/OP EST MAY X REQ PHY/QHP: CPT | Mod: PBBFAC,PO,25

## 2020-07-29 PROCEDURE — 93306 TTE W/DOPPLER COMPLETE: CPT | Mod: PBBFAC,PO | Performed by: INTERNAL MEDICINE

## 2020-07-29 PROCEDURE — 99999 PR PBB SHADOW E&M-EST. PATIENT-LVL I: ICD-10-PCS | Mod: PBBFAC,,,

## 2020-07-29 PROCEDURE — 99999 PR PBB SHADOW E&M-EST. PATIENT-LVL I: CPT | Mod: PBBFAC,,,

## 2020-07-29 PROCEDURE — 93306 TRANSTHORACIC ECHO (TTE) COMPLETE (CUPID ONLY): ICD-10-PCS | Mod: 26,S$PBB,, | Performed by: INTERNAL MEDICINE

## 2020-08-04 LAB
ASCENDING AORTA: 2.61 CM
AV INDEX (PROSTH): 0.66
AV MEAN GRADIENT: 6 MMHG
AV PEAK GRADIENT: 12 MMHG
AV VALVE AREA: 2.39 CM2
AV VELOCITY RATIO: 0.62
BSA FOR ECHO PROCEDURE: 2.12 M2
CV ECHO LV RWT: 0.4 CM
DOP CALC AO PEAK VEL: 1.72 M/S
DOP CALC AO VTI: 41.31 CM
DOP CALC LVOT AREA: 3.6 CM2
DOP CALC LVOT DIAMETER: 2.14 CM
DOP CALC LVOT PEAK VEL: 1.07 M/S
DOP CALC LVOT STROKE VOLUME: 98.57 CM3
DOP CALCLVOT PEAK VEL VTI: 27.42 CM
E WAVE DECELERATION TIME: 260.92 MSEC
E/A RATIO: 1.61
E/E' RATIO: 11.88 M/S
ECHO LV POSTERIOR WALL: 0.92 CM (ref 0.6–1.1)
FRACTIONAL SHORTENING: 36 % (ref 28–44)
INTERVENTRICULAR SEPTUM: 0.81 CM (ref 0.6–1.1)
IVRT: 102.76 MSEC
LA MAJOR: 5.2 CM
LA MINOR: 5.16 CM
LA WIDTH: 3.41 CM
LEFT ATRIUM SIZE: 3.95 CM
LEFT ATRIUM VOLUME INDEX: 29.2 ML/M2
LEFT ATRIUM VOLUME: 59.31 CM3
LEFT INTERNAL DIMENSION IN SYSTOLE: 2.96 CM (ref 2.1–4)
LEFT VENTRICLE DIASTOLIC VOLUME INDEX: 48.7 ML/M2
LEFT VENTRICLE DIASTOLIC VOLUME: 99.01 ML
LEFT VENTRICLE MASS INDEX: 65 G/M2
LEFT VENTRICLE SYSTOLIC VOLUME INDEX: 16.6 ML/M2
LEFT VENTRICLE SYSTOLIC VOLUME: 33.74 ML
LEFT VENTRICULAR INTERNAL DIMENSION IN DIASTOLE: 4.63 CM (ref 3.5–6)
LEFT VENTRICULAR MASS: 132.06 G
LV LATERAL E/E' RATIO: 11.88 M/S
LV SEPTAL E/E' RATIO: 11.88 M/S
MV PEAK A VEL: 0.59 M/S
MV PEAK E VEL: 0.95 M/S
PISA TR MAX VEL: 2.65 M/S
PULM VEIN S/D RATIO: 1
PV PEAK D VEL: 0.52 M/S
PV PEAK S VEL: 0.52 M/S
RA MAJOR: 4.62 CM
RA PRESSURE: 3 MMHG
RA WIDTH: 3.41 CM
RIGHT VENTRICULAR END-DIASTOLIC DIMENSION: 3.48 CM
SINUS: 2.43 CM
STJ: 2.31 CM
TDI LATERAL: 0.08 M/S
TDI SEPTAL: 0.08 M/S
TDI: 0.08 M/S
TR MAX PG: 28 MMHG
TRICUSPID ANNULAR PLANE SYSTOLIC EXCURSION: 2.3 CM
TV REST PULMONARY ARTERY PRESSURE: 31 MMHG

## 2020-08-06 NOTE — PROGRESS NOTES
Digital Medicine: Health  Follow-Up    The history is provided by the patient.             Reason for review: Blood pressure at goal        Topics Covered on Call: Timing of readings, sharing the cuff    Additional Follow-up details: Patient explained that she exchanged her cuff for a smaller size and is happy with the new cuff.    She admitted that she needs to take more readings, and plans to take a recent reading this morning.    She has been purposely taking her blood pressure readings at different times of the day, but ensures that she is taking it at least an hour after blood pressure meds, etc.    She asked if another person in her household could use her cuff, and I instructed her to have them download their own ihealth patsy onto their phone, otherwise their readings would be included as her own.    She briefly discussed that her sister has been staying with her for the last three months but is moving out today.        Diet-Not assessed          Physical Activity-Not assessed    Medication Adherence-Medication Adherence not addressed.      Substance, Sleep, Stress-Not assessed      Additional monitoring needed.  Provided patient education.  Reviewed Device Techniques.     Patient verbalizes understanding.      Explained the importance of self-monitoring and medication adherence. Encouraged the patient to communicate with their health  for lifestyle modifications to help improve or maintain a healthy lifestyle.            There are no preventive care reminders to display for this patient.    Last 5 Patient Entered Readings                                      Current 30 Day Average: 128/80     Recent Readings 7/29/2020 7/28/2020 7/25/2020 7/24/2020 7/14/2020    SBP (mmHg) 124 114 129 134 138    DBP (mmHg) 76 80 75 79 88    Pulse 64 68 61 70 70

## 2020-08-11 ENCOUNTER — OFFICE VISIT (OUTPATIENT)
Dept: CARDIOLOGY | Facility: CLINIC | Age: 70
End: 2020-08-11
Payer: MEDICARE

## 2020-08-11 VITALS
DIASTOLIC BLOOD PRESSURE: 80 MMHG | WEIGHT: 222 LBS | BODY MASS INDEX: 39.34 KG/M2 | HEART RATE: 69 BPM | HEIGHT: 63 IN | SYSTOLIC BLOOD PRESSURE: 132 MMHG

## 2020-08-11 DIAGNOSIS — I25.2 HISTORY OF NON-ST ELEVATION MYOCARDIAL INFARCTION (NSTEMI): ICD-10-CM

## 2020-08-11 DIAGNOSIS — I25.10 ATHEROSCLEROSIS OF NATIVE CORONARY ARTERY OF NATIVE HEART WITHOUT ANGINA PECTORIS: ICD-10-CM

## 2020-08-11 DIAGNOSIS — Z95.5 STENTED CORONARY ARTERY: Primary | Chronic | ICD-10-CM

## 2020-08-11 DIAGNOSIS — I10 ESSENTIAL HYPERTENSION: ICD-10-CM

## 2020-08-11 DIAGNOSIS — E78.5 DYSLIPIDEMIA: ICD-10-CM

## 2020-08-11 PROCEDURE — 99213 OFFICE O/P EST LOW 20 MIN: CPT | Mod: PBBFAC,PO | Performed by: INTERNAL MEDICINE

## 2020-08-11 PROCEDURE — 99214 OFFICE O/P EST MOD 30 MIN: CPT | Mod: S$PBB,,, | Performed by: INTERNAL MEDICINE

## 2020-08-11 PROCEDURE — 99999 PR PBB SHADOW E&M-EST. PATIENT-LVL III: ICD-10-PCS | Mod: PBBFAC,,, | Performed by: INTERNAL MEDICINE

## 2020-08-11 PROCEDURE — 99999 PR PBB SHADOW E&M-EST. PATIENT-LVL III: CPT | Mod: PBBFAC,,, | Performed by: INTERNAL MEDICINE

## 2020-08-11 PROCEDURE — 99214 PR OFFICE/OUTPT VISIT, EST, LEVL IV, 30-39 MIN: ICD-10-PCS | Mod: S$PBB,,, | Performed by: INTERNAL MEDICINE

## 2020-08-11 RX ORDER — ATORVASTATIN CALCIUM 10 MG/1
10 TABLET, FILM COATED ORAL DAILY
Qty: 90 TABLET | Refills: 4 | Status: SHIPPED | OUTPATIENT
Start: 2020-08-11 | End: 2021-08-17 | Stop reason: SDUPTHER

## 2020-08-11 RX ORDER — CARVEDILOL 6.25 MG/1
6.25 TABLET ORAL 2 TIMES DAILY
Qty: 180 TABLET | Refills: 4 | Status: SHIPPED | OUTPATIENT
Start: 2020-08-11 | End: 2021-08-17 | Stop reason: SDUPTHER

## 2020-08-11 RX ORDER — AMLODIPINE BESYLATE 2.5 MG/1
TABLET ORAL
Qty: 90 TABLET | Refills: 4 | Status: SHIPPED | OUTPATIENT
Start: 2020-08-11 | End: 2021-02-02 | Stop reason: DRUGHIGH

## 2020-08-11 NOTE — PROGRESS NOTES
Subjective:    Patient ID:  Nancy Figueroa is a 70 y.o. female who presents for follow-up of No chief complaint on file.      HPI  Here for follow up of CAD-PCI (4/17 BRYNN) after small NSTEMI. Very active no angina.     Review of Systems   Constitution: Negative for malaise/fatigue.   Eyes: Negative for blurred vision.   Cardiovascular: Negative for chest pain, claudication, cyanosis, dyspnea on exertion, irregular heartbeat, leg swelling, near-syncope, orthopnea, palpitations, paroxysmal nocturnal dyspnea and syncope.   Respiratory: Negative for cough and shortness of breath.    Hematologic/Lymphatic: Does not bruise/bleed easily.   Musculoskeletal: Negative for back pain, falls, joint pain, muscle cramps, muscle weakness and myalgias.   Gastrointestinal: Negative for abdominal pain, change in bowel habit, nausea and vomiting.   Genitourinary: Negative for urgency.   Neurological: Negative for dizziness, focal weakness and light-headedness.       Past Medical History:   Diagnosis Date    Atherosclerotic heart disease of native coronary artery without angina pectoris 5/1/2017 4/17  Left main: normal  LAD: two diagonals  Less than 50%.  90% mid LAD Circumflex:  minimal disease, less than 20%. --  RCA: The vessel was large sized (dominant) with less than 20% stenosis.    Cataract     OU    Essential hypertension 10/18/2012    GERD (gastroesophageal reflux disease)     HTN (hypertension)     Hypothyroidism     NSTEMI (non-ST elevated myocardial infarction) 4/24/2017 4/2017    Stented coronary artery 7/26/2017 4/17 LAD-Synergy 3.0 x 28      Patient Active Problem List   Diagnosis    Essential hypertension    Hypothyroidism    Shortness of breath    Morbid obesity    Onychomycosis    Acute hypokalemia    History of non-ST elevation myocardial infarction (NSTEMI)    Atherosclerotic heart disease of native coronary artery without angina pectoris    Stented coronary artery    Post-menopause     Dyslipidemia    Encounter for psychological assessment prior to bariatric surgery    Gastrointestinal stromal tumor (GIST) of stomach        Objective:     Vitals:    08/11/20 0948   BP: 132/80   Pulse: 69        Physical Exam   Constitutional: She is oriented to person, place, and time. She appears well-developed and well-nourished.   HENT:   Head: Normocephalic.   Eyes: Conjunctivae are normal.   Neck: Normal range of motion. Neck supple. No JVD present.   Cardiovascular: Normal rate, regular rhythm, normal heart sounds and intact distal pulses.   Pulses:       Carotid pulses are 2+ on the right side and 2+ on the left side.       Radial pulses are 2+ on the right side and 2+ on the left side.        Dorsalis pedis pulses are 2+ on the right side and 2+ on the left side.        Posterior tibial pulses are 2+ on the right side and 2+ on the left side.   Pulmonary/Chest: Effort normal and breath sounds normal.   Abdominal: Soft. Bowel sounds are normal.   Musculoskeletal:         General: No tenderness or edema.   Neurological: She is alert and oriented to person, place, and time. Gait normal.   Skin: Skin is warm, dry and intact. No cyanosis. Nails show no clubbing.   Psychiatric: She has a normal mood and affect. Her speech is normal and behavior is normal. Thought content normal.   Nursing note and vitals reviewed.            ..    Chemistry        Component Value Date/Time     07/29/2020 0835    K 4.2 07/29/2020 0835     07/29/2020 0835    CO2 26 07/29/2020 0835    BUN 13 07/29/2020 0835    CREATININE 0.8 07/29/2020 0835    GLU 96 07/29/2020 0835        Component Value Date/Time    CALCIUM 9.5 07/29/2020 0835    ALKPHOS 137 (H) 07/29/2020 0835    AST 15 07/29/2020 0835    ALT 13 07/29/2020 0835    BILITOT 2.0 (H) 07/29/2020 0835    ESTGFRAFRICA >60.0 07/29/2020 0835    EGFRNONAA >60.0 07/29/2020 0835            ..  Lab Results   Component Value Date    CHOL 137 07/29/2020    CHOL 159 09/10/2019     CHOL 159 09/10/2019     Lab Results   Component Value Date    HDL 53 07/29/2020    HDL 49 09/10/2019    HDL 49 09/10/2019     Lab Results   Component Value Date    LDLCALC 59.8 (L) 07/29/2020    LDLCALC 80.0 09/10/2019    LDLCALC 80.0 09/10/2019     Lab Results   Component Value Date    TRIG 121 07/29/2020    TRIG 150 09/10/2019    TRIG 150 09/10/2019     Lab Results   Component Value Date    CHOLHDL 38.7 07/29/2020    CHOLHDL 30.8 09/10/2019    CHOLHDL 30.8 09/10/2019     ..  Lab Results   Component Value Date    WBC 5.51 07/29/2020    HGB 12.8 07/29/2020    HCT 41.5 07/29/2020    MCV 93 07/29/2020     07/29/2020       Test(s) Reviewed  I have reviewed the following in detail:  [] Stress test   [] Angiography   [x] Echocardiogram   [x] Labs   [] Other:       Assessment:         ICD-10-CM ICD-9-CM   1. Stented coronary artery  Z95.5 V45.82   2. Essential hypertension  I10 401.9   3. Atherosclerosis of native coronary artery of native heart without angina pectoris  I25.10 414.01   4. Dyslipidemia  E78.5 272.4   5. History of non-ST elevation myocardial infarction (NSTEMI)  I25.2 412     Problem List Items Addressed This Visit     Stented coronary artery - Primary (Chronic)    Overview     4/17 LAD-Synergy 3.0 x 28          Essential hypertension    History of non-ST elevation myocardial infarction (NSTEMI)    Overview     4/2017         Atherosclerotic heart disease of native coronary artery without angina pectoris    Overview     4/17   Left main: normal   LAD: two diagonals  Less than 50%.  90% mid LAD  Circumflex:  minimal disease, less than 20%.  --  RCA: The vessel was large sized (dominant) with less than 20% stenosis.         Dyslipidemia           Plan:             Return to clinic 1 year   Aerobic exercise 5x's/wk. Heart healthy diet and risk factor modification.    See labs and med orders.      Portions of this note may have been created with voice recognition software.  Grammatical, syntax and  spelling errors may be inevitable.

## 2020-08-13 ENCOUNTER — LAB VISIT (OUTPATIENT)
Dept: LAB | Facility: HOSPITAL | Age: 70
End: 2020-08-13
Attending: SURGERY
Payer: MEDICARE

## 2020-08-13 ENCOUNTER — TELEPHONE (OUTPATIENT)
Dept: FAMILY MEDICINE | Facility: CLINIC | Age: 70
End: 2020-08-13

## 2020-08-13 DIAGNOSIS — Z12.31 ENCOUNTER FOR SCREENING MAMMOGRAM FOR MALIGNANT NEOPLASM OF BREAST: Primary | ICD-10-CM

## 2020-08-13 DIAGNOSIS — E61.1 IRON DEFICIENCY: ICD-10-CM

## 2020-08-13 DIAGNOSIS — E55.9 VITAMIN D DEFICIENCY: ICD-10-CM

## 2020-08-13 DIAGNOSIS — E53.8 B12 DEFICIENCY: ICD-10-CM

## 2020-08-13 DIAGNOSIS — E66.01 MORBID OBESITY: ICD-10-CM

## 2020-08-13 LAB
25(OH)D3+25(OH)D2 SERPL-MCNC: 24 NG/ML (ref 30–96)
ALBUMIN SERPL BCP-MCNC: 3.7 G/DL (ref 3.5–5.2)
ALP SERPL-CCNC: 148 U/L (ref 55–135)
ALT SERPL W/O P-5'-P-CCNC: 13 U/L (ref 10–44)
ANION GAP SERPL CALC-SCNC: 6 MMOL/L (ref 8–16)
AST SERPL-CCNC: 18 U/L (ref 10–40)
BASOPHILS # BLD AUTO: 0.05 K/UL (ref 0–0.2)
BASOPHILS NFR BLD: 0.9 % (ref 0–1.9)
BILIRUB SERPL-MCNC: 1.6 MG/DL (ref 0.1–1)
BUN SERPL-MCNC: 11 MG/DL (ref 8–23)
CALCIUM SERPL-MCNC: 9.2 MG/DL (ref 8.7–10.5)
CHLORIDE SERPL-SCNC: 110 MMOL/L (ref 95–110)
CHOLEST SERPL-MCNC: 154 MG/DL (ref 120–199)
CHOLEST/HDLC SERPL: 2.8 {RATIO} (ref 2–5)
CO2 SERPL-SCNC: 25 MMOL/L (ref 23–29)
CREAT SERPL-MCNC: 0.8 MG/DL (ref 0.5–1.4)
DIFFERENTIAL METHOD: ABNORMAL
EOSINOPHIL # BLD AUTO: 0.1 K/UL (ref 0–0.5)
EOSINOPHIL NFR BLD: 2.3 % (ref 0–8)
ERYTHROCYTE [DISTWIDTH] IN BLOOD BY AUTOMATED COUNT: 13.2 % (ref 11.5–14.5)
EST. GFR  (AFRICAN AMERICAN): >60 ML/MIN/1.73 M^2
EST. GFR  (NON AFRICAN AMERICAN): >60 ML/MIN/1.73 M^2
GLUCOSE SERPL-MCNC: 99 MG/DL (ref 70–110)
HCT VFR BLD AUTO: 41.5 % (ref 37–48.5)
HDLC SERPL-MCNC: 55 MG/DL (ref 40–75)
HDLC SERPL: 35.7 % (ref 20–50)
HGB BLD-MCNC: 13.1 G/DL (ref 12–16)
IMM GRANULOCYTES # BLD AUTO: 0.01 K/UL (ref 0–0.04)
IMM GRANULOCYTES NFR BLD AUTO: 0.2 % (ref 0–0.5)
IRON SERPL-MCNC: 62 UG/DL (ref 30–160)
LDLC SERPL CALC-MCNC: 79 MG/DL (ref 63–159)
LYMPHOCYTES # BLD AUTO: 1.5 K/UL (ref 1–4.8)
LYMPHOCYTES NFR BLD: 27.9 % (ref 18–48)
MCH RBC QN AUTO: 29 PG (ref 27–31)
MCHC RBC AUTO-ENTMCNC: 31.6 G/DL (ref 32–36)
MCV RBC AUTO: 92 FL (ref 82–98)
MONOCYTES # BLD AUTO: 0.4 K/UL (ref 0.3–1)
MONOCYTES NFR BLD: 8.3 % (ref 4–15)
NEUTROPHILS # BLD AUTO: 3.2 K/UL (ref 1.8–7.7)
NEUTROPHILS NFR BLD: 60.4 % (ref 38–73)
NONHDLC SERPL-MCNC: 99 MG/DL
NRBC BLD-RTO: 0 /100 WBC
PLATELET # BLD AUTO: 278 K/UL (ref 150–350)
PMV BLD AUTO: 11.8 FL (ref 9.2–12.9)
POTASSIUM SERPL-SCNC: 4.2 MMOL/L (ref 3.5–5.1)
PROT SERPL-MCNC: 7.6 G/DL (ref 6–8.4)
RBC # BLD AUTO: 4.51 M/UL (ref 4–5.4)
SATURATED IRON: 15 % (ref 20–50)
SODIUM SERPL-SCNC: 141 MMOL/L (ref 136–145)
TOTAL IRON BINDING CAPACITY: 410 UG/DL (ref 250–450)
TRANSFERRIN SERPL-MCNC: 277 MG/DL (ref 200–375)
TRIGL SERPL-MCNC: 100 MG/DL (ref 30–150)
VIT B12 SERPL-MCNC: 417 PG/ML (ref 210–950)
WBC # BLD AUTO: 5.27 K/UL (ref 3.9–12.7)

## 2020-08-13 PROCEDURE — 84425 ASSAY OF VITAMIN B-1: CPT

## 2020-08-13 PROCEDURE — 80061 LIPID PANEL: CPT

## 2020-08-13 PROCEDURE — 82306 VITAMIN D 25 HYDROXY: CPT

## 2020-08-13 PROCEDURE — 82607 VITAMIN B-12: CPT

## 2020-08-13 PROCEDURE — 83540 ASSAY OF IRON: CPT

## 2020-08-13 PROCEDURE — 85025 COMPLETE CBC W/AUTO DIFF WBC: CPT

## 2020-08-13 PROCEDURE — 80053 COMPREHEN METABOLIC PANEL: CPT

## 2020-08-13 NOTE — TELEPHONE ENCOUNTER
----- Message from Princess VALENTINE Ospina sent at 8/13/2020 10:29 AM CDT -----  Regarding: order  Contact: pt  Type: Needs Medical Advice  Who Called:  pt  Best Call Back Number: [phone] (home)     Additional Information requesting a order for MAMMO to be put in the system. Please call when order is attached.

## 2020-08-18 LAB — VIT B1 BLD-MCNC: 50 UG/L (ref 38–122)

## 2020-08-20 ENCOUNTER — PATIENT OUTREACH (OUTPATIENT)
Dept: OTHER | Facility: OTHER | Age: 70
End: 2020-08-20

## 2020-08-20 ENCOUNTER — OFFICE VISIT (OUTPATIENT)
Dept: BARIATRICS | Facility: CLINIC | Age: 70
End: 2020-08-20
Payer: MEDICARE

## 2020-08-20 VITALS
WEIGHT: 218.63 LBS | HEIGHT: 64 IN | SYSTOLIC BLOOD PRESSURE: 166 MMHG | DIASTOLIC BLOOD PRESSURE: 74 MMHG | RESPIRATION RATE: 17 BRPM | TEMPERATURE: 98 F | BODY MASS INDEX: 37.33 KG/M2 | HEART RATE: 66 BPM

## 2020-08-20 DIAGNOSIS — E78.5 DYSLIPIDEMIA: ICD-10-CM

## 2020-08-20 DIAGNOSIS — E53.8 B12 DEFICIENCY: ICD-10-CM

## 2020-08-20 DIAGNOSIS — E61.1 IRON DEFICIENCY: ICD-10-CM

## 2020-08-20 DIAGNOSIS — E56.9 VITAMIN DEFICIENCY: ICD-10-CM

## 2020-08-20 DIAGNOSIS — E66.01 MORBID OBESITY WITH BMI OF 40.0-44.9, ADULT: ICD-10-CM

## 2020-08-20 DIAGNOSIS — E66.01 MORBID OBESITY: Primary | ICD-10-CM

## 2020-08-20 PROCEDURE — 99999 PR PBB SHADOW E&M-EST. PATIENT-LVL III: CPT | Mod: PBBFAC,,, | Performed by: SURGERY

## 2020-08-20 PROCEDURE — 99213 OFFICE O/P EST LOW 20 MIN: CPT | Mod: S$PBB,,, | Performed by: SURGERY

## 2020-08-20 PROCEDURE — 99999 PR PBB SHADOW E&M-EST. PATIENT-LVL III: ICD-10-PCS | Mod: PBBFAC,,, | Performed by: SURGERY

## 2020-08-20 PROCEDURE — 99213 PR OFFICE/OUTPT VISIT, EST, LEVL III, 20-29 MIN: ICD-10-PCS | Mod: S$PBB,,, | Performed by: SURGERY

## 2020-08-20 PROCEDURE — 99213 OFFICE O/P EST LOW 20 MIN: CPT | Mod: PBBFAC,PO | Performed by: SURGERY

## 2020-08-20 NOTE — PROGRESS NOTES
Digital Medicine: Clinician Follow-Up    Called patient to follow up. Overall she has been doing well, bu sendy noticed within the past week or so her BP has been very elevated. She is unsure why this is occurring, but suspects it may have to do with her recent traveling. She just returned from a trip and at the time of the call was packing to take another weekend trip. She reports possibly eating more processed foods or those with a higher sodium intake.     The history is provided by the patient.      Review of patient's allergies indicates:  No Known Allergies    Hypertension    Readings are trending up due to lifestyle change.  Patient is not experiencing signs/symptoms of hypertension.          Last 5 Patient Entered Readings                                      Current 30 Day Average: 133/83     Recent Readings 8/20/2020 8/19/2020 8/19/2020 8/18/2020 8/18/2020    SBP (mmHg) 156 161 151 139 145    DBP (mmHg) 95 102 93 83 83    Pulse 68 76 72 76 88               Depression Screening  Did not address depression screening.    Sleep Apnea Screening    Did not address sleep apnea screening.     Medication Affordability Screening  Did not address medication affordability screening.     Medication Adherence-Medication adherence was assessed.        Continues to take amlodipine 2.5 mg at 6pm, and carvedilol 6.25 mg BID at 6am and 6pm. She states that Dr. Gary considered decreasing her med regimen.       ASSESSMENT(S)  Patients BP average is 133/83 mmHg, which is above goal. Patient's BP goal is less than or equal to 130/80 per 2017 ACC/AHA Hypertension Guidelines.     Average BP slightly above goal, within 5 mmHg which does not warrant med therapy changes at this time. However, BP has sharply risen over the past week - likely d/t nutrition.       Hypertension Plan  Continue current therapy. Amlodipine and carvedilol as prescribed.   Provided patient education. Reviewed BP monitoring technique.   I encouraged patient to  take BP cuff with her over the weekend to monitor BP readings. I counseled her to eat low sodium foods and increase water intake. Will f/u in 2-3 weeks to assess increased BP.     Addressed any questions or concerns and patient has my contact information if needed prior to next outreach. Patient verbalizes understanding.            There are no preventive care reminders to display for this patient.      Hypertension Medications             amLODIPine (NORVASC) 2.5 MG tablet TAKE 1 TABLET(2.5 MG) BY MOUTH EVERY DAY    carvediloL (COREG) 6.25 MG tablet Take 1 tablet (6.25 mg total) by mouth 2 (two) times daily.

## 2020-08-20 NOTE — PROGRESS NOTES
Post Op Note    Surgery: gastric sleeve surgery  Date: 1/27/20  Initial weight: 262  Last weight: 224  Current weight: 218    Constipation: none  Reflux: none  Vomiting: none    Diet:  Breakfast:  yogurt  Lunch: hamburger zuleyma with salad   Dinner: yogurt or protein shake  Snack: chips    Exercise:  None scheduled     MVI: one daily, b12, calcium    Vitals:    08/20/20 0937   BP: (!) 166/74   Pulse: 66   Resp: 17   Temp: 98.3 °F (36.8 °C)       Body comp:  Fat Percent:  50.9 %  Fat Mass:  111.2 lb  FFM:  107.4 lb  TBW: 77 lb  TBW %:  35.2 %  BMR: 1537 kcal    PE:  NAD  RRR  Soft/nt/nd  Incisions: well healed    Labs: none    A/P: s/p sleeve     Labs reviewed- vitamin d insuf- otc or more sunlight  Ct reviewed: obvious hiatal hernia recurrence- no current symptoms- will follow over time possible follow endoscopy in the future; Has a Ct scan pending for cancer surveillence    Counseling for patient:    Diet: STOP EATING CHIPS, try to limit to 3 meals and 1 snack per day  Exercise: as much as possible  Vitamins: 2 mvi, calcium, b12    Co morbidities:     1. Morbid obesity  CBC w/ Auto Differential    CMP    B1    Lipid Panel   2. Morbid obesity with BMI of 40.0-44.9, adult     3. Dyslipidemia     4. B12 deficiency  B12   5. Vitamin deficiency     6. Iron deficiency  Iron Studies       -All above: Evaluated, monitored, and treated with diet and exercise program.        : I met with the patient for 15 minutes and counseled her for over 50% of that time

## 2020-09-14 ENCOUNTER — PATIENT OUTREACH (OUTPATIENT)
Dept: OTHER | Facility: OTHER | Age: 70
End: 2020-09-14

## 2020-09-14 NOTE — PROGRESS NOTES
Digital Medicine: Clinician Follow-Up    Patient took reading this morning, it was elevated but she states that it was while trying to help a child with their homework. She has been feeling fine, no issues or complaints with medications. Of note, prior use of amlodipine 5 mg was decreased d/t at goal BP, patient did not experience any ADRs.     The history is provided by the patient.   Follow-up reason(s): routine follow up.     Hypertension    Readings are trending down Patient is not experiencing signs/symptoms of hypertension.          Last 5 Patient Entered Readings                                      Current 30 Day Average: 135/89     Recent Readings 9/14/2020 9/9/2020 8/30/2020 8/28/2020 8/21/2020    SBP (mmHg) 137 117 128 138 120    DBP (mmHg) 84 79 81 90 70    Pulse 78 87 82 80 79             Screenings    ASSESSMENT(S)  Patients BP average is 135/89 mmHg, which is above goal. Patient's BP goal is less than or equal to 130/80 per 2017 ACC/AHA Hypertension Guidelines.     Average BP elevated, but patient has several at goal readings. Can increase CCB if appropriate.       Hypertension Plan  Continue current therapy. Amlodipine and carvedilol as prescribed  Continue current diet/physical activity routine. Low sodium  Provided patient education. Reviewed BP monitoring technique.  F/u in 6-8 weeks, will increase amlodipine to 5 mg if BP remains elevated.      Addressed any questions or concerns and patient has my contact information if needed prior to next outreach. Patient verbalizes understanding.            There are no preventive care reminders to display for this patient.      Hypertension Medications             amLODIPine (NORVASC) 2.5 MG tablet TAKE 1 TABLET(2.5 MG) BY MOUTH EVERY DAY    carvediloL (COREG) 6.25 MG tablet Take 1 tablet (6.25 mg total) by mouth 2 (two) times daily.

## 2020-10-01 ENCOUNTER — TELEPHONE (OUTPATIENT)
Dept: HEMATOLOGY/ONCOLOGY | Facility: CLINIC | Age: 70
End: 2020-10-01

## 2020-10-01 NOTE — TELEPHONE ENCOUNTER
Spoke with pt to notify her that Dr Waite will not be in clinic on 10/12/20 and her appt needed to be r/s to 10/19/20. Pt was r/s to 10/19/20 @ 0900. Pt verbalized understanding.

## 2020-10-02 ENCOUNTER — PATIENT OUTREACH (OUTPATIENT)
Dept: OTHER | Facility: OTHER | Age: 70
End: 2020-10-02

## 2020-10-02 NOTE — PROGRESS NOTES
Digital Medicine: Health  Follow-Up    The history is provided by the patient.             Reason for review: Blood pressure at goal        Topics Covered on Call: physical activity    Additional Follow-up details: Mrs. Figueroa plans to take a reading later today - we reviewed the importance of taking at least 3 a week.          Diet-Not assessed          Physical Activity-Change      Additional physical activity details: Patient has been riding her bike three times a week for three miles each time. She has been doing it for the last two weeks and plans to continue the new routine. We reviewed that it can effect the systolic value.      Medication Adherence-Medication Adherence not addressed.      Substance, Sleep, Stress-Not assessed      Additional monitoring needed.  Continue current diet/physical activity routine.       Addressed patient questions and patient has my contact information if needed prior to next outreach. Patient verbalizes understanding.          There are no preventive care reminders to display for this patient.    Last 5 Patient Entered Readings                                      Current 30 Day Average: 123/79     Recent Readings 9/27/2020 9/22/2020 9/18/2020 9/14/2020 9/9/2020    SBP (mmHg) 101 110 149 137 117    DBP (mmHg) 73 74 83 84 79    Pulse 88 87 66 78 87

## 2020-10-08 ENCOUNTER — HOSPITAL ENCOUNTER (OUTPATIENT)
Dept: RADIOLOGY | Facility: HOSPITAL | Age: 70
Discharge: HOME | End: 2020-10-08
Attending: FAMILY MEDICINE
Payer: MEDICARE

## 2020-10-08 ENCOUNTER — IMMUNIZATION (OUTPATIENT)
Dept: FAMILY MEDICINE | Facility: CLINIC | Age: 70
End: 2020-10-08
Payer: MEDICARE

## 2020-10-08 DIAGNOSIS — Z12.31 ENCOUNTER FOR SCREENING MAMMOGRAM FOR MALIGNANT NEOPLASM OF BREAST: ICD-10-CM

## 2020-10-08 PROCEDURE — 77067 SCR MAMMO BI INCL CAD: CPT | Mod: TC,PO

## 2020-10-08 PROCEDURE — 90694 VACC AIIV4 NO PRSRV 0.5ML IM: CPT | Mod: PBBFAC,PO

## 2020-10-08 PROCEDURE — G0008 ADMIN INFLUENZA VIRUS VAC: HCPCS | Mod: PBBFAC,PO

## 2020-10-08 PROCEDURE — 77063 MAMMO DIGITAL SCREENING BILAT WITH TOMO: ICD-10-PCS | Mod: 26,,, | Performed by: RADIOLOGY

## 2020-10-08 PROCEDURE — 77067 MAMMO DIGITAL SCREENING BILAT WITH TOMO: ICD-10-PCS | Mod: 26,,, | Performed by: RADIOLOGY

## 2020-10-08 PROCEDURE — 77063 BREAST TOMOSYNTHESIS BI: CPT | Mod: 26,,, | Performed by: RADIOLOGY

## 2020-10-08 PROCEDURE — 77067 SCR MAMMO BI INCL CAD: CPT | Mod: 26,,, | Performed by: RADIOLOGY

## 2020-10-09 ENCOUNTER — HOSPITAL ENCOUNTER (OUTPATIENT)
Dept: RADIOLOGY | Facility: HOSPITAL | Age: 70
Discharge: HOME | End: 2020-10-09
Attending: INTERNAL MEDICINE
Payer: MEDICARE

## 2020-10-09 DIAGNOSIS — C26.9 MALIGNANT NEOPLASM OF ILL-DEFINED SITES WITHIN THE DIGESTIVE SYSTEM: ICD-10-CM

## 2020-10-09 PROCEDURE — 74177 CT CHEST ABDOMEN PELVIS WITH CONTRAST (XPD): ICD-10-PCS | Mod: 26,,, | Performed by: RADIOLOGY

## 2020-10-09 PROCEDURE — 74177 CT ABD & PELVIS W/CONTRAST: CPT | Mod: 26,,, | Performed by: RADIOLOGY

## 2020-10-09 PROCEDURE — 71260 CT THORAX DX C+: CPT | Mod: 26,,, | Performed by: RADIOLOGY

## 2020-10-09 PROCEDURE — 74177 CT ABD & PELVIS W/CONTRAST: CPT | Mod: TC,PO

## 2020-10-09 PROCEDURE — 25500020 PHARM REV CODE 255: Mod: PO | Performed by: INTERNAL MEDICINE

## 2020-10-09 PROCEDURE — 71260 CT CHEST ABDOMEN PELVIS WITH CONTRAST (XPD): ICD-10-PCS | Mod: 26,,, | Performed by: RADIOLOGY

## 2020-10-09 RX ADMIN — IOHEXOL 1000 ML: 12 SOLUTION ORAL at 08:10

## 2020-10-09 RX ADMIN — IOHEXOL 100 ML: 350 INJECTION, SOLUTION INTRAVENOUS at 08:10

## 2020-10-14 ENCOUNTER — TELEPHONE (OUTPATIENT)
Dept: HEMATOLOGY/ONCOLOGY | Facility: CLINIC | Age: 70
End: 2020-10-14

## 2020-10-14 NOTE — TELEPHONE ENCOUNTER
Attempted to contact pt regarding her appt on 10/19/20 with Dr Waite. Left message with contact number for pt to return call. Pt was moved to Friday 10/23/20 @0820.

## 2020-10-15 ENCOUNTER — TELEPHONE (OUTPATIENT)
Dept: HEMATOLOGY/ONCOLOGY | Facility: CLINIC | Age: 70
End: 2020-10-15

## 2020-10-15 NOTE — TELEPHONE ENCOUNTER
----- Message from Ana Rudolph sent at 10/15/2020 10:58 AM CDT -----  Regarding: returning call  Contact: patient  Type:  Patient Returning Call    Who Called:  patient  Who Left Message for Patient:  Fidencio  Does the patient know what this is regarding?:  appointment reschedule  Best Call Back Number:  [phone] (home)   Additional Information:  Patient states the date of 10/23/20 does not work for her. She will be out of town. She can go 10/19/20 or 10/20/20 and will be back in town by 10/30/20. Please call patient. Thanks!

## 2020-10-15 NOTE — TELEPHONE ENCOUNTER
Spoke with pt regarding her message to change her appt date. Pt stated she would be out of town on that date. Pt was r/s to 10/30/20 @ 1040. Pt also notified that she can send a message through the patient portal for a quicker response. Pt verbalized understanding.

## 2020-10-21 ENCOUNTER — TELEPHONE (OUTPATIENT)
Dept: FAMILY MEDICINE | Facility: CLINIC | Age: 70
End: 2020-10-21

## 2020-10-21 DIAGNOSIS — Z95.5 STENTED CORONARY ARTERY: Chronic | ICD-10-CM

## 2020-10-21 DIAGNOSIS — I10 ESSENTIAL HYPERTENSION: ICD-10-CM

## 2020-10-21 DIAGNOSIS — E03.9 HYPOTHYROIDISM, UNSPECIFIED TYPE: ICD-10-CM

## 2020-10-21 RX ORDER — CLOPIDOGREL BISULFATE 75 MG/1
TABLET ORAL
Qty: 90 TABLET | Refills: 4 | Status: SHIPPED | OUTPATIENT
Start: 2020-10-21 | End: 2021-08-17 | Stop reason: SDUPTHER

## 2020-10-21 RX ORDER — LEVOTHYROXINE SODIUM 112 UG/1
TABLET ORAL
Qty: 90 TABLET | Refills: 0 | Status: SHIPPED | OUTPATIENT
Start: 2020-10-21 | End: 2021-02-08 | Stop reason: SDUPTHER

## 2020-10-21 NOTE — TELEPHONE ENCOUNTER
No new care gaps identified.  Powered by [employer]. Reference number: [id number]. 10/21/2020 6:07:34 AM   AKIL

## 2020-10-21 NOTE — TELEPHONE ENCOUNTER
Provider Staff:     Action is required for this patient.     Please schedule patient for Labs (TSH)    Thanks!  [facility]     Appointments  past 12m or future 3m with PCP    Date Provider   Last Visit   3/18/2020 KAREEM Pantoja MD   Next Visit   Visit date not found KAREEM Pantoja MD     Note composed:10:31 AM 10/21/2020

## 2020-10-21 NOTE — PROGRESS NOTES
Refill Authorization Note   Nancy Figueroa is requesting a refill authorization.  Brief assessment and rationale for refill: Approve    -Medication-related problems identified: Requires labs  Medication Therapy Plan: CDMR. labs(TSH)    Medication reconciliation completed: No   Comments:   Orders Placed This Encounter    levothyroxine (SYNTHROID) 112 MCG tablet      Requested Prescriptions   Signed Prescriptions Disp Refills    levothyroxine (SYNTHROID) 112 MCG tablet 90 tablet 0     Sig: TAKE 1 TABLET BY MOUTH BEFORE BREAKFAST       Endocrinology:  Hypothyroid Agents Failed - 10/21/2020  6:07 AM        Failed - Manual Review: FT4 is not required if last TSH is WNL.        Failed - TSH in normal range and within 360 days     TSH   Date Value Ref Range Status   09/10/2019 1.169 0.400 - 4.000 uIU/mL Final   09/10/2019 1.169 0.400 - 4.000 uIU/mL Final   03/22/2018 1.664 0.400 - 4.000 uIU/mL Final              Passed - Patient is at least 18 years old        Passed - Office Visit within last 12 months or future 90 days.     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    [facility] months [facility]    In 10 months Zachariah Gary MD  [place] [facility]                Passed - T4 free within 1080 days     Free T4   Date Value Ref Range Status   09/10/2019 1.34 0.71 - 1.51 ng/dL Final   04/13/2010 1.23 0.71 - 1.51 ng/dl Final   02/13/2009 1.07 0.71 - 1.51 ng/dl Final                  Appointments  past 12m or future 3m with PCP    Date Provider   Last Visit   3/18/2020 KAREEM Pantoja MD   Next Visit   Visit date not found KAREEM Pantoja MD   ED visits in past 90 days: 0     Note composed:10:31 AM 10/21/2020

## 2020-10-23 ENCOUNTER — PATIENT MESSAGE (OUTPATIENT)
Dept: ADMINISTRATIVE | Facility: OTHER | Age: 70
End: 2020-10-23

## 2020-10-26 ENCOUNTER — PATIENT MESSAGE (OUTPATIENT)
Dept: ADMINISTRATIVE | Facility: OTHER | Age: 70
End: 2020-10-26

## 2020-10-29 NOTE — PROGRESS NOTES
Subjective:         Patient ID: Nancy Figueroa is a 70 y.o. female.    Chief Complaint: No chief complaint on file.    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  The tumor was small approximately 0.4 cm and had a very low mitotic rate.  No lymph nodes were removed and the specimen in the was no obvious evidence of metastatic disease.  She presents to us for evaluation of possible adjuvant therapy.    The patient returns today for follow-up.  Overall she feels well.  Her most recent CT scans and not show evidence of metastatic disease.  She denies chest pain or shortness of breath.  There was a lung nodule that we are observing and she is asymptomatic of this.  She denies any CNS type symptoms or bone pain.  She denies abdominal pain, melena, hematochezia red blood per rectum.    Follow-up  Pertinent negatives include no abdominal pain, chest pain, chills, coughing, diaphoresis, fatigue, fever, headaches, joint swelling, nausea, neck pain, numbness, rash, sore throat, vomiting or weakness.     Review of Systems   Constitutional: Negative for activity change, appetite change, chills, diaphoresis, fatigue, fever and unexpected weight change.   HENT: Negative for facial swelling, mouth sores and sore throat.    Respiratory: Negative for apnea, cough, choking and shortness of breath.    Cardiovascular: Negative for chest pain and leg swelling.   Gastrointestinal: Negative for abdominal distention, abdominal pain, blood in stool, constipation, nausea, rectal pain and vomiting.   Endocrine: Negative for cold intolerance.   Genitourinary: Negative for difficulty urinating, hematuria, vaginal bleeding and vaginal discharge.   Musculoskeletal: Negative for back pain, joint swelling and neck pain.   Skin: Negative for color change, rash and wound.   Neurological: Negative for dizziness, weakness, numbness and headaches.   Hematological: Negative for  adenopathy. Does not bruise/bleed easily.   Psychiatric/Behavioral: Negative for agitation, confusion, decreased concentration and hallucinations. The patient is not hyperactive.          Past Medical History:   Past Medical History:   Diagnosis Date    Atherosclerotic heart disease of native coronary artery without angina pectoris 5/1/2017 4/17  Left main: normal  LAD: two diagonals  Less than 50%.  90% mid LAD Circumflex:  minimal disease, less than 20%. --  RCA: The vessel was large sized (dominant) with less than 20% stenosis.    Cataract     OU    Essential hypertension 10/18/2012    GERD (gastroesophageal reflux disease)     HTN (hypertension)     Hypothyroidism     NSTEMI (non-ST elevated myocardial infarction) 4/24/2017 4/2017    Stented coronary artery 7/26/2017 4/17 LAD-Synergy 3.0 x 28         Past Surgical History:   Past Surgical History:   Procedure Laterality Date    APPENDECTOMY      CARDIAC CATHETERIZATION  2017    LAD stent     COLONOSCOPY      HYSTERECTOMY      YANG with BSO    LAPAROSCOPIC SLEEVE GASTRECTOMY N/A 1/27/2020    Procedure: GASTRECTOMY, SLEEVE, LAPAROSCOPIC;  Surgeon: Cirilo Mathias MD;  [facility];  Service: General;  Laterality: N/A;        Family History:   Family History   Problem Relation Age of Onset    Cancer Mother 80        breast cancer    Dementia Mother     Cataracts Mother     Breast cancer Mother 70    Cancer Father         lung cancer    Cataracts Father     Cancer Paternal Grandmother         colon    Obesity Sister     Obesity Sister     Obesity Sister         Social History:   Social History     Tobacco Use    Smoking status: Never Smoker    Smokeless tobacco: Never Used   Substance Use Topics    Alcohol use: No     Frequency: Monthly or less     Drinks per session: 1 or 2     Binge frequency: Never        Allergies:   Review of patient's allergies indicates:  No Known Allergies     Medications:     Current Outpatient  Medications:     amLODIPine (NORVASC) 2.5 MG tablet, TAKE 1 TABLET(2.5 MG) BY MOUTH EVERY DAY, Disp: 90 tablet, Rfl: 4    aspirin (ECOTRIN) 81 MG EC tablet, Take 81 mg by mouth every evening. HOLD FOR 1 WEEK PRIOR TO SURGERY, Disp: , Rfl:     atorvastatin (LIPITOR) 10 MG tablet, Take 1 tablet (10 mg total) by mouth once daily., Disp: 90 tablet, Rfl: 4    calcium carbonate 650 mg calcium (1,625 mg) tablet, Take 2 tablets by mouth once daily., Disp: , Rfl:     carvediloL (COREG) 6.25 MG tablet, Take 1 tablet (6.25 mg total) by mouth 2 (two) times daily., Disp: 180 tablet, Rfl: 4    clopidogreL (PLAVIX) 75 mg tablet, TAKE 1 TABLET(75 MG) BY MOUTH EVERY DAY, Disp: 90 tablet, Rfl: 4    cyanocobalamin 500 MCG tablet, Take 500 mcg by mouth once daily., Disp: , Rfl:     levothyroxine (SYNTHROID) 112 MCG tablet, TAKE 1 TABLET BY MOUTH BEFORE BREAKFAST, Disp: 90 tablet, Rfl: 0    meclizine (ANTIVERT) 12.5 mg tablet, 1 tablet 3-4x daily as needed., Disp: 30 tablet, Rfl: 1    multivitamin (THERAGRAN) per tablet, Take 1 tablet by mouth once daily., Disp: , Rfl:      Objective:         Vitals:    10/30/20 1031   BP: (!) 155/84   Pulse: 78   Temp: 97.9 °F (36.6 °C)       Physical Exam  Vitals signs reviewed.   Constitutional:       General: She is not in acute distress.     Appearance: She is well-developed. She is not diaphoretic.   HENT:      Head: Normocephalic and atraumatic.   Eyes:      Pupils: Pupils are equal, round, and reactive to light.   Neck:      Musculoskeletal: Normal range of motion.      Trachea: No tracheal deviation.   Cardiovascular:      Rate and Rhythm: Normal rate and regular rhythm.      Heart sounds: Normal heart sounds. No murmur.   Pulmonary:      Effort: Pulmonary effort is normal. No respiratory distress.      Breath sounds: Normal breath sounds. No stridor. No wheezing or rales.   Abdominal:      General: Bowel sounds are normal. There is no distension.      Palpations: Abdomen is soft.  There is no mass.      Tenderness: There is no abdominal tenderness. There is no guarding or rebound.      Comments: The surgical wounds are healed.   Musculoskeletal: Normal range of motion.         General: No deformity.   Lymphadenopathy:      Cervical: No cervical adenopathy.   Skin:     Coloration: Skin is not pale.      Findings: No erythema or rash.   Neurological:      Mental Status: She is alert and oriented to person, place, and time.      Cranial Nerves: No cranial nerve deficit.      Coordination: Coordination normal.   Psychiatric:         Behavior: Behavior normal.         Thought Content: Thought content normal.         Judgment: Judgment normal.         There were no vitals filed for this visit.  No visits with results within 1 Week(s) from this visit.   Latest known visit with results is:   Lab Visit on 10/09/2020   Component Date Value Ref Range Status    Sodium 10/09/2020 143  136 - 145 mmol/L Final    Potassium 10/09/2020 4.6  3.5 - 5.1 mmol/L Final    Chloride 10/09/2020 108  95 - 110 mmol/L Final    CO2 10/09/2020 25  23 - 29 mmol/L Final    Glucose 10/09/2020 99  70 - 110 mg/dL Final    BUN 10/09/2020 16  8 - 23 mg/dL Final    Creatinine 10/09/2020 0.8  0.5 - 1.4 mg/dL Final    Calcium 10/09/2020 8.7  8.7 - 10.5 mg/dL Final    Total Protein 10/09/2020 6.9  6.0 - 8.4 g/dL Final    Albumin 10/09/2020 3.8  3.5 - 5.2 g/dL Final    Total Bilirubin 10/09/2020 1.9* 0.1 - 1.0 mg/dL Final    Comment: For infants and newborns, interpretation of results should be based  on gestational age, weight and in agreement with clinical  observations.  Premature Infant recommended reference ranges:  Up to 24 hours.............<8.0 mg/dL  Up to 48 hours............<12.0 mg/dL  3-5 days..................<15.0 mg/dL  6-29 days.................<15.0 mg/dL      Alkaline Phosphatase 10/09/2020 117  55 - 135 U/L Final    AST 10/09/2020 14  10 - 40 U/L Final    ALT 10/09/2020 13  10 - 44 U/L Final    Anion  Gap 10/09/2020 10  8 - 16 mmol/L Final    eGFR if African American 10/09/2020 >60  >60 mL/min/1.73 m^2 Final    eGFR if non African American 10/09/2020 >60  >60 mL/min/1.73 m^2 Final    Comment: Calculation used to obtain the estimated glomerular filtration  rate (eGFR) is the CKD-EPI equation.       WBC 10/09/2020 5.57  3.90 - 12.70 K/uL Final    RBC 10/09/2020 3.96* 4.00 - 5.40 M/uL Final    Hemoglobin 10/09/2020 11.4* 12.0 - 16.0 g/dL Final    Hematocrit 10/09/2020 34.7* 37.0 - 48.5 % Final    MCV 10/09/2020 88  82 - 98 fL Final    MCH 10/09/2020 28.8  27.0 - 31.0 pg Final    MCHC 10/09/2020 32.9  32.0 - 36.0 g/dL Final    RDW 10/09/2020 12.8  11.5 - 14.5 % Final    Platelets 10/09/2020 282  150 - 350 K/uL Final    MPV 10/09/2020 10.8  9.2 - 12.9 fL Final    Immature Granulocytes 10/09/2020 0.2  0.0 - 0.5 % Final    Gran # (ANC) 10/09/2020 3.6  1.8 - 7.7 K/uL Final    Immature Grans (Abs) 10/09/2020 0.01  0.00 - 0.04 K/uL Final    Comment: Mild elevation in immature granulocytes is non specific and   can be seen in a variety of conditions including stress response,   acute inflammation, trauma and pregnancy. Correlation with other   laboratory and clinical findings is essential.      Lymph # 10/09/2020 1.4  1.0 - 4.8 K/uL Final    Mono # 10/09/2020 0.5  0.3 - 1.0 K/uL Final    Eos # 10/09/2020 0.1  0.0 - 0.5 K/uL Final    Baso # 10/09/2020 0.04  0.00 - 0.20 K/uL Final    nRBC 10/09/2020 0  0 /100 WBC Final    Gran % 10/09/2020 64.1  38.0 - 73.0 % Final    Lymph % 10/09/2020 25.3  18.0 - 48.0 % Final    Mono % 10/09/2020 8.3  4.0 - 15.0 % Final    Eosinophil % 10/09/2020 1.4  0.0 - 8.0 % Final    Basophil % 10/09/2020 0.7  0.0 - 1.9 % Final    Differential Method 10/09/2020 Automated   Final       CT chest abd pelvis with contrast 10/9/20   Impression:     1. Postsurgical change of the stomach with no evidence for disease progression.  Unchanged right pulmonary nodule and no new  findings.  2. Moderate size hiatal hernia with partially intrathoracic stomach.  3. Coronary artery disease.    Assessment and plan     70-year-old with a gastrointestinal stromal tumor status post resection.    Patient has been follow up by Dr. Waite.      Plan:    Chest:  Fortunately the patient's risk from this is small as this was a very small tumor around 0.4 cm and had essentially a 0 mitotic rate.  Dr Waite has reviewed most recent labs with patient  as well as her scans for a few months ago. That time it was decided patient to be on surveillance.   I person has review her Ct chest abd pel scan completed on 10/9/20 on today visit.      Given above will continue [id number] visit.  Will repeat scan in 3 month with labs.       She is aware to contact me with any problems will certainly see her sooner we have encouraged her to follow with her other physicians as scheduled.

## 2020-10-30 ENCOUNTER — OFFICE VISIT (OUTPATIENT)
Dept: HEMATOLOGY/ONCOLOGY | Facility: CLINIC | Age: 70
End: 2020-10-30
Payer: MEDICARE

## 2020-10-30 VITALS
OXYGEN SATURATION: 98 % | HEART RATE: 78 BPM | SYSTOLIC BLOOD PRESSURE: 155 MMHG | WEIGHT: 217.94 LBS | BODY MASS INDEX: 38 KG/M2 | DIASTOLIC BLOOD PRESSURE: 84 MMHG | TEMPERATURE: 98 F

## 2020-10-30 DIAGNOSIS — I10 ESSENTIAL HYPERTENSION: ICD-10-CM

## 2020-10-30 DIAGNOSIS — C49.A2 GASTROINTESTINAL STROMAL TUMOR (GIST) OF STOMACH: Primary | ICD-10-CM

## 2020-10-30 PROCEDURE — 99999 PR PBB SHADOW E&M-EST. PATIENT-LVL IV: ICD-10-PCS | Mod: PBBFAC,,, | Performed by: INTERNAL MEDICINE

## 2020-10-30 PROCEDURE — 99999 PR PBB SHADOW E&M-EST. PATIENT-LVL IV: CPT | Mod: PBBFAC,,, | Performed by: INTERNAL MEDICINE

## 2020-10-30 PROCEDURE — 99214 OFFICE O/P EST MOD 30 MIN: CPT | Mod: PBBFAC,PN | Performed by: INTERNAL MEDICINE

## 2020-10-30 PROCEDURE — 99214 OFFICE O/P EST MOD 30 MIN: CPT | Mod: S$PBB,,, | Performed by: INTERNAL MEDICINE

## 2020-10-30 PROCEDURE — 99214 PR OFFICE/OUTPT VISIT, EST, LEVL IV, 30-39 MIN: ICD-10-PCS | Mod: S$PBB,,, | Performed by: INTERNAL MEDICINE

## 2020-11-12 ENCOUNTER — PATIENT OUTREACH (OUTPATIENT)
Dept: OTHER | Facility: OTHER | Age: 70
End: 2020-11-12

## 2020-11-19 ENCOUNTER — OFFICE VISIT (OUTPATIENT)
Dept: BARIATRICS | Facility: CLINIC | Age: 70
End: 2020-11-19
Payer: MEDICARE

## 2020-11-19 VITALS
WEIGHT: 213.19 LBS | SYSTOLIC BLOOD PRESSURE: 183 MMHG | TEMPERATURE: 98 F | RESPIRATION RATE: 16 BRPM | HEART RATE: 65 BPM | HEIGHT: 64 IN | BODY MASS INDEX: 36.4 KG/M2 | DIASTOLIC BLOOD PRESSURE: 81 MMHG

## 2020-11-19 DIAGNOSIS — E66.01 MORBID OBESITY WITH BMI OF 40.0-44.9, ADULT: ICD-10-CM

## 2020-11-19 DIAGNOSIS — D21.4 GIST (GASTROINTESTINAL STROMAL TUMOR), NON-MALIGNANT: ICD-10-CM

## 2020-11-19 DIAGNOSIS — E66.01 MORBID OBESITY: Primary | ICD-10-CM

## 2020-11-19 DIAGNOSIS — E78.5 DYSLIPIDEMIA: ICD-10-CM

## 2020-11-19 DIAGNOSIS — I10 ESSENTIAL HYPERTENSION: ICD-10-CM

## 2020-11-19 PROCEDURE — 99999 PR PBB SHADOW E&M-EST. PATIENT-LVL III: CPT | Mod: PBBFAC,,, | Performed by: SURGERY

## 2020-11-19 PROCEDURE — 99213 OFFICE O/P EST LOW 20 MIN: CPT | Mod: PBBFAC,PO | Performed by: SURGERY

## 2020-11-19 PROCEDURE — 99213 PR OFFICE/OUTPT VISIT, EST, LEVL III, 20-29 MIN: ICD-10-PCS | Mod: S$PBB,,, | Performed by: SURGERY

## 2020-11-19 PROCEDURE — 99213 OFFICE O/P EST LOW 20 MIN: CPT | Mod: S$PBB,,, | Performed by: SURGERY

## 2020-11-19 PROCEDURE — 99999 PR PBB SHADOW E&M-EST. PATIENT-LVL III: ICD-10-PCS | Mod: PBBFAC,,, | Performed by: SURGERY

## 2020-11-19 RX ORDER — TOPIRAMATE 25 MG/1
25 TABLET ORAL 2 TIMES DAILY
Qty: 60 TABLET | Refills: 2 | Status: SHIPPED | OUTPATIENT
Start: 2020-11-19 | End: 2021-05-14 | Stop reason: SDUPTHER

## 2020-11-19 NOTE — PROGRESS NOTES
Post Op Note    Surgery: gastric sleeve surgery  Date: 1/27/20  Initial weight: 262  Last weight: 218  Current weight: 213    Constipation: loose stools daily feels related with creamer   Reflux: none  Vomiting: none    Diet:  Breakfast:  yogurt  Lunch: 5 fried shrimp  Dinner: breaded meats usually with some vegetables   Snack: yogurt, struggling with sweets, stopped with chips    Exercise:  none    MVI: daily mvi, b12     Vitals:    11/19/20 0843   BP: (!) 183/81   Pulse: 65   Resp: 16   Temp: 98.2 °F (36.8 °C)       Body comp:  Fat Percent:  52 %  Fat Mass: 110.8 lb  FFM:  102.4 lb  TBW: 73.4 lb  TBW %:  34.4 %  BMR: 1476 kcal    PE:  NAD  RRR  Soft/nt/nd    Labs: pending    A/P: s/p sleeve     topamax to limit sweets      Counseling for patient:    Diet: no more carbs, will try topamax to curve appettite  Exercise: needs routine  Vitamins: continue 2 mvi, calcium, b12    Co morbidities:     1. Morbid obesity     2. Morbid obesity with BMI of 40.0-44.9, adult     3. Dyslipidemia     4. Essential hypertension     5. GIST (gastrointestinal stromal tumor), non-malignant         -All above: Evaluated, monitored, and treated with diet and exercise program.        : I met with the patient for 15 minutes and counseled her for over 50% of that time

## 2020-11-20 ENCOUNTER — PATIENT OUTREACH (OUTPATIENT)
Dept: OTHER | Facility: OTHER | Age: 70
End: 2020-11-20

## 2021-01-28 ENCOUNTER — HOSPITAL ENCOUNTER (OUTPATIENT)
Dept: RADIOLOGY | Facility: HOSPITAL | Age: 71
Discharge: HOME | End: 2021-01-28
Attending: INTERNAL MEDICINE
Payer: MEDICARE

## 2021-01-28 DIAGNOSIS — I10 ESSENTIAL HYPERTENSION: ICD-10-CM

## 2021-01-28 DIAGNOSIS — C49.A2 GASTROINTESTINAL STROMAL TUMOR (GIST) OF STOMACH: ICD-10-CM

## 2021-01-28 PROCEDURE — 25500020 PHARM REV CODE 255: Mod: PO | Performed by: INTERNAL MEDICINE

## 2021-01-28 PROCEDURE — 74177 CT CHEST ABDOMEN PELVIS WITH CONTRAST (XPD): ICD-10-PCS | Mod: 26,,, | Performed by: RADIOLOGY

## 2021-01-28 PROCEDURE — 74177 CT ABD & PELVIS W/CONTRAST: CPT | Mod: 26,,, | Performed by: RADIOLOGY

## 2021-01-28 PROCEDURE — 74177 CT ABD & PELVIS W/CONTRAST: CPT | Mod: TC,PO

## 2021-01-28 PROCEDURE — 71260 CT CHEST ABDOMEN PELVIS WITH CONTRAST (XPD): ICD-10-PCS | Mod: 26,,, | Performed by: RADIOLOGY

## 2021-01-28 PROCEDURE — 71260 CT THORAX DX C+: CPT | Mod: 26,,, | Performed by: RADIOLOGY

## 2021-01-28 PROCEDURE — A9698 NON-RAD CONTRAST MATERIALNOC: HCPCS | Mod: PO | Performed by: INTERNAL MEDICINE

## 2021-01-28 RX ADMIN — IOHEXOL 100 ML: 350 INJECTION, SOLUTION INTRAVENOUS at 12:01

## 2021-01-28 RX ADMIN — IOHEXOL 1000 ML: 12 SOLUTION ORAL at 12:01

## 2021-01-29 ENCOUNTER — OFFICE VISIT (OUTPATIENT)
Dept: HEMATOLOGY/ONCOLOGY | Facility: CLINIC | Age: 71
End: 2021-01-29
Payer: MEDICARE

## 2021-01-29 VITALS
SYSTOLIC BLOOD PRESSURE: 122 MMHG | WEIGHT: 220.44 LBS | TEMPERATURE: 98 F | HEIGHT: 64 IN | HEART RATE: 67 BPM | DIASTOLIC BLOOD PRESSURE: 76 MMHG | RESPIRATION RATE: 18 BRPM | OXYGEN SATURATION: 99 % | BODY MASS INDEX: 37.63 KG/M2

## 2021-01-29 DIAGNOSIS — C49.A2 GASTROINTESTINAL STROMAL TUMOR (GIST) OF STOMACH: Primary | ICD-10-CM

## 2021-01-29 PROCEDURE — 99213 PR OFFICE/OUTPT VISIT, EST, LEVL III, 20-29 MIN: ICD-10-PCS | Mod: S$PBB,,, | Performed by: INTERNAL MEDICINE

## 2021-01-29 PROCEDURE — 99999 PR PBB SHADOW E&M-EST. PATIENT-LVL IV: CPT | Mod: PBBFAC,,, | Performed by: INTERNAL MEDICINE

## 2021-01-29 PROCEDURE — 99213 OFFICE O/P EST LOW 20 MIN: CPT | Mod: S$PBB,,, | Performed by: INTERNAL MEDICINE

## 2021-01-29 PROCEDURE — 99999 PR PBB SHADOW E&M-EST. PATIENT-LVL IV: ICD-10-PCS | Mod: PBBFAC,,, | Performed by: INTERNAL MEDICINE

## 2021-01-29 PROCEDURE — 99214 OFFICE O/P EST MOD 30 MIN: CPT | Mod: PBBFAC,PN | Performed by: INTERNAL MEDICINE

## 2021-02-08 DIAGNOSIS — E03.9 HYPOTHYROIDISM, UNSPECIFIED TYPE: ICD-10-CM

## 2021-02-08 RX ORDER — LEVOTHYROXINE SODIUM 112 UG/1
112 TABLET ORAL
Qty: 90 TABLET | Refills: 0 | Status: SHIPPED | OUTPATIENT
Start: 2021-02-08 | End: 2021-04-30

## 2021-02-10 ENCOUNTER — LAB VISIT (OUTPATIENT)
Dept: LAB | Facility: HOSPITAL | Age: 71
End: 2021-02-10
Attending: FAMILY MEDICINE
Payer: MEDICARE

## 2021-02-10 DIAGNOSIS — E03.9 ACQUIRED HYPOTHYROIDISM: ICD-10-CM

## 2021-02-10 LAB
T4 FREE SERPL-MCNC: 1.26 NG/DL (ref 0.71–1.51)
TSH SERPL DL<=0.005 MIU/L-ACNC: 1.75 UIU/ML (ref 0.4–4)

## 2021-02-10 PROCEDURE — 84439 ASSAY OF FREE THYROXINE: CPT

## 2021-02-10 PROCEDURE — 84443 ASSAY THYROID STIM HORMONE: CPT

## 2021-02-10 PROCEDURE — 36415 COLL VENOUS BLD VENIPUNCTURE: CPT | Mod: PO

## 2021-03-19 ENCOUNTER — LAB VISIT (OUTPATIENT)
Dept: LAB | Facility: HOSPITAL | Age: 71
End: 2021-03-19
Attending: SURGERY
Payer: MEDICARE

## 2021-03-19 DIAGNOSIS — E61.1 IRON DEFICIENCY: ICD-10-CM

## 2021-03-19 DIAGNOSIS — E66.01 MORBID OBESITY: ICD-10-CM

## 2021-03-19 DIAGNOSIS — E53.8 B12 DEFICIENCY: ICD-10-CM

## 2021-03-19 LAB
ALBUMIN SERPL BCP-MCNC: 3.7 G/DL (ref 3.5–5.2)
ALP SERPL-CCNC: 143 U/L (ref 55–135)
ALT SERPL W/O P-5'-P-CCNC: 17 U/L (ref 10–44)
ANION GAP SERPL CALC-SCNC: 11 MMOL/L (ref 8–16)
AST SERPL-CCNC: 19 U/L (ref 10–40)
BASOPHILS # BLD AUTO: 0.06 K/UL (ref 0–0.2)
BASOPHILS NFR BLD: 1.1 % (ref 0–1.9)
BILIRUB SERPL-MCNC: 1.7 MG/DL (ref 0.1–1)
BUN SERPL-MCNC: 12 MG/DL (ref 8–23)
CALCIUM SERPL-MCNC: 9.2 MG/DL (ref 8.7–10.5)
CHLORIDE SERPL-SCNC: 109 MMOL/L (ref 95–110)
CHOLEST SERPL-MCNC: 155 MG/DL (ref 120–199)
CHOLEST/HDLC SERPL: 2.3 {RATIO} (ref 2–5)
CO2 SERPL-SCNC: 22 MMOL/L (ref 23–29)
CREAT SERPL-MCNC: 0.9 MG/DL (ref 0.5–1.4)
DIFFERENTIAL METHOD: ABNORMAL
EOSINOPHIL # BLD AUTO: 0.1 K/UL (ref 0–0.5)
EOSINOPHIL NFR BLD: 2.1 % (ref 0–8)
ERYTHROCYTE [DISTWIDTH] IN BLOOD BY AUTOMATED COUNT: 14.4 % (ref 11.5–14.5)
EST. GFR  (AFRICAN AMERICAN): >60 ML/MIN/1.73 M^2
EST. GFR  (NON AFRICAN AMERICAN): >60 ML/MIN/1.73 M^2
GLUCOSE SERPL-MCNC: 96 MG/DL (ref 70–110)
HCT VFR BLD AUTO: 40.9 % (ref 37–48.5)
HDLC SERPL-MCNC: 66 MG/DL (ref 40–75)
HDLC SERPL: 42.6 % (ref 20–50)
HGB BLD-MCNC: 12.9 G/DL (ref 12–16)
IMM GRANULOCYTES # BLD AUTO: 0.02 K/UL (ref 0–0.04)
IMM GRANULOCYTES NFR BLD AUTO: 0.4 % (ref 0–0.5)
IRON SERPL-MCNC: 76 UG/DL (ref 30–160)
LDLC SERPL CALC-MCNC: 70.6 MG/DL (ref 63–159)
LYMPHOCYTES # BLD AUTO: 1.7 K/UL (ref 1–4.8)
LYMPHOCYTES NFR BLD: 30.4 % (ref 18–48)
MCH RBC QN AUTO: 28 PG (ref 27–31)
MCHC RBC AUTO-ENTMCNC: 31.5 G/DL (ref 32–36)
MCV RBC AUTO: 89 FL (ref 82–98)
MONOCYTES # BLD AUTO: 0.5 K/UL (ref 0.3–1)
MONOCYTES NFR BLD: 8.5 % (ref 4–15)
NEUTROPHILS # BLD AUTO: 3.3 K/UL (ref 1.8–7.7)
NEUTROPHILS NFR BLD: 57.5 % (ref 38–73)
NONHDLC SERPL-MCNC: 89 MG/DL
NRBC BLD-RTO: 0 /100 WBC
PLATELET # BLD AUTO: 308 K/UL (ref 150–350)
PMV BLD AUTO: 11.2 FL (ref 9.2–12.9)
POTASSIUM SERPL-SCNC: 4.4 MMOL/L (ref 3.5–5.1)
PROT SERPL-MCNC: 7.5 G/DL (ref 6–8.4)
RBC # BLD AUTO: 4.6 M/UL (ref 4–5.4)
SATURATED IRON: 17 % (ref 20–50)
SODIUM SERPL-SCNC: 142 MMOL/L (ref 136–145)
TOTAL IRON BINDING CAPACITY: 454 UG/DL (ref 250–450)
TRANSFERRIN SERPL-MCNC: 307 MG/DL (ref 200–375)
TRIGL SERPL-MCNC: 92 MG/DL (ref 30–150)
VIT B12 SERPL-MCNC: 498 PG/ML (ref 210–950)
WBC # BLD AUTO: 5.66 K/UL (ref 3.9–12.7)

## 2021-03-19 PROCEDURE — 83540 ASSAY OF IRON: CPT | Performed by: SURGERY

## 2021-03-19 PROCEDURE — 36415 COLL VENOUS BLD VENIPUNCTURE: CPT | Mod: PO | Performed by: SURGERY

## 2021-03-19 PROCEDURE — 84425 ASSAY OF VITAMIN B-1: CPT | Performed by: SURGERY

## 2021-03-19 PROCEDURE — 80061 LIPID PANEL: CPT | Performed by: SURGERY

## 2021-03-19 PROCEDURE — 82607 VITAMIN B-12: CPT | Performed by: SURGERY

## 2021-03-19 PROCEDURE — 80053 COMPREHEN METABOLIC PANEL: CPT | Performed by: SURGERY

## 2021-03-19 PROCEDURE — 85025 COMPLETE CBC W/AUTO DIFF WBC: CPT | Performed by: SURGERY

## 2021-03-23 LAB — VIT B1 BLD-MCNC: 69 UG/L (ref 38–122)

## 2021-03-26 ENCOUNTER — OFFICE VISIT (OUTPATIENT)
Dept: BARIATRICS | Facility: CLINIC | Age: 71
End: 2021-03-26
Payer: MEDICARE

## 2021-03-26 VITALS
HEIGHT: 64 IN | RESPIRATION RATE: 16 BRPM | BODY MASS INDEX: 37.33 KG/M2 | TEMPERATURE: 98 F | SYSTOLIC BLOOD PRESSURE: 162 MMHG | WEIGHT: 218.63 LBS | DIASTOLIC BLOOD PRESSURE: 83 MMHG | HEART RATE: 71 BPM

## 2021-03-26 DIAGNOSIS — I10 ESSENTIAL HYPERTENSION: ICD-10-CM

## 2021-03-26 DIAGNOSIS — E66.01 MORBID OBESITY WITH BMI OF 40.0-44.9, ADULT: ICD-10-CM

## 2021-03-26 DIAGNOSIS — E66.01 MORBID OBESITY: Primary | ICD-10-CM

## 2021-03-26 DIAGNOSIS — E78.5 DYSLIPIDEMIA: ICD-10-CM

## 2021-03-26 PROCEDURE — 99999 PR PBB SHADOW E&M-EST. PATIENT-LVL III: CPT | Mod: PBBFAC,,, | Performed by: SURGERY

## 2021-03-26 PROCEDURE — 99213 OFFICE O/P EST LOW 20 MIN: CPT | Mod: S$PBB,,, | Performed by: SURGERY

## 2021-03-26 PROCEDURE — 99999 PR PBB SHADOW E&M-EST. PATIENT-LVL III: ICD-10-PCS | Mod: PBBFAC,,, | Performed by: SURGERY

## 2021-03-26 PROCEDURE — 99213 PR OFFICE/OUTPT VISIT, EST, LEVL III, 20-29 MIN: ICD-10-PCS | Mod: S$PBB,,, | Performed by: SURGERY

## 2021-03-26 PROCEDURE — 99213 OFFICE O/P EST LOW 20 MIN: CPT | Mod: PBBFAC,PO | Performed by: SURGERY

## 2021-04-06 ENCOUNTER — PATIENT MESSAGE (OUTPATIENT)
Dept: ADMINISTRATIVE | Facility: HOSPITAL | Age: 71
End: 2021-04-06

## 2021-04-28 DIAGNOSIS — E03.9 HYPOTHYROIDISM, UNSPECIFIED TYPE: ICD-10-CM

## 2021-04-30 DIAGNOSIS — E03.9 HYPOTHYROIDISM, UNSPECIFIED TYPE: ICD-10-CM

## 2021-04-30 RX ORDER — LEVOTHYROXINE SODIUM 112 UG/1
TABLET ORAL
Qty: 90 TABLET | Refills: 0 | OUTPATIENT
Start: 2021-04-30

## 2021-04-30 RX ORDER — LEVOTHYROXINE SODIUM 112 UG/1
TABLET ORAL
Qty: 90 TABLET | Refills: 0 | Status: SHIPPED | OUTPATIENT
Start: 2021-04-30 | End: 2021-06-22 | Stop reason: SDUPTHER

## 2021-05-06 ENCOUNTER — PATIENT MESSAGE (OUTPATIENT)
Dept: RESEARCH | Facility: HOSPITAL | Age: 71
End: 2021-05-06

## 2021-05-12 ENCOUNTER — PATIENT MESSAGE (OUTPATIENT)
Dept: BARIATRICS | Facility: CLINIC | Age: 71
End: 2021-05-12

## 2021-05-14 ENCOUNTER — OFFICE VISIT (OUTPATIENT)
Dept: BARIATRICS | Facility: CLINIC | Age: 71
End: 2021-05-14
Payer: MEDICARE

## 2021-05-14 VITALS
HEART RATE: 71 BPM | TEMPERATURE: 98 F | SYSTOLIC BLOOD PRESSURE: 147 MMHG | HEIGHT: 64 IN | RESPIRATION RATE: 16 BRPM | DIASTOLIC BLOOD PRESSURE: 76 MMHG | BODY MASS INDEX: 36.57 KG/M2 | WEIGHT: 214.19 LBS

## 2021-05-14 DIAGNOSIS — E66.01 MORBID OBESITY: Primary | ICD-10-CM

## 2021-05-14 DIAGNOSIS — I10 ESSENTIAL HYPERTENSION: ICD-10-CM

## 2021-05-14 DIAGNOSIS — E66.01 MORBID OBESITY WITH BMI OF 40.0-44.9, ADULT: ICD-10-CM

## 2021-05-14 PROCEDURE — 99213 OFFICE O/P EST LOW 20 MIN: CPT | Mod: S$PBB,,, | Performed by: SURGERY

## 2021-05-14 PROCEDURE — 99213 PR OFFICE/OUTPT VISIT, EST, LEVL III, 20-29 MIN: ICD-10-PCS | Mod: S$PBB,,, | Performed by: SURGERY

## 2021-05-14 PROCEDURE — 99999 PR PBB SHADOW E&M-EST. PATIENT-LVL III: CPT | Mod: PBBFAC,,, | Performed by: SURGERY

## 2021-05-14 PROCEDURE — 99999 PR PBB SHADOW E&M-EST. PATIENT-LVL III: ICD-10-PCS | Mod: PBBFAC,,, | Performed by: SURGERY

## 2021-05-14 PROCEDURE — 99213 OFFICE O/P EST LOW 20 MIN: CPT | Mod: PBBFAC,PO | Performed by: SURGERY

## 2021-05-14 RX ORDER — TOPIRAMATE 25 MG/1
25 TABLET ORAL 2 TIMES DAILY
Qty: 60 TABLET | Refills: 2 | Status: SHIPPED | OUTPATIENT
Start: 2021-05-14 | End: 2021-08-13

## 2021-06-02 ENCOUNTER — TELEPHONE (OUTPATIENT)
Dept: HEMATOLOGY/ONCOLOGY | Facility: CLINIC | Age: 71
End: 2021-06-02

## 2021-06-16 ENCOUNTER — TELEPHONE (OUTPATIENT)
Dept: FAMILY MEDICINE | Facility: CLINIC | Age: 71
End: 2021-06-16

## 2021-06-22 ENCOUNTER — OFFICE VISIT (OUTPATIENT)
Dept: FAMILY MEDICINE | Facility: CLINIC | Age: 71
End: 2021-06-22
Payer: MEDICARE

## 2021-06-22 VITALS
SYSTOLIC BLOOD PRESSURE: 128 MMHG | OXYGEN SATURATION: 98 % | HEIGHT: 63 IN | BODY MASS INDEX: 38.36 KG/M2 | HEART RATE: 80 BPM | WEIGHT: 216.5 LBS | TEMPERATURE: 98 F | DIASTOLIC BLOOD PRESSURE: 72 MMHG

## 2021-06-22 DIAGNOSIS — E03.9 HYPOTHYROIDISM, UNSPECIFIED TYPE: ICD-10-CM

## 2021-06-22 DIAGNOSIS — E03.9 ACQUIRED HYPOTHYROIDISM: ICD-10-CM

## 2021-06-22 DIAGNOSIS — B07.9 WART ON THUMB: ICD-10-CM

## 2021-06-22 DIAGNOSIS — Z95.5 STENTED CORONARY ARTERY: Chronic | ICD-10-CM

## 2021-06-22 DIAGNOSIS — E66.01 MORBID OBESITY: ICD-10-CM

## 2021-06-22 DIAGNOSIS — I10 ESSENTIAL HYPERTENSION: Primary | ICD-10-CM

## 2021-06-22 DIAGNOSIS — I25.2 HISTORY OF NON-ST ELEVATION MYOCARDIAL INFARCTION (NSTEMI): ICD-10-CM

## 2021-06-22 DIAGNOSIS — C49.A2 GASTROINTESTINAL STROMAL TUMOR (GIST) OF STOMACH: ICD-10-CM

## 2021-06-22 DIAGNOSIS — E78.5 DYSLIPIDEMIA: ICD-10-CM

## 2021-06-22 DIAGNOSIS — Z78.0 POST-MENOPAUSAL: ICD-10-CM

## 2021-06-22 PROCEDURE — 99214 OFFICE O/P EST MOD 30 MIN: CPT | Mod: PBBFAC,PO | Performed by: FAMILY MEDICINE

## 2021-06-22 PROCEDURE — 99999 PR PBB SHADOW E&M-EST. PATIENT-LVL IV: CPT | Mod: PBBFAC,,, | Performed by: FAMILY MEDICINE

## 2021-06-22 PROCEDURE — 99214 OFFICE O/P EST MOD 30 MIN: CPT | Mod: S$PBB,,, | Performed by: FAMILY MEDICINE

## 2021-06-22 PROCEDURE — 99999 PR PBB SHADOW E&M-EST. PATIENT-LVL IV: ICD-10-PCS | Mod: PBBFAC,,, | Performed by: FAMILY MEDICINE

## 2021-06-22 PROCEDURE — 99214 PR OFFICE/OUTPT VISIT, EST, LEVL IV, 30-39 MIN: ICD-10-PCS | Mod: S$PBB,,, | Performed by: FAMILY MEDICINE

## 2021-06-22 RX ORDER — LEVOTHYROXINE SODIUM 112 UG/1
TABLET ORAL
Qty: 90 TABLET | Refills: 3 | Status: SHIPPED | OUTPATIENT
Start: 2021-06-22 | End: 2022-08-02 | Stop reason: SDUPTHER

## 2021-06-24 ENCOUNTER — HOSPITAL ENCOUNTER (OUTPATIENT)
Dept: RADIOLOGY | Facility: HOSPITAL | Age: 71
Discharge: HOME | End: 2021-06-24
Attending: FAMILY MEDICINE
Payer: MEDICARE

## 2021-06-24 DIAGNOSIS — Z78.0 POST-MENOPAUSAL: ICD-10-CM

## 2021-06-24 PROCEDURE — 77080 DXA BONE DENSITY AXIAL: CPT | Mod: TC,PO

## 2021-06-24 PROCEDURE — 77080 DEXA BONE DENSITY SPINE HIP: ICD-10-PCS | Mod: 26,,, | Performed by: RADIOLOGY

## 2021-06-24 PROCEDURE — 77080 DXA BONE DENSITY AXIAL: CPT | Mod: 26,,, | Performed by: RADIOLOGY

## 2021-07-13 ENCOUNTER — PATIENT MESSAGE (OUTPATIENT)
Dept: FAMILY MEDICINE | Facility: CLINIC | Age: 71
End: 2021-07-13

## 2021-07-14 ENCOUNTER — PATIENT MESSAGE (OUTPATIENT)
Dept: FAMILY MEDICINE | Facility: CLINIC | Age: 71
End: 2021-07-14

## 2021-07-23 ENCOUNTER — HOSPITAL ENCOUNTER (OUTPATIENT)
Dept: RADIOLOGY | Facility: HOSPITAL | Age: 71
Discharge: HOME | End: 2021-07-23
Attending: INTERNAL MEDICINE
Payer: MEDICARE

## 2021-07-23 DIAGNOSIS — C49.A2 GASTROINTESTINAL STROMAL TUMOR (GIST) OF STOMACH: ICD-10-CM

## 2021-07-23 PROCEDURE — 71250 CT CHEST ABDOMEN WITHOUT CONTRAST (XPD): ICD-10-PCS | Mod: 26,,, | Performed by: RADIOLOGY

## 2021-07-23 PROCEDURE — 74150 CT CHEST ABDOMEN WITHOUT CONTRAST (XPD): ICD-10-PCS | Mod: 26,,, | Performed by: RADIOLOGY

## 2021-07-23 PROCEDURE — A9698 NON-RAD CONTRAST MATERIALNOC: HCPCS | Mod: PO | Performed by: INTERNAL MEDICINE

## 2021-07-23 PROCEDURE — 25500020 PHARM REV CODE 255: Mod: PO | Performed by: INTERNAL MEDICINE

## 2021-07-23 PROCEDURE — 71250 CT THORAX DX C-: CPT | Mod: 26,,, | Performed by: RADIOLOGY

## 2021-07-23 PROCEDURE — 74150 CT ABDOMEN W/O CONTRAST: CPT | Mod: TC,PO

## 2021-07-23 PROCEDURE — 74150 CT ABDOMEN W/O CONTRAST: CPT | Mod: 26,,, | Performed by: RADIOLOGY

## 2021-07-23 RX ADMIN — IOHEXOL 500 ML: 12 SOLUTION ORAL at 09:07

## 2021-07-26 ENCOUNTER — LAB VISIT (OUTPATIENT)
Dept: LAB | Facility: HOSPITAL | Age: 71
End: 2021-07-26
Attending: INTERNAL MEDICINE
Payer: MEDICARE

## 2021-07-26 DIAGNOSIS — C49.A2 GASTROINTESTINAL STROMAL TUMOR (GIST) OF STOMACH: ICD-10-CM

## 2021-07-26 LAB
ALBUMIN SERPL BCP-MCNC: 3.7 G/DL (ref 3.5–5.2)
ALP SERPL-CCNC: 110 U/L (ref 55–135)
ALT SERPL W/O P-5'-P-CCNC: 15 U/L (ref 10–44)
ANION GAP SERPL CALC-SCNC: 10 MMOL/L (ref 8–16)
AST SERPL-CCNC: 16 U/L (ref 10–40)
BASOPHILS # BLD AUTO: 0.04 K/UL (ref 0–0.2)
BASOPHILS NFR BLD: 0.7 % (ref 0–1.9)
BILIRUB SERPL-MCNC: 1.1 MG/DL (ref 0.1–1)
BUN SERPL-MCNC: 13 MG/DL (ref 8–23)
CALCIUM SERPL-MCNC: 9.4 MG/DL (ref 8.7–10.5)
CHLORIDE SERPL-SCNC: 113 MMOL/L (ref 95–110)
CO2 SERPL-SCNC: 21 MMOL/L (ref 23–29)
CREAT SERPL-MCNC: 0.8 MG/DL (ref 0.5–1.4)
DIFFERENTIAL METHOD: ABNORMAL
EOSINOPHIL # BLD AUTO: 0.2 K/UL (ref 0–0.5)
EOSINOPHIL NFR BLD: 2.8 % (ref 0–8)
ERYTHROCYTE [DISTWIDTH] IN BLOOD BY AUTOMATED COUNT: 13.6 % (ref 11.5–14.5)
EST. GFR  (AFRICAN AMERICAN): >60 ML/MIN/1.73 M^2
EST. GFR  (NON AFRICAN AMERICAN): >60 ML/MIN/1.73 M^2
GLUCOSE SERPL-MCNC: 105 MG/DL (ref 70–110)
HCT VFR BLD AUTO: 36.2 % (ref 37–48.5)
HGB BLD-MCNC: 11.6 G/DL (ref 12–16)
IMM GRANULOCYTES # BLD AUTO: 0.01 K/UL (ref 0–0.04)
IMM GRANULOCYTES NFR BLD AUTO: 0.2 % (ref 0–0.5)
LDH SERPL L TO P-CCNC: 165 U/L (ref 110–260)
LYMPHOCYTES # BLD AUTO: 1.8 K/UL (ref 1–4.8)
LYMPHOCYTES NFR BLD: 31.2 % (ref 18–48)
MCH RBC QN AUTO: 28.2 PG (ref 27–31)
MCHC RBC AUTO-ENTMCNC: 32 G/DL (ref 32–36)
MCV RBC AUTO: 88 FL (ref 82–98)
MONOCYTES # BLD AUTO: 0.5 K/UL (ref 0.3–1)
MONOCYTES NFR BLD: 8.7 % (ref 4–15)
NEUTROPHILS # BLD AUTO: 3.2 K/UL (ref 1.8–7.7)
NEUTROPHILS NFR BLD: 56.4 % (ref 38–73)
NRBC BLD-RTO: 0 /100 WBC
PLATELET # BLD AUTO: 267 K/UL (ref 150–450)
PMV BLD AUTO: 11.1 FL (ref 9.2–12.9)
POTASSIUM SERPL-SCNC: 3.9 MMOL/L (ref 3.5–5.1)
PROT SERPL-MCNC: 7.2 G/DL (ref 6–8.4)
RBC # BLD AUTO: 4.11 M/UL (ref 4–5.4)
SODIUM SERPL-SCNC: 144 MMOL/L (ref 136–145)
WBC # BLD AUTO: 5.65 K/UL (ref 3.9–12.7)

## 2021-07-26 PROCEDURE — 36415 COLL VENOUS BLD VENIPUNCTURE: CPT | Mod: PN | Performed by: INTERNAL MEDICINE

## 2021-07-26 PROCEDURE — 80053 COMPREHEN METABOLIC PANEL: CPT | Mod: PN | Performed by: INTERNAL MEDICINE

## 2021-07-26 PROCEDURE — 83615 LACTATE (LD) (LDH) ENZYME: CPT | Mod: PN | Performed by: INTERNAL MEDICINE

## 2021-07-26 PROCEDURE — 85025 COMPLETE CBC W/AUTO DIFF WBC: CPT | Mod: PN | Performed by: INTERNAL MEDICINE

## 2021-08-03 RX ORDER — PREDNISOLONE ACETATE 10 MG/ML
SUSPENSION/ DROPS OPHTHALMIC
COMMUNITY
Start: 2021-07-14 | End: 2022-04-25

## 2021-08-03 RX ORDER — OFLOXACIN 3 MG/ML
SOLUTION/ DROPS OPHTHALMIC
COMMUNITY
Start: 2021-07-14 | End: 2022-04-25

## 2021-08-03 RX ORDER — AMLODIPINE BESYLATE 2.5 MG/1
2.5 TABLET ORAL DAILY
COMMUNITY
Start: 2021-07-18 | End: 2021-08-13

## 2021-08-04 ENCOUNTER — OFFICE VISIT (OUTPATIENT)
Dept: HEMATOLOGY/ONCOLOGY | Facility: CLINIC | Age: 71
End: 2021-08-04
Payer: MEDICARE

## 2021-08-04 VITALS
WEIGHT: 216.38 LBS | OXYGEN SATURATION: 96 % | DIASTOLIC BLOOD PRESSURE: 88 MMHG | SYSTOLIC BLOOD PRESSURE: 154 MMHG | BODY MASS INDEX: 38.33 KG/M2 | HEART RATE: 56 BPM | TEMPERATURE: 98 F

## 2021-08-04 DIAGNOSIS — C16.2 MALIGNANT NEOPLASM OF BODY OF STOMACH: ICD-10-CM

## 2021-08-04 DIAGNOSIS — C26.9 MALIGNANT NEOPLASM OF ILL-DEFINED SITES WITHIN THE DIGESTIVE SYSTEM: ICD-10-CM

## 2021-08-04 DIAGNOSIS — R91.1 PULMONARY NODULE: Primary | ICD-10-CM

## 2021-08-04 PROCEDURE — 99214 PR OFFICE/OUTPT VISIT, EST, LEVL IV, 30-39 MIN: ICD-10-PCS | Mod: S$PBB,,, | Performed by: INTERNAL MEDICINE

## 2021-08-04 PROCEDURE — 99999 PR PBB SHADOW E&M-EST. PATIENT-LVL V: ICD-10-PCS | Mod: PBBFAC,,, | Performed by: INTERNAL MEDICINE

## 2021-08-04 PROCEDURE — 99999 PR PBB SHADOW E&M-EST. PATIENT-LVL V: CPT | Mod: PBBFAC,,, | Performed by: INTERNAL MEDICINE

## 2021-08-04 PROCEDURE — 99215 OFFICE O/P EST HI 40 MIN: CPT | Mod: PBBFAC,PN | Performed by: INTERNAL MEDICINE

## 2021-08-04 PROCEDURE — 99214 OFFICE O/P EST MOD 30 MIN: CPT | Mod: S$PBB,,, | Performed by: INTERNAL MEDICINE

## 2021-08-10 ENCOUNTER — CLINICAL SUPPORT (OUTPATIENT)
Dept: CARDIOLOGY | Facility: HOSPITAL | Age: 71
End: 2021-08-10
Attending: INTERNAL MEDICINE
Payer: MEDICARE

## 2021-08-10 VITALS — WEIGHT: 216 LBS | HEIGHT: 63 IN | BODY MASS INDEX: 38.27 KG/M2

## 2021-08-10 DIAGNOSIS — I25.10 ATHEROSCLEROSIS OF NATIVE CORONARY ARTERY OF NATIVE HEART WITHOUT ANGINA PECTORIS: ICD-10-CM

## 2021-08-10 DIAGNOSIS — Z95.5 STENTED CORONARY ARTERY: ICD-10-CM

## 2021-08-10 DIAGNOSIS — I10 ESSENTIAL HYPERTENSION: ICD-10-CM

## 2021-08-10 DIAGNOSIS — E78.5 DYSLIPIDEMIA: ICD-10-CM

## 2021-08-10 LAB
ASCENDING AORTA: 3.14 CM
AV INDEX (PROSTH): 0.75
AV MEAN GRADIENT: 6 MMHG
AV PEAK GRADIENT: 12 MMHG
AV VALVE AREA: 1.92 CM2
AV VELOCITY RATIO: 0.79
BSA FOR ECHO PROCEDURE: 2.09 M2
CV ECHO LV RWT: 0.51 CM
DOP CALC AO PEAK VEL: 1.75 M/S
DOP CALC AO VTI: 42.84 CM
DOP CALC LVOT AREA: 2.5 CM2
DOP CALC LVOT DIAMETER: 1.8 CM
DOP CALC LVOT PEAK VEL: 1.38 M/S
DOP CALC LVOT STROKE VOLUME: 82.13 CM3
DOP CALCLVOT PEAK VEL VTI: 32.29 CM
E WAVE DECELERATION TIME: 237.68 MSEC
E/A RATIO: 1.19
E/E' RATIO: 11.87 M/S
ECHO LV POSTERIOR WALL: 1.11 CM (ref 0.6–1.1)
EJECTION FRACTION: 55 %
FRACTIONAL SHORTENING: 46 % (ref 28–44)
INTERVENTRICULAR SEPTUM: 0.78 CM (ref 0.6–1.1)
IVRT: 93.24 MSEC
LA MAJOR: 5.07 CM
LA MINOR: 5.15 CM
LA WIDTH: 4.29 CM
LEFT ATRIUM SIZE: 4.07 CM
LEFT ATRIUM VOLUME INDEX: 37.9 ML/M2
LEFT ATRIUM VOLUME: 75.83 CM3
LEFT INTERNAL DIMENSION IN SYSTOLE: 2.35 CM (ref 2.1–4)
LEFT VENTRICLE DIASTOLIC VOLUME INDEX: 43.21 ML/M2
LEFT VENTRICLE DIASTOLIC VOLUME: 86.41 ML
LEFT VENTRICLE MASS INDEX: 68 G/M2
LEFT VENTRICLE SYSTOLIC VOLUME INDEX: 9.5 ML/M2
LEFT VENTRICLE SYSTOLIC VOLUME: 19.04 ML
LEFT VENTRICULAR INTERNAL DIMENSION IN DIASTOLE: 4.37 CM (ref 3.5–6)
LEFT VENTRICULAR MASS: 135.27 G
LV LATERAL E/E' RATIO: 11.13 M/S
LV SEPTAL E/E' RATIO: 12.71 M/S
MV A" WAVE DURATION": 9.71 MSEC
MV PEAK A VEL: 0.75 M/S
MV PEAK E VEL: 0.89 M/S
MV STENOSIS PRESSURE HALF TIME: 68.93 MS
MV VALVE AREA P 1/2 METHOD: 3.19 CM2
PISA MRMAX VEL: 0.04 M/S
PISA TR MAX VEL: 2.73 M/S
PULM VEIN S/D RATIO: 0.87
PV PEAK D VEL: 0.61 M/S
PV PEAK S VEL: 0.53 M/S
RA MAJOR: 4.8 CM
RA PRESSURE: 3 MMHG
RA WIDTH: 3.75 CM
RIGHT VENTRICULAR END-DIASTOLIC DIMENSION: 4.44 CM
RV TISSUE DOPPLER FREE WALL SYSTOLIC VELOCITY 1 (APICAL 4 CHAMBER VIEW): 10.4 CM/S
SINUS: 2.48 CM
STJ: 2.37 CM
TDI LATERAL: 0.08 M/S
TDI SEPTAL: 0.07 M/S
TDI: 0.08 M/S
TR MAX PG: 30 MMHG
TRICUSPID ANNULAR PLANE SYSTOLIC EXCURSION: 2.04 CM
TV REST PULMONARY ARTERY PRESSURE: 33 MMHG

## 2021-08-10 PROCEDURE — 93306 TTE W/DOPPLER COMPLETE: CPT | Mod: 26,,, | Performed by: INTERNAL MEDICINE

## 2021-08-10 PROCEDURE — 93306 ECHO (CUPID ONLY): ICD-10-PCS | Mod: 26,,, | Performed by: INTERNAL MEDICINE

## 2021-08-10 PROCEDURE — 93306 TTE W/DOPPLER COMPLETE: CPT | Mod: PO

## 2021-08-12 ENCOUNTER — PATIENT OUTREACH (OUTPATIENT)
Dept: ADMINISTRATIVE | Facility: OTHER | Age: 71
End: 2021-08-12

## 2021-08-13 ENCOUNTER — OFFICE VISIT (OUTPATIENT)
Dept: BARIATRICS | Facility: CLINIC | Age: 71
End: 2021-08-13
Payer: MEDICARE

## 2021-08-13 VITALS
RESPIRATION RATE: 16 BRPM | TEMPERATURE: 97 F | BODY MASS INDEX: 36.54 KG/M2 | WEIGHT: 214 LBS | HEIGHT: 64 IN | DIASTOLIC BLOOD PRESSURE: 95 MMHG | SYSTOLIC BLOOD PRESSURE: 177 MMHG | HEART RATE: 69 BPM

## 2021-08-13 DIAGNOSIS — C49.A2 GASTROINTESTINAL STROMAL TUMOR (GIST) OF STOMACH: ICD-10-CM

## 2021-08-13 DIAGNOSIS — R91.8 PULMONARY NODULES/LESIONS, MULTIPLE: ICD-10-CM

## 2021-08-13 DIAGNOSIS — K44.9 HIATAL HERNIA: ICD-10-CM

## 2021-08-13 DIAGNOSIS — D21.4 GIST (GASTROINTESTINAL STROMAL TUMOR), NON-MALIGNANT: Primary | ICD-10-CM

## 2021-08-13 PROCEDURE — 99214 OFFICE O/P EST MOD 30 MIN: CPT | Mod: S$PBB,,, | Performed by: SURGERY

## 2021-08-13 PROCEDURE — 99999 PR PBB SHADOW E&M-EST. PATIENT-LVL IV: CPT | Mod: PBBFAC,,, | Performed by: SURGERY

## 2021-08-13 PROCEDURE — 99214 OFFICE O/P EST MOD 30 MIN: CPT | Mod: PBBFAC,PO | Performed by: SURGERY

## 2021-08-13 PROCEDURE — 99214 PR OFFICE/OUTPT VISIT, EST, LEVL IV, 30-39 MIN: ICD-10-PCS | Mod: S$PBB,,, | Performed by: SURGERY

## 2021-08-13 PROCEDURE — 99999 PR PBB SHADOW E&M-EST. PATIENT-LVL IV: ICD-10-PCS | Mod: PBBFAC,,, | Performed by: SURGERY

## 2021-08-13 RX ORDER — TOPIRAMATE 25 MG/1
25 TABLET ORAL 2 TIMES DAILY
Qty: 180 TABLET | Refills: 0 | Status: SHIPPED | OUTPATIENT
Start: 2021-08-13 | End: 2021-11-12

## 2021-08-17 ENCOUNTER — OFFICE VISIT (OUTPATIENT)
Dept: CARDIOLOGY | Facility: CLINIC | Age: 71
End: 2021-08-17
Payer: MEDICARE

## 2021-08-17 VITALS
SYSTOLIC BLOOD PRESSURE: 126 MMHG | DIASTOLIC BLOOD PRESSURE: 83 MMHG | WEIGHT: 216.5 LBS | HEART RATE: 72 BPM | HEIGHT: 60 IN | BODY MASS INDEX: 42.5 KG/M2

## 2021-08-17 DIAGNOSIS — I10 ESSENTIAL HYPERTENSION: ICD-10-CM

## 2021-08-17 DIAGNOSIS — I25.10 ATHEROSCLEROSIS OF NATIVE CORONARY ARTERY OF NATIVE HEART WITHOUT ANGINA PECTORIS: Primary | ICD-10-CM

## 2021-08-17 DIAGNOSIS — I25.2 HISTORY OF NON-ST ELEVATION MYOCARDIAL INFARCTION (NSTEMI): ICD-10-CM

## 2021-08-17 DIAGNOSIS — E78.5 DYSLIPIDEMIA: ICD-10-CM

## 2021-08-17 DIAGNOSIS — Z95.5 STENTED CORONARY ARTERY: Chronic | ICD-10-CM

## 2021-08-17 PROCEDURE — 99999 PR PBB SHADOW E&M-EST. PATIENT-LVL III: CPT | Mod: PBBFAC,,, | Performed by: INTERNAL MEDICINE

## 2021-08-17 PROCEDURE — 99214 PR OFFICE/OUTPT VISIT, EST, LEVL IV, 30-39 MIN: ICD-10-PCS | Mod: S$PBB,,, | Performed by: INTERNAL MEDICINE

## 2021-08-17 PROCEDURE — 99213 OFFICE O/P EST LOW 20 MIN: CPT | Mod: PBBFAC,PO | Performed by: INTERNAL MEDICINE

## 2021-08-17 PROCEDURE — 99999 PR PBB SHADOW E&M-EST. PATIENT-LVL III: ICD-10-PCS | Mod: PBBFAC,,, | Performed by: INTERNAL MEDICINE

## 2021-08-17 PROCEDURE — 99214 OFFICE O/P EST MOD 30 MIN: CPT | Mod: S$PBB,,, | Performed by: INTERNAL MEDICINE

## 2021-08-17 RX ORDER — AMLODIPINE BESYLATE 5 MG/1
5 TABLET ORAL DAILY
Qty: 90 TABLET | Refills: 3 | Status: SHIPPED | OUTPATIENT
Start: 2021-08-17 | End: 2022-08-04

## 2021-08-17 RX ORDER — ATORVASTATIN CALCIUM 10 MG/1
10 TABLET, FILM COATED ORAL DAILY
Qty: 90 TABLET | Refills: 4 | Status: SHIPPED | OUTPATIENT
Start: 2021-08-17 | End: 2022-08-05

## 2021-08-17 RX ORDER — CLOPIDOGREL BISULFATE 75 MG/1
75 TABLET ORAL DAILY
Qty: 90 TABLET | Refills: 4 | Status: SHIPPED | OUTPATIENT
Start: 2021-08-17 | End: 2021-11-02

## 2021-08-17 RX ORDER — CARVEDILOL 6.25 MG/1
6.25 TABLET ORAL 2 TIMES DAILY
Qty: 180 TABLET | Refills: 4 | Status: SHIPPED | OUTPATIENT
Start: 2021-08-17 | End: 2022-10-31

## 2021-08-18 ENCOUNTER — HOSPITAL ENCOUNTER (OUTPATIENT)
Dept: RADIOLOGY | Facility: HOSPITAL | Age: 71
Discharge: HOME | End: 2021-08-18
Attending: INTERNAL MEDICINE
Payer: MEDICARE

## 2021-08-18 DIAGNOSIS — C16.2 MALIGNANT NEOPLASM OF BODY OF STOMACH: ICD-10-CM

## 2021-08-18 DIAGNOSIS — C26.9 MALIGNANT NEOPLASM OF ILL-DEFINED SITES WITHIN THE DIGESTIVE SYSTEM: ICD-10-CM

## 2021-08-18 LAB — GLUCOSE SERPL-MCNC: 115 MG/DL (ref 70–110)

## 2021-08-18 PROCEDURE — 78815 NM PET CT ROUTINE: ICD-10-PCS | Mod: 26,PI,, | Performed by: RADIOLOGY

## 2021-08-18 PROCEDURE — 78815 PET IMAGE W/CT SKULL-THIGH: CPT | Mod: TC,PN

## 2021-08-18 PROCEDURE — 78815 PET IMAGE W/CT SKULL-THIGH: CPT | Mod: 26,PI,, | Performed by: RADIOLOGY

## 2021-08-18 PROCEDURE — A9552 F18 FDG: HCPCS | Mod: PN

## 2021-08-19 ENCOUNTER — LAB VISIT (OUTPATIENT)
Dept: PRIMARY CARE CLINIC | Facility: OTHER | Age: 71
End: 2021-08-19
Payer: MEDICARE

## 2021-08-19 DIAGNOSIS — J02.9 SORE THROAT: Primary | ICD-10-CM

## 2021-08-19 PROCEDURE — U0003 INFECTIOUS AGENT DETECTION BY NUCLEIC ACID (DNA OR RNA); SEVERE ACUTE RESPIRATORY SYNDROME CORONAVIRUS 2 (SARS-COV-2) (CORONAVIRUS DISEASE [COVID-19]), AMPLIFIED PROBE TECHNIQUE, MAKING USE OF HIGH THROUGHPUT TECHNOLOGIES AS DESCRIBED BY CMS-2020-01-R: HCPCS | Performed by: FAMILY MEDICINE

## 2021-08-23 ENCOUNTER — PATIENT MESSAGE (OUTPATIENT)
Dept: HEMATOLOGY/ONCOLOGY | Facility: CLINIC | Age: 71
End: 2021-08-23

## 2021-08-23 LAB
SARS-COV-2 RNA RESP QL NAA+PROBE: NOT DETECTED
SARS-COV-2- CYCLE NUMBER: -1

## 2021-08-25 ENCOUNTER — OFFICE VISIT (OUTPATIENT)
Dept: DERMATOLOGY | Facility: CLINIC | Age: 71
End: 2021-08-25
Payer: MEDICARE

## 2021-08-25 ENCOUNTER — TELEPHONE (OUTPATIENT)
Dept: HEMATOLOGY/ONCOLOGY | Facility: CLINIC | Age: 71
End: 2021-08-25

## 2021-08-25 VITALS — WEIGHT: 216.5 LBS | HEIGHT: 60 IN | BODY MASS INDEX: 42.5 KG/M2 | RESPIRATION RATE: 18 BRPM

## 2021-08-25 DIAGNOSIS — D48.5 NEOPLASM OF UNCERTAIN BEHAVIOR OF SKIN: Primary | ICD-10-CM

## 2021-08-25 DIAGNOSIS — B07.9 WART ON THUMB: ICD-10-CM

## 2021-08-25 PROCEDURE — 99999 PR PBB SHADOW E&M-EST. PATIENT-LVL IV: CPT | Mod: PBBFAC,,, | Performed by: DERMATOLOGY

## 2021-08-25 PROCEDURE — 88305 TISSUE EXAM BY PATHOLOGIST: ICD-10-PCS | Mod: 26,,, | Performed by: PATHOLOGY

## 2021-08-25 PROCEDURE — 11102 PR TANGENTIAL BIOPSY, SKIN, SINGLE LESION: ICD-10-PCS | Mod: S$PBB,,, | Performed by: DERMATOLOGY

## 2021-08-25 PROCEDURE — 99499 NO LOS: ICD-10-PCS | Mod: S$PBB,,, | Performed by: DERMATOLOGY

## 2021-08-25 PROCEDURE — 88342 CHG IMMUNOCYTOCHEMISTRY: ICD-10-PCS | Mod: 26,,, | Performed by: PATHOLOGY

## 2021-08-25 PROCEDURE — 11102 TANGNTL BX SKIN SINGLE LES: CPT | Mod: PBBFAC,PO | Performed by: DERMATOLOGY

## 2021-08-25 PROCEDURE — 88342 IMHCHEM/IMCYTCHM 1ST ANTB: CPT | Performed by: PATHOLOGY

## 2021-08-25 PROCEDURE — 99999 PR PBB SHADOW E&M-EST. PATIENT-LVL IV: ICD-10-PCS | Mod: PBBFAC,,, | Performed by: DERMATOLOGY

## 2021-08-25 PROCEDURE — 88305 TISSUE EXAM BY PATHOLOGIST: CPT | Performed by: PATHOLOGY

## 2021-08-25 PROCEDURE — 11102 TANGNTL BX SKIN SINGLE LES: CPT | Mod: S$PBB,,, | Performed by: DERMATOLOGY

## 2021-08-25 PROCEDURE — 88305 TISSUE EXAM BY PATHOLOGIST: CPT | Mod: 26,,, | Performed by: PATHOLOGY

## 2021-08-25 PROCEDURE — 88341 IMHCHEM/IMCYTCHM EA ADD ANTB: CPT | Mod: 26,,, | Performed by: PATHOLOGY

## 2021-08-25 PROCEDURE — 88341 IMHCHEM/IMCYTCHM EA ADD ANTB: CPT | Performed by: PATHOLOGY

## 2021-08-25 PROCEDURE — 88342 IMHCHEM/IMCYTCHM 1ST ANTB: CPT | Mod: 26,,, | Performed by: PATHOLOGY

## 2021-08-25 PROCEDURE — 99499 UNLISTED E&M SERVICE: CPT | Mod: S$PBB,,, | Performed by: DERMATOLOGY

## 2021-08-25 PROCEDURE — 88341 PR IHC OR ICC EACH ADD'L SINGLE ANTIBODY  STAINPR: ICD-10-PCS | Mod: 26,,, | Performed by: PATHOLOGY

## 2021-08-25 PROCEDURE — 99214 OFFICE O/P EST MOD 30 MIN: CPT | Mod: PBBFAC,PO,25 | Performed by: DERMATOLOGY

## 2021-09-08 LAB
FINAL PATHOLOGIC DIAGNOSIS: NORMAL
GROSS: NORMAL
Lab: NORMAL
MICROSCOPIC EXAM: NORMAL

## 2021-10-07 ENCOUNTER — TELEPHONE (OUTPATIENT)
Dept: DERMATOLOGY | Facility: CLINIC | Age: 71
End: 2021-10-07

## 2021-10-14 ENCOUNTER — OFFICE VISIT (OUTPATIENT)
Dept: DERMATOLOGY | Facility: CLINIC | Age: 71
End: 2021-10-14
Payer: MEDICARE

## 2021-10-14 VITALS — HEIGHT: 60 IN | BODY MASS INDEX: 42.5 KG/M2 | WEIGHT: 216.5 LBS | RESPIRATION RATE: 18 BRPM

## 2021-10-14 DIAGNOSIS — B07.8 COMMON WART: Primary | ICD-10-CM

## 2021-10-14 PROBLEM — R91.1 LUNG NODULE: Status: ACTIVE | Noted: 2021-10-14

## 2021-10-14 PROBLEM — K44.9 HIATAL HERNIA: Status: ACTIVE | Noted: 2021-10-14

## 2021-10-14 PROBLEM — Z90.3 S/P GASTRIC SLEEVE PROCEDURE: Status: ACTIVE | Noted: 2021-10-14

## 2021-10-14 PROCEDURE — 17110 DESTRUCTION B9 LES UP TO 14: CPT | Mod: S$PBB,,, | Performed by: DERMATOLOGY

## 2021-10-14 PROCEDURE — 99213 OFFICE O/P EST LOW 20 MIN: CPT | Mod: PBBFAC,PO | Performed by: DERMATOLOGY

## 2021-10-14 PROCEDURE — 99999 PR PBB SHADOW E&M-EST. PATIENT-LVL III: CPT | Mod: PBBFAC,,, | Performed by: DERMATOLOGY

## 2021-10-14 PROCEDURE — 99499 NO LOS: ICD-10-PCS | Mod: S$PBB,,, | Performed by: DERMATOLOGY

## 2021-10-14 PROCEDURE — 17110 PR DESTRUCTION BENIGN LESIONS UP TO 14: ICD-10-PCS | Mod: S$PBB,,, | Performed by: DERMATOLOGY

## 2021-10-14 PROCEDURE — 99999 PR PBB SHADOW E&M-EST. PATIENT-LVL III: ICD-10-PCS | Mod: PBBFAC,,, | Performed by: DERMATOLOGY

## 2021-10-14 PROCEDURE — 17110 DESTRUCTION B9 LES UP TO 14: CPT | Mod: PBBFAC,PO | Performed by: DERMATOLOGY

## 2021-10-14 PROCEDURE — 99499 UNLISTED E&M SERVICE: CPT | Mod: S$PBB,,, | Performed by: DERMATOLOGY

## 2021-10-14 RX ORDER — FLUOROURACIL 50 MG/G
CREAM TOPICAL 2 TIMES DAILY
Qty: 40 G | Refills: 1 | Status: SHIPPED | OUTPATIENT
Start: 2021-10-14 | End: 2022-04-25

## 2021-10-26 ENCOUNTER — HOSPITAL ENCOUNTER (OUTPATIENT)
Dept: RADIOLOGY | Facility: HOSPITAL | Age: 71
Discharge: HOME | End: 2021-10-26
Attending: FAMILY MEDICINE
Payer: MEDICARE

## 2021-10-26 DIAGNOSIS — Z12.31 ENCOUNTER FOR SCREENING MAMMOGRAM FOR MALIGNANT NEOPLASM OF BREAST: ICD-10-CM

## 2021-10-26 PROCEDURE — 77067 MAMMO DIGITAL SCREENING BILAT WITH TOMO: ICD-10-PCS | Mod: 26,,, | Performed by: RADIOLOGY

## 2021-10-26 PROCEDURE — 77067 SCR MAMMO BI INCL CAD: CPT | Mod: TC,PO

## 2021-10-26 PROCEDURE — 77063 MAMMO DIGITAL SCREENING BILAT WITH TOMO: ICD-10-PCS | Mod: 26,,, | Performed by: RADIOLOGY

## 2021-10-26 PROCEDURE — 77067 SCR MAMMO BI INCL CAD: CPT | Mod: 26,,, | Performed by: RADIOLOGY

## 2021-10-26 PROCEDURE — 77063 BREAST TOMOSYNTHESIS BI: CPT | Mod: 26,,, | Performed by: RADIOLOGY

## 2021-10-28 ENCOUNTER — OFFICE VISIT (OUTPATIENT)
Dept: GASTROENTEROLOGY | Facility: CLINIC | Age: 71
End: 2021-10-28
Payer: MEDICARE

## 2021-10-28 VITALS — BODY MASS INDEX: 38.95 KG/M2 | WEIGHT: 219.81 LBS | HEIGHT: 63 IN

## 2021-10-28 DIAGNOSIS — D21.4 GIST (GASTROINTESTINAL STROMAL TUMOR), NON-MALIGNANT: ICD-10-CM

## 2021-10-28 PROCEDURE — 99213 OFFICE O/P EST LOW 20 MIN: CPT | Mod: PBBFAC,PO | Performed by: INTERNAL MEDICINE

## 2021-10-28 PROCEDURE — 99204 OFFICE O/P NEW MOD 45 MIN: CPT | Mod: S$PBB,,, | Performed by: INTERNAL MEDICINE

## 2021-10-28 PROCEDURE — 99999 PR PBB SHADOW E&M-EST. PATIENT-LVL III: CPT | Mod: PBBFAC,,, | Performed by: INTERNAL MEDICINE

## 2021-10-28 PROCEDURE — 99999 PR PBB SHADOW E&M-EST. PATIENT-LVL III: ICD-10-PCS | Mod: PBBFAC,,, | Performed by: INTERNAL MEDICINE

## 2021-10-28 PROCEDURE — 99204 PR OFFICE/OUTPT VISIT, NEW, LEVL IV, 45-59 MIN: ICD-10-PCS | Mod: S$PBB,,, | Performed by: INTERNAL MEDICINE

## 2021-11-01 DIAGNOSIS — I25.2 HISTORY OF NON-ST ELEVATION MYOCARDIAL INFARCTION (NSTEMI): ICD-10-CM

## 2021-11-01 DIAGNOSIS — E78.5 DYSLIPIDEMIA: ICD-10-CM

## 2021-11-01 DIAGNOSIS — Z95.5 STENTED CORONARY ARTERY: Chronic | ICD-10-CM

## 2021-11-01 DIAGNOSIS — I10 ESSENTIAL HYPERTENSION: ICD-10-CM

## 2021-11-01 DIAGNOSIS — I25.10 ATHEROSCLEROSIS OF NATIVE CORONARY ARTERY OF NATIVE HEART WITHOUT ANGINA PECTORIS: ICD-10-CM

## 2021-11-02 RX ORDER — CLOPIDOGREL BISULFATE 75 MG/1
TABLET ORAL
Qty: 90 TABLET | Refills: 4 | Status: SHIPPED | OUTPATIENT
Start: 2021-11-02 | End: 2023-01-30

## 2021-11-12 ENCOUNTER — OFFICE VISIT (OUTPATIENT)
Dept: BARIATRICS | Facility: CLINIC | Age: 71
End: 2021-11-12
Payer: MEDICARE

## 2021-11-12 ENCOUNTER — IMMUNIZATION (OUTPATIENT)
Dept: FAMILY MEDICINE | Facility: CLINIC | Age: 71
End: 2021-11-12
Payer: MEDICARE

## 2021-11-12 VITALS
HEIGHT: 64 IN | HEART RATE: 67 BPM | TEMPERATURE: 98 F | DIASTOLIC BLOOD PRESSURE: 82 MMHG | WEIGHT: 213.38 LBS | SYSTOLIC BLOOD PRESSURE: 150 MMHG | RESPIRATION RATE: 16 BRPM | BODY MASS INDEX: 36.43 KG/M2

## 2021-11-12 DIAGNOSIS — D21.4 GIST (GASTROINTESTINAL STROMAL TUMOR), NON-MALIGNANT: Primary | ICD-10-CM

## 2021-11-12 DIAGNOSIS — K44.9 HIATAL HERNIA: ICD-10-CM

## 2021-11-12 DIAGNOSIS — E66.01 MORBID OBESITY: ICD-10-CM

## 2021-11-12 DIAGNOSIS — I10 ESSENTIAL HYPERTENSION: ICD-10-CM

## 2021-11-12 PROCEDURE — 99214 OFFICE O/P EST MOD 30 MIN: CPT | Mod: PBBFAC,PO,25 | Performed by: SURGERY

## 2021-11-12 PROCEDURE — 99213 PR OFFICE/OUTPT VISIT, EST, LEVL III, 20-29 MIN: ICD-10-PCS | Mod: S$PBB,,, | Performed by: SURGERY

## 2021-11-12 PROCEDURE — G0008 ADMIN INFLUENZA VIRUS VAC: HCPCS | Mod: PBBFAC,PO

## 2021-11-12 PROCEDURE — 90694 VACC AIIV4 NO PRSRV 0.5ML IM: CPT | Mod: PBBFAC,PO

## 2021-11-12 PROCEDURE — 99999 PR PBB SHADOW E&M-EST. PATIENT-LVL IV: ICD-10-PCS | Mod: PBBFAC,,, | Performed by: SURGERY

## 2021-11-12 PROCEDURE — 99213 OFFICE O/P EST LOW 20 MIN: CPT | Mod: S$PBB,,, | Performed by: SURGERY

## 2021-11-12 PROCEDURE — 99999 PR PBB SHADOW E&M-EST. PATIENT-LVL IV: CPT | Mod: PBBFAC,,, | Performed by: SURGERY

## 2021-11-12 RX ORDER — TOPIRAMATE 25 MG/1
50 TABLET ORAL 2 TIMES DAILY
Qty: 120 TABLET | Refills: 2 | Status: SHIPPED | OUTPATIENT
Start: 2021-11-12 | End: 2023-08-11

## 2021-11-16 ENCOUNTER — TELEPHONE (OUTPATIENT)
Dept: ENDOSCOPY | Facility: HOSPITAL | Age: 71
End: 2021-11-16
Payer: MEDICARE

## 2021-11-17 ENCOUNTER — TELEPHONE (OUTPATIENT)
Dept: ENDOSCOPY | Facility: HOSPITAL | Age: 71
End: 2021-11-17
Payer: MEDICARE

## 2021-11-18 ENCOUNTER — ANESTHESIA EVENT (OUTPATIENT)
Dept: ENDOSCOPY | Facility: HOSPITAL | Age: 71
End: 2021-11-18
Payer: MEDICARE

## 2021-11-18 ENCOUNTER — ANESTHESIA (OUTPATIENT)
Dept: ENDOSCOPY | Facility: HOSPITAL | Age: 71
End: 2021-11-18
Payer: MEDICARE

## 2021-11-18 ENCOUNTER — HOSPITAL ENCOUNTER (OUTPATIENT)
Facility: HOSPITAL | Age: 71
Discharge: HOME | End: 2021-11-18
Attending: INTERNAL MEDICINE | Admitting: INTERNAL MEDICINE
Payer: MEDICARE

## 2021-11-18 VITALS
OXYGEN SATURATION: 97 % | BODY MASS INDEX: 37.92 KG/M2 | WEIGHT: 214 LBS | HEART RATE: 61 BPM | DIASTOLIC BLOOD PRESSURE: 58 MMHG | SYSTOLIC BLOOD PRESSURE: 99 MMHG | RESPIRATION RATE: 16 BRPM | TEMPERATURE: 98 F | HEIGHT: 63 IN

## 2021-11-18 DIAGNOSIS — Z85.09 HISTORY OF GASTROINTESTINAL STROMAL TUMOR (GIST): ICD-10-CM

## 2021-11-18 DIAGNOSIS — C49.A2 GASTROINTESTINAL STROMAL TUMOR (GIST) OF STOMACH: Primary | ICD-10-CM

## 2021-11-18 PROCEDURE — 88305 TISSUE EXAM BY PATHOLOGIST: CPT | Mod: 26,,, | Performed by: STUDENT IN AN ORGANIZED HEALTH CARE EDUCATION/TRAINING PROGRAM

## 2021-11-18 PROCEDURE — 63600175 PHARM REV CODE 636 W HCPCS: Mod: PO | Performed by: INTERNAL MEDICINE

## 2021-11-18 PROCEDURE — 37000009 HC ANESTHESIA EA ADD 15 MINS: Mod: PO | Performed by: INTERNAL MEDICINE

## 2021-11-18 PROCEDURE — 37000008 HC ANESTHESIA 1ST 15 MINUTES: Mod: PO | Performed by: INTERNAL MEDICINE

## 2021-11-18 PROCEDURE — 63600175 PHARM REV CODE 636 W HCPCS: Mod: PO | Performed by: NURSE ANESTHETIST, CERTIFIED REGISTERED

## 2021-11-18 PROCEDURE — 25000003 PHARM REV CODE 250: Mod: PO | Performed by: NURSE ANESTHETIST, CERTIFIED REGISTERED

## 2021-11-18 PROCEDURE — 88305 TISSUE EXAM BY PATHOLOGIST: CPT | Performed by: STUDENT IN AN ORGANIZED HEALTH CARE EDUCATION/TRAINING PROGRAM

## 2021-11-18 PROCEDURE — 88305 TISSUE EXAM BY PATHOLOGIST: ICD-10-PCS | Mod: 26,,, | Performed by: STUDENT IN AN ORGANIZED HEALTH CARE EDUCATION/TRAINING PROGRAM

## 2021-11-18 PROCEDURE — D9220A PRA ANESTHESIA: Mod: ANES,,, | Performed by: ANESTHESIOLOGY

## 2021-11-18 PROCEDURE — 43239 EGD BIOPSY SINGLE/MULTIPLE: CPT | Mod: PO | Performed by: INTERNAL MEDICINE

## 2021-11-18 PROCEDURE — D9220A PRA ANESTHESIA: ICD-10-PCS | Mod: ANES,,, | Performed by: ANESTHESIOLOGY

## 2021-11-18 PROCEDURE — D9220A PRA ANESTHESIA: Mod: CRNA,,, | Performed by: NURSE ANESTHETIST, CERTIFIED REGISTERED

## 2021-11-18 PROCEDURE — D9220A PRA ANESTHESIA: ICD-10-PCS | Mod: CRNA,,, | Performed by: NURSE ANESTHETIST, CERTIFIED REGISTERED

## 2021-11-18 PROCEDURE — 43239 PR EGD, FLEX, W/BIOPSY, SGL/MULTI: ICD-10-PCS | Mod: ,,, | Performed by: INTERNAL MEDICINE

## 2021-11-18 PROCEDURE — 27201012 HC FORCEPS, HOT/COLD, DISP: Mod: PO | Performed by: INTERNAL MEDICINE

## 2021-11-18 PROCEDURE — 43239 EGD BIOPSY SINGLE/MULTIPLE: CPT | Mod: ,,, | Performed by: INTERNAL MEDICINE

## 2021-11-18 RX ORDER — PROPOFOL 10 MG/ML
VIAL (ML) INTRAVENOUS
Status: DISCONTINUED | OUTPATIENT
Start: 2021-11-18 | End: 2021-11-18

## 2021-11-18 RX ORDER — PROPOFOL 10 MG/ML
VIAL (ML) INTRAVENOUS CONTINUOUS PRN
Status: DISCONTINUED | OUTPATIENT
Start: 2021-11-18 | End: 2021-11-18

## 2021-11-18 RX ORDER — FENTANYL CITRATE 50 UG/ML
INJECTION, SOLUTION INTRAMUSCULAR; INTRAVENOUS
Status: DISCONTINUED | OUTPATIENT
Start: 2021-11-18 | End: 2021-11-18

## 2021-11-18 RX ORDER — SODIUM CHLORIDE, SODIUM LACTATE, POTASSIUM CHLORIDE, CALCIUM CHLORIDE 600; 310; 30; 20 MG/100ML; MG/100ML; MG/100ML; MG/100ML
INJECTION, SOLUTION INTRAVENOUS CONTINUOUS
Status: DISCONTINUED | OUTPATIENT
Start: 2021-11-18 | End: 2021-11-18 | Stop reason: HOSPADM

## 2021-11-18 RX ORDER — LIDOCAINE HCL/PF 100 MG/5ML
SYRINGE (ML) INTRAVENOUS
Status: DISCONTINUED | OUTPATIENT
Start: 2021-11-18 | End: 2021-11-18

## 2021-11-18 RX ORDER — SODIUM CHLORIDE 0.9 % (FLUSH) 0.9 %
10 SYRINGE (ML) INJECTION EVERY 6 HOURS PRN
Status: DISCONTINUED | OUTPATIENT
Start: 2021-11-18 | End: 2021-11-18 | Stop reason: HOSPADM

## 2021-11-18 RX ADMIN — LIDOCAINE HYDROCHLORIDE 100 MG: 20 INJECTION, SOLUTION INTRAVENOUS at 10:11

## 2021-11-18 RX ADMIN — SODIUM CHLORIDE, SODIUM LACTATE, POTASSIUM CHLORIDE, AND CALCIUM CHLORIDE: .6; .31; .03; .02 INJECTION, SOLUTION INTRAVENOUS at 09:11

## 2021-11-18 RX ADMIN — PROPOFOL 50 MG: 10 INJECTION, EMULSION INTRAVENOUS at 10:11

## 2021-11-18 RX ADMIN — FENTANYL CITRATE 50 MCG: 50 INJECTION, SOLUTION INTRAMUSCULAR; INTRAVENOUS at 10:11

## 2021-11-18 RX ADMIN — PROPOFOL 100 MCG/KG/MIN: 10 INJECTION, EMULSION INTRAVENOUS at 10:11

## 2021-12-02 LAB
FINAL PATHOLOGIC DIAGNOSIS: NORMAL
Lab: NORMAL

## 2021-12-28 ENCOUNTER — PATIENT MESSAGE (OUTPATIENT)
Dept: GASTROENTEROLOGY | Facility: CLINIC | Age: 71
End: 2021-12-28
Payer: MEDICARE

## 2021-12-28 DIAGNOSIS — Z01.818 PRE-OP TESTING: ICD-10-CM

## 2022-01-03 ENCOUNTER — LAB VISIT (OUTPATIENT)
Dept: FAMILY MEDICINE | Facility: CLINIC | Age: 72
End: 2022-01-03
Payer: MEDICARE

## 2022-01-03 DIAGNOSIS — Z01.818 PRE-OP TESTING: ICD-10-CM

## 2022-01-03 PROCEDURE — U0003 INFECTIOUS AGENT DETECTION BY NUCLEIC ACID (DNA OR RNA); SEVERE ACUTE RESPIRATORY SYNDROME CORONAVIRUS 2 (SARS-COV-2) (CORONAVIRUS DISEASE [COVID-19]), AMPLIFIED PROBE TECHNIQUE, MAKING USE OF HIGH THROUGHPUT TECHNOLOGIES AS DESCRIBED BY CMS-2020-01-R: HCPCS | Performed by: INTERNAL MEDICINE

## 2022-01-03 PROCEDURE — U0005 INFEC AGEN DETEC AMPLI PROBE: HCPCS | Performed by: INTERNAL MEDICINE

## 2022-01-04 ENCOUNTER — TELEPHONE (OUTPATIENT)
Dept: ENDOSCOPY | Facility: HOSPITAL | Age: 72
End: 2022-01-04
Payer: MEDICARE

## 2022-01-04 DIAGNOSIS — Z01.818 PRE-OP TESTING: ICD-10-CM

## 2022-01-04 NOTE — TELEPHONE ENCOUNTER
Called patient and notified that procedure will need to be rescheduled due to not holding plavix. Please reach out to patient to reschedule. Thank you!

## 2022-01-04 NOTE — TELEPHONE ENCOUNTER
Per Dr. Ron, patient will need to be rescheduled.    Spoke with patient and rescheduled her to 1/25 in [place] per her request.

## 2022-01-04 NOTE — TELEPHONE ENCOUNTER
Patient is scheduled for a colonoscopy on Thursday 1/6/22. She has not stopped her plavix (last dose was today 1/4/22). Please reach out to patient if she will need to be rescheduled. Thanks!

## 2022-01-05 LAB
SARS-COV-2 RNA RESP QL NAA+PROBE: NOT DETECTED
SARS-COV-2- CYCLE NUMBER: NORMAL

## 2022-01-22 ENCOUNTER — LAB VISIT (OUTPATIENT)
Dept: FAMILY MEDICINE | Facility: CLINIC | Age: 72
End: 2022-01-22
Payer: MEDICARE

## 2022-01-22 DIAGNOSIS — Z01.818 PRE-OP TESTING: ICD-10-CM

## 2022-01-22 PROCEDURE — U0003 INFECTIOUS AGENT DETECTION BY NUCLEIC ACID (DNA OR RNA); SEVERE ACUTE RESPIRATORY SYNDROME CORONAVIRUS 2 (SARS-COV-2) (CORONAVIRUS DISEASE [COVID-19]), AMPLIFIED PROBE TECHNIQUE, MAKING USE OF HIGH THROUGHPUT TECHNOLOGIES AS DESCRIBED BY CMS-2020-01-R: HCPCS | Performed by: INTERNAL MEDICINE

## 2022-01-22 PROCEDURE — U0005 INFEC AGEN DETEC AMPLI PROBE: HCPCS | Performed by: INTERNAL MEDICINE

## 2022-01-24 LAB
SARS-COV-2 RNA RESP QL NAA+PROBE: NOT DETECTED
SARS-COV-2- CYCLE NUMBER: NORMAL

## 2022-01-25 ENCOUNTER — ANESTHESIA EVENT (OUTPATIENT)
Dept: ENDOSCOPY | Facility: HOSPITAL | Age: 72
End: 2022-01-25
Payer: MEDICARE

## 2022-01-25 ENCOUNTER — HOSPITAL ENCOUNTER (OUTPATIENT)
Facility: HOSPITAL | Age: 72
Discharge: HOME | End: 2022-01-25
Attending: INTERNAL MEDICINE | Admitting: INTERNAL MEDICINE
Payer: MEDICARE

## 2022-01-25 ENCOUNTER — ANESTHESIA (OUTPATIENT)
Dept: ENDOSCOPY | Facility: HOSPITAL | Age: 72
End: 2022-01-25
Payer: MEDICARE

## 2022-01-25 DIAGNOSIS — Z12.11 SCREENING FOR COLON CANCER: ICD-10-CM

## 2022-01-25 PROCEDURE — G0121 COLON CA SCRN NOT HI RSK IND: ICD-10-PCS | Mod: ,,, | Performed by: INTERNAL MEDICINE

## 2022-01-25 PROCEDURE — G0121 COLON CA SCRN NOT HI RSK IND: HCPCS | Mod: ,,, | Performed by: INTERNAL MEDICINE

## 2022-01-25 PROCEDURE — 25000003 PHARM REV CODE 250: Performed by: NURSE ANESTHETIST, CERTIFIED REGISTERED

## 2022-01-25 PROCEDURE — 25000003 PHARM REV CODE 250: Performed by: INTERNAL MEDICINE

## 2022-01-25 PROCEDURE — D9220A PRA ANESTHESIA: ICD-10-PCS | Mod: ANES,,, | Performed by: ANESTHESIOLOGY

## 2022-01-25 PROCEDURE — G0121 COLON CA SCRN NOT HI RSK IND: HCPCS | Performed by: INTERNAL MEDICINE

## 2022-01-25 PROCEDURE — 37000009 HC ANESTHESIA EA ADD 15 MINS: Performed by: INTERNAL MEDICINE

## 2022-01-25 PROCEDURE — D9220A PRA ANESTHESIA: ICD-10-PCS | Mod: CRNA,,, | Performed by: NURSE ANESTHETIST, CERTIFIED REGISTERED

## 2022-01-25 PROCEDURE — 37000008 HC ANESTHESIA 1ST 15 MINUTES: Performed by: INTERNAL MEDICINE

## 2022-01-25 PROCEDURE — D9220A PRA ANESTHESIA: Mod: ANES,,, | Performed by: ANESTHESIOLOGY

## 2022-01-25 PROCEDURE — D9220A PRA ANESTHESIA: Mod: CRNA,,, | Performed by: NURSE ANESTHETIST, CERTIFIED REGISTERED

## 2022-01-25 PROCEDURE — 63600175 PHARM REV CODE 636 W HCPCS: Performed by: NURSE ANESTHETIST, CERTIFIED REGISTERED

## 2022-01-25 RX ORDER — PROPOFOL 10 MG/ML
VIAL (ML) INTRAVENOUS
Status: DISCONTINUED | OUTPATIENT
Start: 2022-01-25 | End: 2022-01-25

## 2022-01-25 RX ORDER — SODIUM CHLORIDE 9 MG/ML
INJECTION, SOLUTION INTRAVENOUS CONTINUOUS
Status: DISCONTINUED | OUTPATIENT
Start: 2022-01-25 | End: 2022-01-25 | Stop reason: HOSPADM

## 2022-01-25 RX ORDER — LIDOCAINE HYDROCHLORIDE 20 MG/ML
INJECTION INTRAVENOUS
Status: DISCONTINUED | OUTPATIENT
Start: 2022-01-25 | End: 2022-01-25

## 2022-01-25 RX ADMIN — PROPOFOL 50 MG: 10 INJECTION, EMULSION INTRAVENOUS at 12:01

## 2022-01-25 RX ADMIN — LIDOCAINE HYDROCHLORIDE 50 MG: 20 INJECTION, SOLUTION INTRAVENOUS at 12:01

## 2022-01-25 RX ADMIN — PROPOFOL 100 MG: 10 INJECTION, EMULSION INTRAVENOUS at 12:01

## 2022-01-25 RX ADMIN — SODIUM CHLORIDE: 0.9 INJECTION, SOLUTION INTRAVENOUS at 10:01

## 2022-01-25 NOTE — PLAN OF CARE
Vss, lisa po fluids, denies pain, ambulates easily. IV removed, catheter intact. Discharge instructions provided and states understanding. States ready to go home.  Discharged from facility with family per wheelchair.

## 2022-01-25 NOTE — PROVATION PATIENT INSTRUCTIONS
Discharge Summary/Instructions after an Endoscopic Procedure  Patient Name: Nancy Figueroa  Patient MRN: [id number]  Patient YOB: 1950 Tuesday, January 25, 2022  Tanner Ron MD  Dear patient,  As a result of recent federal legislation (The Federal Cures Act), you may   receive lab or pathology results from your procedure in your [facility]   account before your physician is able to contact you. Your physician or   their representative will relay the results to you with their   recommendations at their soonest availability.  Thank you,  RESTRICTIONS:  During your procedure today, you received medications for sedation.  These   medications may affect your judgment, balance and coordination.  Therefore,   for 24 hours, you have the following restrictions:   - DO NOT drive a car, operate machinery, make legal/financial decisions,   sign important papers or drink alcohol.    ACTIVITY:  Today: no heavy lifting, straining or running due to procedural   sedation/anesthesia.  The following day: return to full activity including work.  DIET:  Eat and drink normally unless instructed otherwise.     TREATMENT FOR COMMON SIDE EFFECTS:  - Mild abdominal pain, nausea, belching, bloating or excessive gas:  rest,   eat lightly and use a heating pad.  - Sore Throat: treat with throat lozenges and/or gargle with warm salt   water.  - Because air was used during the procedure, expelling large amounts of air   from your rectum or belching is normal.  - If a bowel prep was taken, you may not have a bowel movement for 1-3 days.    This is normal.  SYMPTOMS TO WATCH FOR AND REPORT TO YOUR PHYSICIAN:  1. Abdominal pain or bloating, other than gas cramps.  2. Chest pain.  3. Back pain.  4. Signs of infection such as: chills or fever occurring within 24 hours   after the procedure.  5. Rectal bleeding, which would show as bright red, maroon, or black stools.   (A tablespoon of blood from the rectum is not serious, especially  if   hemorrhoids are present.)  6. Vomiting.  7. Weakness or dizziness.  GO DIRECTLY TO THE NEAREST EMERGENCY ROOM IF YOU HAVE ANY OF THE FOLLOWING:      Difficulty breathing              Chills and/or fever over 101 F   Persistent vomiting and/or vomiting blood   Severe abdominal pain   Severe chest pain   Black, tarry stools   Bleeding- more than one tablespoon   Any other symptom or condition that you feel may need urgent attention  Your doctor recommends these additional instructions:  If any biopsies were taken, your doctors clinic will contact you in 1 to 2   weeks with any results.  - Repeat colonoscopy in 10 years for screening purposes.   - Discharge patient to home.   - Advance diet as tolerated.   - Continue present medications.   - Written discharge instructions were provided to the patient.  For questions, problems or results please call your physician - Tanner Ron MD at Work:  [phone].  [facility], EMERGENCY ROOM PHONE NUMBER: [phone]  IF A COMPLICATION OR EMERGENCY SITUATION ARISES AND YOU ARE UNABLE TO REACH   YOUR PHYSICIAN - GO DIRECTLY TO THE EMERGENCY ROOM.  Tanner Ron MD  1/25/2022 12:35:08 PM  This report has been verified and signed electronically.  Dear patient,  As a result of recent federal legislation (The Federal Cures Act), you may   receive lab or pathology results from your procedure in your [facility]   account before your physician is able to contact you. Your physician or   their representative will relay the results to you with their   recommendations at their soonest availability.  Thank you,  PROVATION

## 2022-01-25 NOTE — ANESTHESIA POSTPROCEDURE EVALUATION
Anesthesia Post Evaluation    Patient: Nancy Figueroa    Procedure(s) Performed: Procedure(s) (LRB):  COLONOSCOPY (N/A)    Final Anesthesia Type: general      Patient location during evaluation: PACU  Patient participation: Yes- Able to Participate  Level of consciousness: sedated and awake  Post-procedure vital signs: reviewed and stable  Pain management: adequate  Airway patency: patent    PONV status at discharge: No PONV  Anesthetic complications: no      Cardiovascular status: blood pressure returned to baseline  Respiratory status: spontaneous ventilation  Hydration status: euvolemic  Follow-up not needed.          Vitals Value Taken Time   /56 01/25/22 1250   Temp 36.6 °C (97.9 °F) 01/25/22 1245   Pulse 60 01/25/22 1250   Resp 16 01/25/22 1250   SpO2 98 % 01/25/22 1250         Event Time   Out of Recovery 12:57:58         Pain/Pardeep Score: Pardeep Score: 10 (1/25/2022 12:55 PM)

## 2022-01-25 NOTE — H&P
History & Physical - Short Stay  Gastroenterology      SUBJECTIVE:     Procedure: Colonoscopy    Chief Complaint/Indication for Procedure: Screening    PTA Medications   Medication Sig    amLODIPine (NORVASC) 5 MG tablet Take 1 tablet (5 mg total) by mouth once daily.    aspirin (ECOTRIN) 81 MG EC tablet Take 81 mg by mouth every evening. HOLD FOR 1 WEEK PRIOR TO SURGERY    atorvastatin (LIPITOR) 10 MG tablet Take 1 tablet (10 mg total) by mouth once daily.    calcium carbonate 650 mg calcium (1,625 mg) tablet Take 2 tablets by mouth once daily.    carvediloL (COREG) 6.25 MG tablet Take 1 tablet (6.25 mg total) by mouth 2 (two) times daily.    clopidogreL (PLAVIX) 75 mg tablet TAKE 1 TABLET(75 MG) BY MOUTH EVERY DAY    cyanocobalamin 500 MCG tablet Take 500 mcg by mouth once daily.    levothyroxine (SYNTHROID) 112 MCG tablet TAKE 1 TABLET(112 MCG) BY MOUTH BEFORE BREAKFAST    multivitamin (THERAGRAN) per tablet Take 1 tablet by mouth once daily.    topiramate (TOPAMAX) 25 MG tablet Take 2 tablets (50 mg total) by mouth 2 (two) times daily.    fluorouraciL (EFUDEX) 5 % cream Apply topically 2 (two) times daily.    ofloxacin (OCUFLOX) 0.3 % ophthalmic solution     prednisoLONE acetate (PRED FORTE) 1 % DrpS        Review of patient's allergies indicates:  No Known Allergies     Past Medical History:   Diagnosis Date    Atherosclerotic heart disease of native coronary artery without angina pectoris 5/1/2017 4/17  Left main: normal  LAD: two diagonals  Less than 50%.  90% mid LAD Circumflex:  minimal disease, less than 20%. --  RCA: The vessel was large sized (dominant) with less than 20% stenosis.    Cancer     GIST stomach    Cataract     OU    Essential hypertension 10/18/2012    GERD (gastroesophageal reflux disease)     HTN (hypertension)     Hypothyroidism     NSTEMI (non-ST elevated myocardial infarction) 4/24/2017 4/2017    Stented coronary artery 7/26/2017 4/17 LAD-Synergy 3.0 x 28       Past Surgical History:   Procedure Laterality Date    APPENDECTOMY      CARDIAC CATHETERIZATION  2017    LAD stent     CATARACT EXTRACTION Bilateral     per pt     COLONOSCOPY      ESOPHAGOGASTRODUODENOSCOPY N/A 11/18/2021    Procedure: EGD (ESOPHAGOGASTRODUODENOSCOPY);  Surgeon: Tanner Ron MD;  [facility];  Service: Endoscopy;  Laterality: N/A;    HYSTERECTOMY      YANG with BSO    LAPAROSCOPIC SLEEVE GASTRECTOMY N/A 1/27/2020    Procedure: GASTRECTOMY, SLEEVE, LAPAROSCOPIC;  Surgeon: Cirilo Mathias MD;  Location: [facility] OR;  Service: General;  Laterality: N/A;     Family History   Problem Relation Age of Onset    Cancer Mother 80        breast cancer    Dementia Mother     Cataracts Mother     Breast cancer Mother 70    Cancer Father         lung cancer    Cataracts Father     Cancer Paternal Grandmother         colon    Obesity Sister     Obesity Sister     Obesity Sister      Social History     Tobacco Use    Smoking status: Never Smoker    Smokeless tobacco: Never Used   Substance Use Topics    Alcohol use: No    Drug use: No     OBJECTIVE:     Vital Signs (Most Recent)  Temp: 97.9 °F (36.6 °C) (01/25/22 1046)  Pulse: 60 (01/25/22 1046)  Resp: 12 (01/25/22 1046)  BP: (!) 153/87 (01/25/22 1046)  SpO2: 99 % (01/25/22 1046)    Physical Exam:                                                       GENERAL:  Comfortable, in no acute distress.                                 HEENT EXAM:  Nonicteric.  No adenopathy.  Oropharynx is clear.               NECK:  Supple.                                                               LUNGS:  Clear.                                                               CARDIAC:  Regular rate and rhythm.  S1, S2.  No murmur.                      ABDOMEN:  Soft, positive bowel sounds, nontender.  No hepatosplenomegaly or masses.  No rebound or guarding.                                             EXTREMITIES:  No edema.     MENTAL STATUS:   Normal, alert and oriented.    ASSESSMENT/PLAN:     Assessment: Screening    Plan: Colonoscopy

## 2022-01-25 NOTE — TRANSFER OF CARE
"Anesthesia Transfer of Care Note    Patient: Nancy Figueroa    Procedure(s) Performed: Procedure(s) (LRB):  COLONOSCOPY (N/A)    Patient location: PACU    Anesthesia Type: general    Transport from OR: Transported from OR on 2-3 L/min O2 by NC with adequate spontaneous ventilation    Post pain: adequate analgesia    Post assessment: no apparent anesthetic complications and tolerated procedure well    Post vital signs: stable    Level of consciousness: sedated    Nausea/Vomiting: no nausea/vomiting    Complications: none    Transfer of care protocol was followed      Last vitals:   Visit Vitals  BP (!) 153/87 (BP Location: Left arm, Patient Position: Lying)   Pulse 60   Temp 36.6 °C (97.9 °F) (Skin)   Resp 12   Ht 5' 3" (1.6 m)   Wt 97.1 kg (214 lb)   SpO2 99%   Breastfeeding No   BMI 37.91 kg/m²     "

## 2022-01-25 NOTE — ANESTHESIA PREPROCEDURE EVALUATION
01/25/2022  Nancy Figueroa is a 71 y.o., female.    Anesthesia Evaluation    I have reviewed the NPO Status.   I have reviewed the Medications.     Review of Systems  Anesthesia Hx:  No problems with previous Anesthesia   Social:  Non-Smoker    Cardiovascular:   Hypertension Past MI CAD  CABG/stent  · The left ventricle is normal in size with normal systolic function.  · Mild left atrial enlargement.  · Normal left ventricular diastolic function.  · The estimated PA systolic pressure is 33 mmHg.  · Normal right ventricular size with normal right ventricular systolic function.  · Normal central venous pressure (3 mmHg).  · The estimated ejection fraction is 55%.  · Mild mitral regurgitation.  · Mild tricuspid regurgitation.      Pulmonary:   Shortness of breath    Hepatic/GI:   Bowel Prep. Hiatal Hernia, GERD    Neurological:  Neurology Normal    Endocrine:   Hypothyroidism        Physical Exam  General:  Well nourished                 Anesthesia Plan  Type of Anesthesia, risks & benefits discussed:  Anesthesia Type:  general    Patient's Preference:   Plan Factors:          Intra-op Monitoring Plan: standard ASA monitors  Intra-op Monitoring Plan Comments:   Post Op Pain Control Plan: IV/PO Opioids PRN  Post Op Pain Control Plan Comments:     Induction:   IV  Beta Blocker:  Patient is not currently on a Beta-Blocker (No further documentation required).       Informed Consent: Patient understands risks and agrees with Anesthesia plan.  Questions answered. Anesthesia consent signed with patient.  ASA Score: 3     Day of Surgery Review of History & Physical:    H&P update referred to the provider.         Ready For Surgery From Anesthesia Perspective.

## 2022-01-26 VITALS
TEMPERATURE: 98 F | DIASTOLIC BLOOD PRESSURE: 56 MMHG | HEIGHT: 63 IN | WEIGHT: 214 LBS | OXYGEN SATURATION: 98 % | BODY MASS INDEX: 37.92 KG/M2 | SYSTOLIC BLOOD PRESSURE: 129 MMHG | RESPIRATION RATE: 16 BRPM | HEART RATE: 60 BPM

## 2022-02-15 ENCOUNTER — OFFICE VISIT (OUTPATIENT)
Dept: FAMILY MEDICINE | Facility: CLINIC | Age: 72
End: 2022-02-15
Payer: MEDICARE

## 2022-02-15 VITALS
SYSTOLIC BLOOD PRESSURE: 136 MMHG | OXYGEN SATURATION: 97 % | HEART RATE: 63 BPM | BODY MASS INDEX: 38.35 KG/M2 | WEIGHT: 216.5 LBS | DIASTOLIC BLOOD PRESSURE: 84 MMHG

## 2022-02-15 DIAGNOSIS — J02.9 SORE THROAT: ICD-10-CM

## 2022-02-15 DIAGNOSIS — R51.9 NONINTRACTABLE HEADACHE, UNSPECIFIED CHRONICITY PATTERN, UNSPECIFIED HEADACHE TYPE: ICD-10-CM

## 2022-02-15 DIAGNOSIS — J32.9 SINUSITIS, UNSPECIFIED CHRONICITY, UNSPECIFIED LOCATION: Primary | ICD-10-CM

## 2022-02-15 LAB
CTP QC/QA: YES
CTP QC/QA: YES
S PYO RRNA THROAT QL PROBE: NEGATIVE
SARS-COV-2 RDRP RESP QL NAA+PROBE: NEGATIVE

## 2022-02-15 PROCEDURE — 99213 OFFICE O/P EST LOW 20 MIN: CPT | Mod: S$PBB,,, | Performed by: FAMILY MEDICINE

## 2022-02-15 PROCEDURE — U0002 COVID-19 LAB TEST NON-CDC: HCPCS | Mod: PBBFAC,PO | Performed by: FAMILY MEDICINE

## 2022-02-15 PROCEDURE — 99214 OFFICE O/P EST MOD 30 MIN: CPT | Mod: PBBFAC,PO | Performed by: FAMILY MEDICINE

## 2022-02-15 PROCEDURE — 87880 STREP A ASSAY W/OPTIC: CPT | Mod: PBBFAC,PO | Performed by: FAMILY MEDICINE

## 2022-02-15 PROCEDURE — 99213 PR OFFICE/OUTPT VISIT, EST, LEVL III, 20-29 MIN: ICD-10-PCS | Mod: S$PBB,,, | Performed by: FAMILY MEDICINE

## 2022-02-15 PROCEDURE — 99999 PR PBB SHADOW E&M-EST. PATIENT-LVL IV: ICD-10-PCS | Mod: PBBFAC,,, | Performed by: FAMILY MEDICINE

## 2022-02-15 PROCEDURE — 99999 PR PBB SHADOW E&M-EST. PATIENT-LVL IV: CPT | Mod: PBBFAC,,, | Performed by: FAMILY MEDICINE

## 2022-02-15 RX ORDER — PROMETHAZINE HYDROCHLORIDE AND DEXTROMETHORPHAN HYDROBROMIDE 6.25; 15 MG/5ML; MG/5ML
5 SYRUP ORAL EVERY 6 HOURS PRN
Qty: 120 ML | Refills: 0 | Status: SHIPPED | OUTPATIENT
Start: 2022-02-15 | End: 2022-02-25

## 2022-02-15 RX ORDER — AMOXICILLIN 875 MG/1
875 TABLET, FILM COATED ORAL 2 TIMES DAILY
Qty: 20 TABLET | Refills: 0 | Status: SHIPPED | OUTPATIENT
Start: 2022-02-15 | End: 2022-02-25

## 2022-02-15 RX ORDER — IPRATROPIUM BROMIDE 42 UG/1
2 SPRAY, METERED NASAL 3 TIMES DAILY
Qty: 15 ML | Refills: 2 | Status: SHIPPED | OUTPATIENT
Start: 2022-02-15 | End: 2022-04-25

## 2022-02-15 NOTE — PROGRESS NOTES
"Subjective:       Patient ID: Nancy Figueroa is a 71 y.o. female.    Chief Complaint: Headache, Sore Throat, and Nasal Congestion    Pt is known to me.  The pt has about a 2 week hx of sore throat, headache and congestion.  She has had ear pain and her eyes get matted.  She has much nasal congestion.  She has had a poor appetite.  She has had a "bad cough"--"spells".  She has had no fever.  She has had no nausea nor diarrhea.  The pt has been around grand kids who had COVID at the time.  She is fully vaccinated.  She took a home test recently that was negative.    Review of Systems   Constitutional: Negative for fever.   HENT: Positive for ear pain and sore throat. Negative for postnasal drip and rhinorrhea.    Respiratory: Positive for cough and shortness of breath. Negative for wheezing.    Musculoskeletal: Negative for myalgias.   Skin: Negative for rash.   Allergic/Immunologic: Negative for environmental allergies.   Neurological: Positive for headaches.       Objective:       Vitals:    02/15/22 1551   BP: 136/84   Pulse: 63   SpO2: 97%   Weight: 98.2 kg (216 lb 7.9 oz)     Physical Exam  Constitutional:       Appearance: She is well-developed.      Comments: Pt is morbidly obese   HENT:      Head: Normocephalic.      Right Ear: Tympanic membrane normal.      Left Ear: Tympanic membrane normal.      Nose: Congestion present.      Mouth/Throat:      Mouth: Mucous membranes are moist.      Pharynx: Oropharynx is clear.   Eyes:      Conjunctiva/sclera: Conjunctivae normal.      Pupils: Pupils are equal, round, and reactive to light.   Neck:      Thyroid: No thyromegaly.   Cardiovascular:      Rate and Rhythm: Normal rate and regular rhythm.      Heart sounds: Normal heart sounds.   Pulmonary:      Effort: Pulmonary effort is normal.      Breath sounds: Normal breath sounds.   Abdominal:      General: Bowel sounds are normal.      Palpations: Abdomen is soft.      Tenderness: There is no abdominal tenderness. "   Musculoskeletal:      Cervical back: Normal range of motion and neck supple.   Skin:     General: Skin is warm and dry.      Comments: Hypertrophic white hard lesion tip of left thumb   Neurological:      Mental Status: She is alert and oriented to person, place, and time.   Psychiatric:         Behavior: Behavior normal.         Assessment:       1. Sinusitis, unspecified chronicity, unspecified location    2. Nonintractable headache, unspecified chronicity pattern, unspecified headache type    3. Sore throat        Plan:       Nancy was seen today for headache, sore throat and nasal congestion.    Diagnoses and all orders for this visit:    Sinusitis, unspecified chronicity, unspecified location  -     amoxicillin (AMOXIL) 875 MG tablet; Take 1 tablet (875 mg total) by mouth 2 (two) times daily. for 10 days  -     ipratropium (ATROVENT) 42 mcg (0.06 %) nasal spray; 2 sprays by Nasal route 3 (three) times daily.  -     promethazine-dextromethorphan (PROMETHAZINE-DM) 6.25-15 mg/5 mL Syrp; Take 5 mLs by mouth every 6 (six) hours as needed (cough).    Nonintractable headache, unspecified chronicity pattern, unspecified headache type  -     POCT COVID-19 Rapid Screening    Sore throat  -     POCT Rapid Strep A  -     POCT COVID-19 Rapid Screening  -     amoxicillin (AMOXIL) 875 MG tablet; Take 1 tablet (875 mg total) by mouth 2 (two) times daily. for 10 days      During this visit, I reviewed the pt's history, medications, allergies, and problem list.

## 2022-04-20 ENCOUNTER — PES CALL (OUTPATIENT)
Dept: ADMINISTRATIVE | Facility: CLINIC | Age: 72
End: 2022-04-20
Payer: MEDICARE

## 2022-04-25 ENCOUNTER — OFFICE VISIT (OUTPATIENT)
Dept: FAMILY MEDICINE | Facility: CLINIC | Age: 72
End: 2022-04-25
Payer: MEDICARE

## 2022-04-25 VITALS
HEART RATE: 67 BPM | SYSTOLIC BLOOD PRESSURE: 132 MMHG | WEIGHT: 215.38 LBS | HEIGHT: 63 IN | OXYGEN SATURATION: 97 % | DIASTOLIC BLOOD PRESSURE: 74 MMHG | BODY MASS INDEX: 38.16 KG/M2

## 2022-04-25 DIAGNOSIS — E66.01 SEVERE OBESITY: ICD-10-CM

## 2022-04-25 DIAGNOSIS — Z00.00 ENCOUNTER FOR PREVENTIVE HEALTH EXAMINATION: Primary | ICD-10-CM

## 2022-04-25 DIAGNOSIS — I10 ESSENTIAL HYPERTENSION: ICD-10-CM

## 2022-04-25 DIAGNOSIS — I25.10 ATHEROSCLEROSIS OF NATIVE CORONARY ARTERY OF NATIVE HEART WITHOUT ANGINA PECTORIS: ICD-10-CM

## 2022-04-25 DIAGNOSIS — E03.9 HYPOTHYROIDISM, UNSPECIFIED TYPE: ICD-10-CM

## 2022-04-25 DIAGNOSIS — E78.5 DYSLIPIDEMIA: ICD-10-CM

## 2022-04-25 DIAGNOSIS — C49.A2 GASTROINTESTINAL STROMAL TUMOR (GIST) OF STOMACH: ICD-10-CM

## 2022-04-25 PROCEDURE — 99999 PR PBB SHADOW E&M-EST. PATIENT-LVL IV: CPT | Mod: PBBFAC,,, | Performed by: NURSE PRACTITIONER

## 2022-04-25 PROCEDURE — G0439 PR MEDICARE ANNUAL WELLNESS SUBSEQUENT VISIT: ICD-10-PCS | Mod: ,,, | Performed by: NURSE PRACTITIONER

## 2022-04-25 PROCEDURE — 99999 PR PBB SHADOW E&M-EST. PATIENT-LVL IV: ICD-10-PCS | Mod: PBBFAC,,, | Performed by: NURSE PRACTITIONER

## 2022-04-25 PROCEDURE — G0439 PPPS, SUBSEQ VISIT: HCPCS | Mod: ,,, | Performed by: NURSE PRACTITIONER

## 2022-04-25 PROCEDURE — 99214 OFFICE O/P EST MOD 30 MIN: CPT | Mod: PBBFAC,PO | Performed by: NURSE PRACTITIONER

## 2022-04-25 NOTE — PROGRESS NOTES
"  Nancy Figueroa presented for a  Medicare AWV and comprehensive Health Risk Assessment today. The following components were reviewed and updated:    · Medical history  · Family History  · Social history  · Allergies and Current Medications  · Health Risk Assessment  · Health Maintenance  · Care Team     ** See Completed Assessments for Annual Wellness Visit within the encounter summary.**     The following assessments were completed:  · Living Situation  · CAGE  · Depression Screening  · Timed Get Up and Go  · Whisper Test  · Cognitive Function Screening    · Nutrition Screening  · ADL Screening  · PAQ Screening    Vitals:    04/25/22 0918   BP: 132/74   BP Location: Left arm   Patient Position: Sitting   BP Method: Medium (Manual)   Pulse: 67   SpO2: 97%   Weight: 97.7 kg (215 lb 6.2 oz)   Height: 5' 3" (1.6 m)     Body mass index is 38.15 kg/m².  Physical Exam  Vitals reviewed.   Constitutional:       Appearance: Normal appearance.   Cardiovascular:      Rate and Rhythm: Normal rate and regular rhythm.      Pulses: Normal pulses.      Heart sounds: Normal heart sounds.   Pulmonary:      Effort: Pulmonary effort is normal.      Breath sounds: Normal breath sounds.   Skin:     General: Skin is dry.   Neurological:      Mental Status: She is alert and oriented to person, place, and time.       Diagnoses and health risks identified today and associated recommendations/orders:    1. Encounter for preventive health examination  Reviewed and discussed health maintenance.      2. Essential hypertension  Stable- continue current treatment and follow up routinely with PCP     3. Atherosclerosis of native coronary artery of native heart without angina pectoris  Stable- continue current treatment and follow up routinely with PCP     4. Dyslipidemia  Stable- continue current treatment and follow up routinely with PCP     5. Gastrointestinal stromal tumor (GIST) of stomach  Stable- continue current treatment and follow up " routinely with PCP     6. Hypothyroidism, unspecified type  Stable- continue current treatment and follow up routinely with PCP     7. Severe obesity  Encouraged healthy eating, weight loss and routine exercise       Provided Nancy with a 5-10 year written screening schedule and personal prevention plan. Recommendations were developed using the USPSTF age appropriate recommendations. Education, counseling, and referrals were provided as needed. After Visit Summary printed and given to patient which includes a list of additional screenings\tests needed.    I offered to discuss advanced care planning, including how to pick a person who would make decisions for you if you were unable to make them for yourself, called a health care power of , and what kind of decisions you might make such as use of life sustaining treatments such as ventilators and tube feeding when faced with a life limiting illness recorded on a living will that they will need to know. (How you want to be cared for as you near the end of your natural life)     X Patient is interested in learning more about how to make advanced directives.  I provided them paperwork and offered to discuss this with them.    Kimberly Gil, NP

## 2022-04-25 NOTE — PATIENT INSTRUCTIONS
Counseling and Referral of Other Preventative  (Italic type indicates deductible and co-insurance are waived)    Patient Name: Nancy Figueroa  Today's Date: 4/25/2022    Health Maintenance       Date Due Completion Date    COVID-19 Vaccine (1) Never done ---    Shingles Vaccine (1 of 2) Never done ---    Mammogram 10/26/2022 10/26/2021    Override on 10/18/2012: (N/S)    High Dose Statin 02/15/2023 2/15/2022    Aspirin/Antiplatelet Therapy 02/15/2023 2/15/2022    DEXA Scan 06/24/2024 6/24/2021    Lipid Panel 08/10/2026 8/10/2021    TETANUS VACCINE 03/06/2027 3/6/2017    Colorectal Cancer Screening 01/25/2032 1/25/2022    Override on 10/18/2012: (N/S)        No orders of the defined types were placed in this encounter.    The following information is provided to all patients.  This information is to help you find resources for any of the problems found today that may be affecting your health:                Living healthy guide: www.[facility].[place].gov      Understanding Diabetes: www.diabetes.org      Eating healthy: www.cdc.gov/healthyweight      CDC home safety checklist: www.cdc.gov/steadi/patient.html      Agency on Aging: www.goea.[place].[facility]      Alcoholics anonymous (AA): www.aa.org      Physical Activity: www.[place].nih.gov/kc9ugcm      Tobacco use: www.quitwithusla.org

## 2022-05-04 ENCOUNTER — PATIENT MESSAGE (OUTPATIENT)
Dept: BARIATRICS | Facility: CLINIC | Age: 72
End: 2022-05-04
Payer: MEDICARE

## 2022-05-09 ENCOUNTER — PATIENT MESSAGE (OUTPATIENT)
Dept: SMOKING CESSATION | Facility: CLINIC | Age: 72
End: 2022-05-09
Payer: MEDICARE

## 2022-06-27 ENCOUNTER — IMMUNIZATION (OUTPATIENT)
Dept: PHARMACY | Facility: CLINIC | Age: 72
End: 2022-06-27
Payer: MEDICARE

## 2022-06-27 DIAGNOSIS — Z23 NEED FOR VACCINATION: Primary | ICD-10-CM

## 2022-08-04 DIAGNOSIS — Z95.5 STENTED CORONARY ARTERY: Chronic | ICD-10-CM

## 2022-08-04 DIAGNOSIS — I10 ESSENTIAL HYPERTENSION: ICD-10-CM

## 2022-08-04 DIAGNOSIS — E78.5 DYSLIPIDEMIA: ICD-10-CM

## 2022-08-04 DIAGNOSIS — I25.2 HISTORY OF NON-ST ELEVATION MYOCARDIAL INFARCTION (NSTEMI): ICD-10-CM

## 2022-08-04 DIAGNOSIS — I25.10 ATHEROSCLEROSIS OF NATIVE CORONARY ARTERY OF NATIVE HEART WITHOUT ANGINA PECTORIS: ICD-10-CM

## 2022-08-05 RX ORDER — ATORVASTATIN CALCIUM 10 MG/1
TABLET, FILM COATED ORAL
Qty: 90 TABLET | Refills: 4 | Status: SHIPPED | OUTPATIENT
Start: 2022-08-05 | End: 2023-10-19 | Stop reason: SDUPTHER

## 2022-08-05 RX ORDER — AMLODIPINE BESYLATE 2.5 MG/1
TABLET ORAL
Qty: 90 TABLET | Refills: 4 | Status: SHIPPED | OUTPATIENT
Start: 2022-08-05 | End: 2023-01-18 | Stop reason: SDUPTHER

## 2022-08-08 ENCOUNTER — LAB VISIT (OUTPATIENT)
Dept: LAB | Facility: HOSPITAL | Age: 72
End: 2022-08-08
Attending: INTERNAL MEDICINE
Payer: MEDICARE

## 2022-08-08 DIAGNOSIS — E78.5 DYSLIPIDEMIA: ICD-10-CM

## 2022-08-08 DIAGNOSIS — I25.10 ATHEROSCLEROSIS OF NATIVE CORONARY ARTERY OF NATIVE HEART WITHOUT ANGINA PECTORIS: ICD-10-CM

## 2022-08-08 DIAGNOSIS — I25.2 HISTORY OF NON-ST ELEVATION MYOCARDIAL INFARCTION (NSTEMI): ICD-10-CM

## 2022-08-08 DIAGNOSIS — Z95.5 STENTED CORONARY ARTERY: Chronic | ICD-10-CM

## 2022-08-08 DIAGNOSIS — I10 ESSENTIAL HYPERTENSION: ICD-10-CM

## 2022-08-08 LAB
ALBUMIN SERPL BCP-MCNC: 3.6 G/DL (ref 3.5–5.2)
ALP SERPL-CCNC: 107 U/L (ref 55–135)
ALT SERPL W/O P-5'-P-CCNC: 18 U/L (ref 10–44)
ANION GAP SERPL CALC-SCNC: 5 MMOL/L (ref 8–16)
AST SERPL-CCNC: 18 U/L (ref 10–40)
BILIRUB SERPL-MCNC: 1.7 MG/DL (ref 0.1–1)
BUN SERPL-MCNC: 14 MG/DL (ref 8–23)
CALCIUM SERPL-MCNC: 9.2 MG/DL (ref 8.7–10.5)
CHLORIDE SERPL-SCNC: 108 MMOL/L (ref 95–110)
CHOLEST SERPL-MCNC: 141 MG/DL (ref 120–199)
CHOLEST/HDLC SERPL: 2 {RATIO} (ref 2–5)
CO2 SERPL-SCNC: 27 MMOL/L (ref 23–29)
CREAT SERPL-MCNC: 0.7 MG/DL (ref 0.5–1.4)
EST. GFR  (NO RACE VARIABLE): >60 ML/MIN/1.73 M^2
GLUCOSE SERPL-MCNC: 90 MG/DL (ref 70–110)
HDLC SERPL-MCNC: 69 MG/DL (ref 40–75)
HDLC SERPL: 48.9 % (ref 20–50)
LDLC SERPL CALC-MCNC: 59 MG/DL (ref 63–159)
NONHDLC SERPL-MCNC: 72 MG/DL
POTASSIUM SERPL-SCNC: 4 MMOL/L (ref 3.5–5.1)
PROT SERPL-MCNC: 7 G/DL (ref 6–8.4)
SODIUM SERPL-SCNC: 140 MMOL/L (ref 136–145)
TRIGL SERPL-MCNC: 65 MG/DL (ref 30–150)

## 2022-08-08 PROCEDURE — 80061 LIPID PANEL: CPT | Performed by: INTERNAL MEDICINE

## 2022-08-08 PROCEDURE — 36415 COLL VENOUS BLD VENIPUNCTURE: CPT | Mod: PO | Performed by: INTERNAL MEDICINE

## 2022-08-08 PROCEDURE — 80053 COMPREHEN METABOLIC PANEL: CPT | Performed by: INTERNAL MEDICINE

## 2022-08-22 ENCOUNTER — OFFICE VISIT (OUTPATIENT)
Dept: CARDIOLOGY | Facility: CLINIC | Age: 72
End: 2022-08-22
Payer: MEDICARE

## 2022-08-22 VITALS
HEIGHT: 64 IN | WEIGHT: 225.75 LBS | BODY MASS INDEX: 38.54 KG/M2 | HEART RATE: 71 BPM | DIASTOLIC BLOOD PRESSURE: 80 MMHG | SYSTOLIC BLOOD PRESSURE: 149 MMHG

## 2022-08-22 DIAGNOSIS — E78.5 DYSLIPIDEMIA: ICD-10-CM

## 2022-08-22 DIAGNOSIS — I10 ESSENTIAL HYPERTENSION: ICD-10-CM

## 2022-08-22 DIAGNOSIS — I25.2 HISTORY OF NON-ST ELEVATION MYOCARDIAL INFARCTION (NSTEMI): Primary | ICD-10-CM

## 2022-08-22 DIAGNOSIS — I20.9 ANGINA PECTORIS, UNSPECIFIED: ICD-10-CM

## 2022-08-22 PROCEDURE — 99999 PR PBB SHADOW E&M-EST. PATIENT-LVL III: CPT | Mod: PBBFAC,,, | Performed by: INTERNAL MEDICINE

## 2022-08-22 PROCEDURE — 99213 OFFICE O/P EST LOW 20 MIN: CPT | Mod: PBBFAC,PO | Performed by: INTERNAL MEDICINE

## 2022-08-22 PROCEDURE — 99214 PR OFFICE/OUTPT VISIT, EST, LEVL IV, 30-39 MIN: ICD-10-PCS | Mod: S$PBB,,, | Performed by: INTERNAL MEDICINE

## 2022-08-22 PROCEDURE — 99999 PR PBB SHADOW E&M-EST. PATIENT-LVL III: ICD-10-PCS | Mod: PBBFAC,,, | Performed by: INTERNAL MEDICINE

## 2022-08-22 PROCEDURE — 99214 OFFICE O/P EST MOD 30 MIN: CPT | Mod: S$PBB,,, | Performed by: INTERNAL MEDICINE

## 2022-08-22 NOTE — PROGRESS NOTES
Subjective:    Patient ID:  Nancy Figueroa is a 72 y.o. female who presents for follow-up of cad    HPI  She comes with no complaints, no chest pain, no shortness of breath   BP normal at home  Not very active      Review of Systems   Constitutional: Negative for decreased appetite, malaise/fatigue, weight gain and weight loss.   Cardiovascular: Negative for chest pain, dyspnea on exertion, leg swelling, palpitations and syncope.   Respiratory: Negative for cough and shortness of breath.    Gastrointestinal: Negative.    Neurological: Negative for weakness.   All other systems reviewed and are negative.       Objective:      Physical Exam  Vitals and nursing note reviewed.   Constitutional:       Appearance: Normal appearance. She is well-developed.   HENT:      Head: Normocephalic.   Eyes:      Pupils: Pupils are equal, round, and reactive to light.   Neck:      Thyroid: No thyromegaly.      Vascular: No carotid bruit or JVD.   Cardiovascular:      Rate and Rhythm: Normal rate and regular rhythm.      Chest Wall: PMI is not displaced.      Pulses: Normal pulses and intact distal pulses.      Heart sounds: Normal heart sounds. No murmur heard.    No gallop.   Pulmonary:      Effort: Pulmonary effort is normal.      Breath sounds: Normal breath sounds.   Abdominal:      Palpations: Abdomen is soft. There is no mass.      Tenderness: There is no abdominal tenderness.   Musculoskeletal:         General: Normal range of motion.      Cervical back: Normal range of motion and neck supple.   Skin:     General: Skin is warm.   Neurological:      Mental Status: She is alert and oriented to person, place, and time.      Sensory: No sensory deficit.      Deep Tendon Reflexes: Reflexes are normal and symmetric.           Assessment:       1. History of non-ST elevation myocardial infarction (NSTEMI)    2. Essential hypertension    3. Dyslipidemia         Plan:     Continue all cardiac medications  Regular exercise  program  Weight loss  One year follow-up with a stress echo

## 2022-10-15 ENCOUNTER — TELEPHONE (OUTPATIENT)
Dept: RADIOLOGY | Facility: HOSPITAL | Age: 72
End: 2022-10-15
Payer: MEDICARE

## 2022-10-24 ENCOUNTER — IMMUNIZATION (OUTPATIENT)
Dept: FAMILY MEDICINE | Facility: CLINIC | Age: 72
End: 2022-10-24
Payer: MEDICARE

## 2022-10-24 PROCEDURE — G0008 ADMIN INFLUENZA VIRUS VAC: HCPCS | Mod: PBBFAC,PO

## 2022-10-24 PROCEDURE — 90694 VACC AIIV4 NO PRSRV 0.5ML IM: CPT | Mod: PBBFAC,PO

## 2022-11-07 ENCOUNTER — HOSPITAL ENCOUNTER (OUTPATIENT)
Dept: RADIOLOGY | Facility: HOSPITAL | Age: 72
Discharge: HOME | End: 2022-11-07
Attending: FAMILY MEDICINE
Payer: MEDICARE

## 2022-11-07 DIAGNOSIS — Z12.31 ENCOUNTER FOR SCREENING MAMMOGRAM FOR MALIGNANT NEOPLASM OF BREAST: ICD-10-CM

## 2022-11-07 PROCEDURE — 77063 BREAST TOMOSYNTHESIS BI: CPT | Mod: 26,,, | Performed by: RADIOLOGY

## 2022-11-07 PROCEDURE — 77067 MAMMO DIGITAL SCREENING BILAT WITH TOMO: ICD-10-PCS | Mod: 26,,, | Performed by: RADIOLOGY

## 2022-11-07 PROCEDURE — 77067 SCR MAMMO BI INCL CAD: CPT | Mod: 26,,, | Performed by: RADIOLOGY

## 2022-11-07 PROCEDURE — 77063 BREAST TOMOSYNTHESIS BI: CPT | Mod: TC,PO

## 2022-11-07 PROCEDURE — 77067 SCR MAMMO BI INCL CAD: CPT | Mod: TC,PO

## 2022-11-07 PROCEDURE — 77063 MAMMO DIGITAL SCREENING BILAT WITH TOMO: ICD-10-PCS | Mod: 26,,, | Performed by: RADIOLOGY

## 2022-12-12 ENCOUNTER — PATIENT MESSAGE (OUTPATIENT)
Dept: ADMINISTRATIVE | Facility: HOSPITAL | Age: 72
End: 2022-12-12
Payer: MEDICARE

## 2022-12-19 ENCOUNTER — PATIENT MESSAGE (OUTPATIENT)
Dept: ADMINISTRATIVE | Facility: HOSPITAL | Age: 72
End: 2022-12-19
Payer: MEDICARE

## 2023-01-01 ENCOUNTER — PATIENT MESSAGE (OUTPATIENT)
Dept: ADMINISTRATIVE | Facility: OTHER | Age: 73
End: 2023-01-01
Payer: MEDICARE

## 2023-02-01 ENCOUNTER — PATIENT MESSAGE (OUTPATIENT)
Dept: ADMINISTRATIVE | Facility: OTHER | Age: 73
End: 2023-02-01
Payer: MEDICARE

## 2023-03-29 ENCOUNTER — TELEPHONE (OUTPATIENT)
Dept: FAMILY MEDICINE | Facility: CLINIC | Age: 73
End: 2023-03-29
Payer: MEDICARE

## 2023-07-14 DIAGNOSIS — E03.9 HYPOTHYROIDISM, UNSPECIFIED TYPE: ICD-10-CM

## 2023-07-14 NOTE — TELEPHONE ENCOUNTER
Refill Routing Note   Medication(s) are not appropriate for processing by [facility] for the following reason(s):      Patient not seen by provider within 15 months  Required labs outdated    [facility] action(s):  Defer Care Due:  None identified            Appointments  past 12m or future 3m with PCP    Date Provider   Last Visit   2/15/2022 KAREEM Pantoja MD   Next Visit   7/17/2023 KAREEM Pantoja MD   ED visits in past 90 days: 0        Note composed:10:18 AM 07/14/2023

## 2023-07-14 NOTE — TELEPHONE ENCOUNTER
No care due was identified.  Health [facility] Embedded Care Due Messages. Reference number: [id number].   7/14/2023 6:09:20 AM CDT

## 2023-07-15 RX ORDER — LEVOTHYROXINE SODIUM 112 UG/1
TABLET ORAL
Qty: 90 TABLET | Refills: 3 | Status: SHIPPED | OUTPATIENT
Start: 2023-07-15

## 2023-07-17 ENCOUNTER — OFFICE VISIT (OUTPATIENT)
Dept: FAMILY MEDICINE | Facility: CLINIC | Age: 73
End: 2023-07-17
Payer: MEDICARE

## 2023-07-17 VITALS
DIASTOLIC BLOOD PRESSURE: 84 MMHG | BODY MASS INDEX: 42.83 KG/M2 | OXYGEN SATURATION: 96 % | HEART RATE: 69 BPM | HEIGHT: 64 IN | SYSTOLIC BLOOD PRESSURE: 130 MMHG | WEIGHT: 250.88 LBS

## 2023-07-17 DIAGNOSIS — B35.1 TOENAIL FUNGUS: ICD-10-CM

## 2023-07-17 DIAGNOSIS — Z12.83 SKIN EXAM, SCREENING FOR CANCER: ICD-10-CM

## 2023-07-17 DIAGNOSIS — E03.9 ACQUIRED HYPOTHYROIDISM: ICD-10-CM

## 2023-07-17 DIAGNOSIS — E78.5 DYSLIPIDEMIA: ICD-10-CM

## 2023-07-17 DIAGNOSIS — R91.1 PULMONARY NODULE: ICD-10-CM

## 2023-07-17 DIAGNOSIS — I25.2 HISTORY OF NON-ST ELEVATION MYOCARDIAL INFARCTION (NSTEMI): ICD-10-CM

## 2023-07-17 DIAGNOSIS — Z90.3 S/P GASTRIC SLEEVE PROCEDURE: ICD-10-CM

## 2023-07-17 DIAGNOSIS — I10 ESSENTIAL HYPERTENSION: Primary | ICD-10-CM

## 2023-07-17 DIAGNOSIS — Z79.899 HIGH RISK MEDICATION USE: ICD-10-CM

## 2023-07-17 DIAGNOSIS — I25.10 ATHEROSCLEROSIS OF NATIVE CORONARY ARTERY OF NATIVE HEART WITHOUT ANGINA PECTORIS: ICD-10-CM

## 2023-07-17 DIAGNOSIS — Z85.09 HISTORY OF GASTROINTESTINAL STROMAL TUMOR (GIST): ICD-10-CM

## 2023-07-17 PROCEDURE — 99999 PR PBB SHADOW E&M-EST. PATIENT-LVL IV: ICD-10-PCS | Mod: PBBFAC,,, | Performed by: FAMILY MEDICINE

## 2023-07-17 PROCEDURE — 99999 PR PBB SHADOW E&M-EST. PATIENT-LVL IV: CPT | Mod: PBBFAC,,, | Performed by: FAMILY MEDICINE

## 2023-07-17 PROCEDURE — 99214 OFFICE O/P EST MOD 30 MIN: CPT | Mod: PBBFAC,PO | Performed by: FAMILY MEDICINE

## 2023-07-17 PROCEDURE — 99215 OFFICE O/P EST HI 40 MIN: CPT | Mod: S$PBB,,, | Performed by: FAMILY MEDICINE

## 2023-07-17 PROCEDURE — 99215 PR OFFICE/OUTPT VISIT, EST, LEVL V, 40-54 MIN: ICD-10-PCS | Mod: S$PBB,,, | Performed by: FAMILY MEDICINE

## 2023-07-17 RX ORDER — TERBINAFINE HYDROCHLORIDE 250 MG/1
250 TABLET ORAL DAILY
Qty: 90 TABLET | Refills: 0 | Status: SHIPPED | OUTPATIENT
Start: 2023-07-17 | End: 2023-10-15

## 2023-07-17 NOTE — PROGRESS NOTES
Subjective:       Patient ID: Nancy Figueroa is a 73 y.o. female.    Chief Complaint: Annual Exam    Pt is known to me.  Pt is here for followup of chronic medical issues.  The pt has a hx of a pulmonary nodule that was followed by Lorraine but he has moved.  The pt has had no follow up since 2021 when a 6 month follow up was recommended.  The pt has a hx of actinic keratoses and needs dermatology follow up.  She is seeing Dr. Taylor for CAD s/p stents.  She is doing well.    She has toenail fungus.  She has taken 90 days of Diflucan in the past and it did not totally clear up.  The pt is followed by Dr. Mathias after gastric sleeve and for history of GIST.  She is due for follow up.  Otherwise the pt is doing well.   [facility] and is [facility].   Discussed health maintenance with pt and will schedule appropriate studies and/or immunizations.      Review of Systems   Constitutional:  Negative for activity change, appetite change, fatigue and unexpected weight change.   Eyes:  Negative for visual disturbance.   Respiratory:  Negative for cough, chest tightness and shortness of breath.    Cardiovascular:  Negative for chest pain, palpitations and leg swelling.   Gastrointestinal:  Negative for abdominal pain, constipation, diarrhea, nausea and vomiting.   Endocrine: Negative for cold intolerance, heat intolerance and polyuria.   Genitourinary:  Negative for decreased urine volume and dysuria.   Musculoskeletal:  Negative for arthralgias and back pain.   Skin:  Negative for rash.   Neurological:  Negative for numbness and headaches.     Objective:      Physical Exam  Vitals and nursing note reviewed.       Assessment:       1. Essential hypertension    2. Dyslipidemia    3. Atherosclerosis of native coronary artery of native heart without angina pectoris    4. History of non-ST elevation myocardial infarction (NSTEMI)    5. BMI 40.0-44.9, adult    6. Acquired hypothyroidism    7. S/P gastric sleeve procedure     8. History of gastrointestinal stromal tumor (GIST)    9. Pulmonary nodule    10. Skin exam, screening for cancer    11. Toenail fungus    12. High risk medication use        Plan:       Nancy was seen today for annual exam.    Diagnoses and all orders for this visit:    Essential hypertension  -     CBC Auto Differential; Future  -     Comprehensive Metabolic Panel; Future    Dyslipidemia  -     Comprehensive Metabolic Panel; Future  -     Lipid Panel; Future    Atherosclerosis of native coronary artery of native heart without angina pectoris    History of non-ST elevation myocardial infarction (NSTEMI)    BMI 40.0-44.9, adult    Acquired hypothyroidism  -     TSH; Future    S/P gastric sleeve procedure  -     Ambulatory referral/consult to General Surgery; Future    History of gastrointestinal stromal tumor (GIST)  -     Ambulatory referral/consult to General Surgery; Future    Pulmonary nodule  -     CT Chest Without Contrast; Future    Skin exam, screening for cancer  -     Ambulatory referral/consult to Dermatology; Future    Toenail fungus  -     terbinafine HCL (LAMISIL) 250 mg tablet; Take 1 tablet (250 mg total) by mouth once daily.  -     Hepatic Function Panel; Future    High risk medication use  -     Hepatic Function Panel; Future      During this visit, I reviewed the pt's history, medications, allergies, and problem list.

## 2023-07-18 ENCOUNTER — TELEPHONE (OUTPATIENT)
Dept: BARIATRICS | Facility: CLINIC | Age: 73
End: 2023-07-18
Payer: MEDICARE

## 2023-07-18 ENCOUNTER — TELEPHONE (OUTPATIENT)
Dept: SURGERY | Facility: CLINIC | Age: 73
End: 2023-07-18
Payer: MEDICARE

## 2023-07-18 NOTE — TELEPHONE ENCOUNTER
Called pt to reschedule appt. Pt accepted sooner appt in [place].     ----- Message from Rosa Johnston LPN sent at 7/18/2023 10:52 AM CDT -----  Regarding: APPOINTMENT LOCATION CHANGE  I have scheduled patient in first-available appointment slot,[facility]2023  [facility] and have added to wait-list.     If you could review chart and contact patient, for [facility] appointment, your help is appreciated.    No [place] appointments offered for me to schedule.     Thanks,  Diane [facility]

## 2023-07-18 NOTE — TELEPHONE ENCOUNTER
GenSur referral scheduled with patient: 8/29/2023  [facility]. Patient prefers [facility]. Staff message sent to Stew asking for [place] appointment as none were offered to this nurse.

## 2023-07-25 ENCOUNTER — HOSPITAL ENCOUNTER (OUTPATIENT)
Dept: RADIOLOGY | Facility: HOSPITAL | Age: 73
Discharge: HOME | End: 2023-07-25
Attending: FAMILY MEDICINE
Payer: MEDICARE

## 2023-07-25 DIAGNOSIS — R91.1 PULMONARY NODULE: ICD-10-CM

## 2023-07-25 PROCEDURE — 71250 CT THORAX DX C-: CPT | Mod: TC,PO

## 2023-07-25 PROCEDURE — 71250 CT THORAX DX C-: CPT | Mod: 26,,, | Performed by: RADIOLOGY

## 2023-07-25 PROCEDURE — 71250 CT CHEST WITHOUT CONTRAST: ICD-10-PCS | Mod: 26,,, | Performed by: RADIOLOGY

## 2023-08-10 ENCOUNTER — CLINICAL SUPPORT (OUTPATIENT)
Dept: CARDIOLOGY | Facility: HOSPITAL | Age: 73
End: 2023-08-10
Attending: INTERNAL MEDICINE
Payer: MEDICARE

## 2023-08-10 VITALS — WEIGHT: 250 LBS | HEIGHT: 63 IN | BODY MASS INDEX: 44.3 KG/M2

## 2023-08-10 DIAGNOSIS — E78.5 DYSLIPIDEMIA: ICD-10-CM

## 2023-08-10 DIAGNOSIS — I25.2 HISTORY OF NON-ST ELEVATION MYOCARDIAL INFARCTION (NSTEMI): ICD-10-CM

## 2023-08-10 DIAGNOSIS — I10 ESSENTIAL HYPERTENSION: ICD-10-CM

## 2023-08-10 DIAGNOSIS — I20.9 ANGINA PECTORIS, UNSPECIFIED: ICD-10-CM

## 2023-08-10 LAB
ASCENDING AORTA: 2.84 CM
AV INDEX (PROSTH): 0.87
AV MEAN GRADIENT: 6 MMHG
AV PEAK GRADIENT: 11 MMHG
AV VALVE AREA BY VELOCITY RATIO: 2.8 CM²
AV VALVE AREA: 2.93 CM²
AV VELOCITY RATIO: 0.83
BSA FOR ECHO PROCEDURE: 2.24 M2
CV ECHO LV RWT: 0.47 CM
CV STRESS BASE HR: 76 BPM
DIASTOLIC BLOOD PRESSURE: 69 MMHG
DOP CALC AO PEAK VEL: 1.67 M/S
DOP CALC AO VTI: 36.7 CM
DOP CALC LVOT AREA: 3.4 CM2
DOP CALC LVOT DIAMETER: 2.07 CM
DOP CALC LVOT PEAK VEL: 1.39 M/S
DOP CALC LVOT STROKE VOLUME: 107.64 CM3
DOP CALCLVOT PEAK VEL VTI: 32 CM
E WAVE DECELERATION TIME: 177.14 MSEC
E/A RATIO: 1.05
E/E' RATIO: 10.11 M/S
ECHO LV POSTERIOR WALL: 1.02 CM (ref 0.6–1.1)
FRACTIONAL SHORTENING: 43 % (ref 28–44)
INTERVENTRICULAR SEPTUM: 0.94 CM (ref 0.6–1.1)
LA MAJOR: 4.98 CM
LA MINOR: 4.57 CM
LA WIDTH: 4 CM
LEFT ATRIUM SIZE: 3.27 CM
LEFT ATRIUM VOLUME INDEX: 24.9 ML/M2
LEFT ATRIUM VOLUME: 52.99 CM3
LEFT INTERNAL DIMENSION IN SYSTOLE: 2.48 CM (ref 2.1–4)
LEFT VENTRICLE DIASTOLIC VOLUME INDEX: 39.69 ML/M2
LEFT VENTRICLE DIASTOLIC VOLUME: 84.54 ML
LEFT VENTRICLE MASS INDEX: 66 G/M2
LEFT VENTRICLE SYSTOLIC VOLUME INDEX: 10.3 ML/M2
LEFT VENTRICLE SYSTOLIC VOLUME: 21.88 ML
LEFT VENTRICULAR INTERNAL DIMENSION IN DIASTOLE: 4.33 CM (ref 3.5–6)
LEFT VENTRICULAR MASS: 140.11 G
LV LATERAL E/E' RATIO: 9.1 M/S
LV SEPTAL E/E' RATIO: 11.38 M/S
LVOT MG: 3.95 MMHG
LVOT MV: 0.92 CM/S
MV PEAK A VEL: 0.87 M/S
MV PEAK E VEL: 0.91 M/S
MV STENOSIS PRESSURE HALF TIME: 51.37 MS
MV VALVE AREA P 1/2 METHOD: 4.28 CM2
OHS CV CPX 1 MINUTE RECOVERY HEART RATE: 83 BPM
OHS CV CPX 85 PERCENT MAX PREDICTED HEART RATE MALE: 122
OHS CV CPX ESTIMATED METS: 7
OHS CV CPX MAX PREDICTED HEART RATE: 144
OHS CV CPX PATIENT IS FEMALE: 1
OHS CV CPX PATIENT IS MALE: 0
OHS CV CPX PEAK DIASTOLIC BLOOD PRESSURE: 79 MMHG
OHS CV CPX PEAK HEAR RATE: 116 BPM
OHS CV CPX PEAK RATE PRESSURE PRODUCT: NORMAL
OHS CV CPX PEAK SYSTOLIC BLOOD PRESSURE: 212 MMHG
OHS CV CPX PERCENT MAX PREDICTED HEART RATE ACHIEVED: 81
OHS CV CPX RATE PRESSURE PRODUCT PRESENTING: 8740
PISA TR MAX VEL: 2.61 M/S
PULM VEIN S/D RATIO: 1.15
PV PEAK D VEL: 0.47 M/S
PV PEAK S VEL: 0.54 M/S
RA MAJOR: 3.63 CM
RA PRESSURE ESTIMATED: 3 MMHG
RIGHT VENTRICULAR END-DIASTOLIC DIMENSION: 3.42 CM
RIGHT VENTRICULAR LENGTH IN DIASTOLE (APICAL 4-CHAMBER VIEW): 6.07 CM
RV MID DIAMA: 1.79 CM
RV TB RVSP: 6 MMHG
RV TISSUE DOPPLER FREE WALL SYSTOLIC VELOCITY 1 (APICAL 4 CHAMBER VIEW): 7.89 CM/S
SINUS: 2.6 CM
STJ: 2.51 CM
STRESS ECHO POST EXERCISE DUR MIN: 3 MINUTES
STRESS ECHO POST EXERCISE DUR SEC: 58 SECONDS
SYSTOLIC BLOOD PRESSURE: 115 MMHG
TDI LATERAL: 0.1 M/S
TDI SEPTAL: 0.08 M/S
TDI: 0.09 M/S
TR MAX PG: 27 MMHG
TRICUSPID ANNULAR PLANE SYSTOLIC EXCURSION: 1.87 CM
TV REST PULMONARY ARTERY PRESSURE: 30 MMHG
Z-SCORE OF LEFT VENTRICULAR DIMENSION IN END DIASTOLE: -4.5
Z-SCORE OF LEFT VENTRICULAR DIMENSION IN END SYSTOLE: -4.05

## 2023-08-10 PROCEDURE — 93325 STRESS ECHO (CUPID ONLY): ICD-10-PCS | Mod: 26,,, | Performed by: INTERNAL MEDICINE

## 2023-08-10 PROCEDURE — 93351 STRESS TTE COMPLETE: CPT | Mod: 26,,, | Performed by: INTERNAL MEDICINE

## 2023-08-10 PROCEDURE — 93351 STRESS TTE COMPLETE: CPT | Mod: PO

## 2023-08-10 PROCEDURE — 93320 STRESS ECHO (CUPID ONLY): ICD-10-PCS | Mod: 26,,, | Performed by: INTERNAL MEDICINE

## 2023-08-10 PROCEDURE — 93320 DOPPLER ECHO COMPLETE: CPT | Mod: 26,,, | Performed by: INTERNAL MEDICINE

## 2023-08-10 PROCEDURE — 93325 DOPPLER ECHO COLOR FLOW MAPG: CPT | Mod: 26,,, | Performed by: INTERNAL MEDICINE

## 2023-08-10 PROCEDURE — 93351 STRESS ECHO (CUPID ONLY): ICD-10-PCS | Mod: 26,,, | Performed by: INTERNAL MEDICINE

## 2023-08-11 ENCOUNTER — OFFICE VISIT (OUTPATIENT)
Dept: SURGERY | Facility: CLINIC | Age: 73
End: 2023-08-11
Payer: MEDICARE

## 2023-08-11 VITALS
HEART RATE: 71 BPM | BODY MASS INDEX: 41.25 KG/M2 | HEIGHT: 64 IN | DIASTOLIC BLOOD PRESSURE: 91 MMHG | WEIGHT: 241.63 LBS | SYSTOLIC BLOOD PRESSURE: 127 MMHG | RESPIRATION RATE: 16 BRPM | TEMPERATURE: 98 F

## 2023-08-11 DIAGNOSIS — I10 ESSENTIAL HYPERTENSION: Primary | ICD-10-CM

## 2023-08-11 DIAGNOSIS — Z85.09 HISTORY OF GASTROINTESTINAL STROMAL TUMOR (GIST): ICD-10-CM

## 2023-08-11 DIAGNOSIS — Z90.3 S/P GASTRIC SLEEVE PROCEDURE: ICD-10-CM

## 2023-08-11 PROCEDURE — 99213 PR OFFICE/OUTPT VISIT, EST, LEVL III, 20-29 MIN: ICD-10-PCS | Mod: S$PBB,,, | Performed by: SURGERY

## 2023-08-11 PROCEDURE — 99213 OFFICE O/P EST LOW 20 MIN: CPT | Mod: S$PBB,,, | Performed by: SURGERY

## 2023-08-11 PROCEDURE — 99999 PR PBB SHADOW E&M-EST. PATIENT-LVL V: ICD-10-PCS | Mod: PBBFAC,,, | Performed by: SURGERY

## 2023-08-11 PROCEDURE — 99999 PR PBB SHADOW E&M-EST. PATIENT-LVL V: CPT | Mod: PBBFAC,,, | Performed by: SURGERY

## 2023-08-11 PROCEDURE — 99215 OFFICE O/P EST HI 40 MIN: CPT | Mod: PBBFAC,PO | Performed by: SURGERY

## 2023-08-11 RX ORDER — TOPIRAMATE 25 MG/1
25 TABLET ORAL 2 TIMES DAILY
Qty: 60 TABLET | Refills: 0 | Status: SHIPPED | OUTPATIENT
Start: 2023-08-11 | End: 2023-09-22 | Stop reason: SDUPTHER

## 2023-08-11 NOTE — PROGRESS NOTES
Post Op Note    Surgery: gastric sleeve surgery with hiatal hernia repair   Date: 1/27/20  Initial weight: 262  Last weight: 213  Current weight: 241    Constipation: none  Reflux: some heartburn- did scope  Vomiting: none    Diet:    Eating more salads  Started weight watchers  Uses some croutons    Exercise:  Started water aerobics 3 days per week     MVI: daily mvi    Vitals:    08/11/23 0907   BP: (!) 127/91   Pulse: 71   Resp: 16   Temp: 97.6 °F (36.4 °C)       Body comp:  Fat Percent:  55.6 %  Fat Mass:  134.4 lb  FFM:  107.2 lb  TBW: 77.8 lb  TBW %:  32.2 %  BMR: 1572 kcal    PE:  NAD  RRR  Soft/nt/nd    Labs: none    A/P: s/p sleeve    Hiatal hernia- recurred on last CT scan- follow up endoscopy- appears asymptomatic  Gist- heme onc  follow up, endoscopy     Counseling for patient:    Diet: doing well with weight watchers- will add topamax for continue appetite suppression, went over side effects  Exercise: continue pool exercising  Vitamins: daily mvi    Co morbidities:     1. Essential hypertension        2. S/P gastric sleeve procedure  Ambulatory referral/consult to General Surgery      3. History of gastrointestinal stromal tumor (GIST)  Ambulatory referral/consult to General Surgery    Ambulatory referral/consult to Gastroenterology    Ambulatory referral/consult to Oncology          -All above: Evaluated, monitored, and treated with diet and exercise program.

## 2023-08-15 ENCOUNTER — TELEPHONE (OUTPATIENT)
Dept: HEMATOLOGY/ONCOLOGY | Facility: CLINIC | Age: 73
End: 2023-08-15
Payer: MEDICARE

## 2023-08-15 NOTE — NURSING
Spoke with patient regarding referral from Dr. Mathias. Patient has hx of GIST, Dr. Mathias would like her to re-establish with oncology.  Patient agreed to 8/22 w/Dr. Morales.  Patient verbalized understanding of date, time and location.

## 2023-08-21 ENCOUNTER — OFFICE VISIT (OUTPATIENT)
Dept: CARDIOLOGY | Facility: CLINIC | Age: 73
End: 2023-08-21
Payer: MEDICARE

## 2023-08-21 VITALS
BODY MASS INDEX: 43.71 KG/M2 | HEIGHT: 63 IN | HEART RATE: 68 BPM | DIASTOLIC BLOOD PRESSURE: 79 MMHG | SYSTOLIC BLOOD PRESSURE: 138 MMHG | WEIGHT: 246.69 LBS

## 2023-08-21 DIAGNOSIS — I10 ESSENTIAL HYPERTENSION: ICD-10-CM

## 2023-08-21 DIAGNOSIS — I25.10 ATHEROSCLEROSIS OF NATIVE CORONARY ARTERY OF NATIVE HEART WITHOUT ANGINA PECTORIS: Primary | ICD-10-CM

## 2023-08-21 DIAGNOSIS — E78.5 DYSLIPIDEMIA: ICD-10-CM

## 2023-08-21 PROCEDURE — 99214 OFFICE O/P EST MOD 30 MIN: CPT | Mod: S$PBB,,, | Performed by: INTERNAL MEDICINE

## 2023-08-21 PROCEDURE — 99999 PR PBB SHADOW E&M-EST. PATIENT-LVL III: ICD-10-PCS | Mod: PBBFAC,,, | Performed by: INTERNAL MEDICINE

## 2023-08-21 PROCEDURE — 99999 PR PBB SHADOW E&M-EST. PATIENT-LVL III: CPT | Mod: PBBFAC,,, | Performed by: INTERNAL MEDICINE

## 2023-08-21 PROCEDURE — 99214 PR OFFICE/OUTPT VISIT, EST, LEVL IV, 30-39 MIN: ICD-10-PCS | Mod: S$PBB,,, | Performed by: INTERNAL MEDICINE

## 2023-08-21 PROCEDURE — 99213 OFFICE O/P EST LOW 20 MIN: CPT | Mod: PBBFAC,PO | Performed by: INTERNAL MEDICINE

## 2023-08-21 NOTE — PROGRESS NOTES
Subjective:    Patient ID:  Nancy Figueroa is a 73 y.o. female who presents for follow-up of coronary artery disease    HPI  She comes with no complaints, no chest pain, no shortness of breath  BP normal at home      Review of Systems   Constitutional: Negative for decreased appetite, malaise/fatigue, weight gain and weight loss.   Cardiovascular:  Negative for chest pain, dyspnea on exertion, leg swelling, palpitations and syncope.   Respiratory:  Negative for cough and shortness of breath.    Gastrointestinal: Negative.    Neurological:  Negative for weakness.   All other systems reviewed and are negative.     Objective:      Physical Exam  Vitals and nursing note reviewed.   Constitutional:       Appearance: Normal appearance. She is well-developed.   HENT:      Head: Normocephalic.   Eyes:      Pupils: Pupils are equal, round, and reactive to light.   Neck:      Thyroid: No thyromegaly.      Vascular: No carotid bruit or JVD.   Cardiovascular:      Rate and Rhythm: Normal rate and regular rhythm.      Chest Wall: PMI is not displaced.      Pulses: Normal pulses and intact distal pulses.      Heart sounds: Normal heart sounds. No murmur heard.     No gallop.   Pulmonary:      Effort: Pulmonary effort is normal.      Breath sounds: Normal breath sounds.   Abdominal:      Palpations: Abdomen is soft. There is no mass.      Tenderness: There is no abdominal tenderness.   Musculoskeletal:         General: Normal range of motion.      Cervical back: Normal range of motion and neck supple.   Skin:     General: Skin is warm.   Neurological:      Mental Status: She is alert and oriented to person, place, and time.      Sensory: No sensory deficit.      Deep Tendon Reflexes: Reflexes are normal and symmetric.         Most Recent EKG Results  Results for orders placed or performed in visit on 11/14/19   EKG 12-lead    Collection Time: 11/14/19  9:40 AM    Narrative    Test Reason : E66.01    Vent. Rate : 065 BPM      Atrial Rate : 065 BPM     P-R Int : 150 ms          QRS Dur : 074 ms      QT Int : 410 ms       P-R-T Axes : 035 024 018 degrees     QTc Int : 426 ms    Normal sinus rhythm  Normal ECG  When compared with ECG of 13-NOV-2018 14:57,  No significant change was found  Confirmed by YESSENIA LANTIGUA MD ([id number]) on 11/15/2019 12:21:34 PM    Referred By: OPAL ASHBY           Confirmed By:YESSENIA LANTIGUA MD       Most Recent Echocardiogram Results  Results for orders placed in visit on 08/10/23    Stress Echo    Interpretation Summary    Left Ventricle: The left ventricle is normal in size. There is concentric remodeling. Normal wall motion. There is normal systolic function with a visually estimated ejection fraction of 55 - 60%. Grade II diastolic dysfunction.    Right Ventricle: Normal right ventricular cavity size. Systolic function is normal.    Stress Protocol: The patient was stressed using an exercise protocol. The patient exercised for 3 minutes 58 seconds on a high ramp protocol, corresponding to a functional capacity of 7 METS, achieving a peak heart rate of 116 bpm, which is 81 % of the age predicted maximum heart rate. Their exercise capacity was moderately impaired. The test was stopped because the patient experienced fatigue.    Post-stress      Most Recent Nuclear Stress Test Results  Results for orders placed during the hospital encounter of 11/13/18    Stress test with myocardial perfusion    Interpretation Summary  · The patient reported SOB (non-anginal) during the stress test.  · The test was stopped secondary to fatigue and shortness of breath.  · There were no arrhythmias during stress.  · The EKG portion of this study is negative for myocardial ischemia.  · Rest Ejection Fraction is 65 %.  · LV cavity size at rest is normal.  · There is a small amount of ischemia in the anterior wall(s).  · There is a mild intensity defect in the anterior wall of the left ventricle, secondary to breast  attenuation.      Most Recent Cardiac PET Stress Test Results  No results found for this or any previous visit.      Most Recent Cardiovascular Angiogram results  No results found for this or any previous visit.      Other Most Recent Cardiology Results  Results for orders placed during the hospital encounter of 01/25/22    CARDIAC MONITORING STRIPS      Assessment:       1. Atherosclerosis of native coronary artery of native heart without angina pectoris    2. Dyslipidemia    3. Essential hypertension         Plan:     Continue:  Antiplatelet, ASA, Beta blocker, and Calcium blocker  Regular exercise program  Weight loss  1 yr f/u with DONNY winters

## 2023-08-22 ENCOUNTER — OFFICE VISIT (OUTPATIENT)
Dept: HEMATOLOGY/ONCOLOGY | Facility: CLINIC | Age: 73
End: 2023-08-22
Payer: MEDICARE

## 2023-08-22 VITALS
TEMPERATURE: 97 F | HEART RATE: 75 BPM | OXYGEN SATURATION: 96 % | BODY MASS INDEX: 41.89 KG/M2 | SYSTOLIC BLOOD PRESSURE: 130 MMHG | HEIGHT: 64 IN | DIASTOLIC BLOOD PRESSURE: 88 MMHG | WEIGHT: 245.38 LBS

## 2023-08-22 DIAGNOSIS — Z85.09 HISTORY OF GASTROINTESTINAL STROMAL TUMOR (GIST): Primary | ICD-10-CM

## 2023-08-22 DIAGNOSIS — I25.10 CORONARY ARTERY DISEASE, UNSPECIFIED VESSEL OR LESION TYPE, UNSPECIFIED WHETHER ANGINA PRESENT, UNSPECIFIED WHETHER NATIVE OR TRANSPLANTED HEART: ICD-10-CM

## 2023-08-22 DIAGNOSIS — Z85.09 HISTORY OF GASTROINTESTINAL STROMAL TUMOR (GIST): ICD-10-CM

## 2023-08-22 DIAGNOSIS — E03.9 HYPOTHYROIDISM, UNSPECIFIED TYPE: ICD-10-CM

## 2023-08-22 DIAGNOSIS — I10 HYPERTENSION, UNSPECIFIED TYPE: ICD-10-CM

## 2023-08-22 DIAGNOSIS — R91.8 PULMONARY NODULES: Primary | ICD-10-CM

## 2023-08-22 DIAGNOSIS — E78.5 HYPERLIPIDEMIA, UNSPECIFIED HYPERLIPIDEMIA TYPE: ICD-10-CM

## 2023-08-22 PROCEDURE — 99214 OFFICE O/P EST MOD 30 MIN: CPT | Mod: S$PBB,,, | Performed by: INTERNAL MEDICINE

## 2023-08-22 PROCEDURE — 99214 OFFICE O/P EST MOD 30 MIN: CPT | Mod: PBBFAC,PN | Performed by: INTERNAL MEDICINE

## 2023-08-22 PROCEDURE — 99999 PR PBB SHADOW E&M-EST. PATIENT-LVL IV: CPT | Mod: PBBFAC,,, | Performed by: INTERNAL MEDICINE

## 2023-08-22 PROCEDURE — 99999 PR PBB SHADOW E&M-EST. PATIENT-LVL IV: ICD-10-PCS | Mod: PBBFAC,,, | Performed by: INTERNAL MEDICINE

## 2023-08-22 PROCEDURE — 99214 PR OFFICE/OUTPT VISIT, EST, LEVL IV, 30-39 MIN: ICD-10-PCS | Mod: S$PBB,,, | Performed by: INTERNAL MEDICINE

## 2023-08-22 NOTE — PROGRESS NOTES
PATIENT: Nancy Figueroa  MRN: [id number]  DATE: 8/22/2023      Diagnosis:   1. Pulmonary nodules    2. History of gastrointestinal stromal tumor (GIST)    3. Hypertension, unspecified type    4. Hyperlipidemia, unspecified hyperlipidemia type    5. Hypothyroidism, unspecified type    6. Coronary artery disease, unspecified vessel or lesion type, unspecified whether angina present, unspecified whether native or transplanted heart        Chief Complaint: Establish Care (New pt; Hx of gastrointestinal stromal tumor (GIST) )      Oncologic History:      Oncologic History     Oncologic Treatment     Pathology           Subjective:    Initial History: Ms. Figueroa is a 73 y.o. female with CAD, HTN, GERD, Hypothyroidism, who presents for GIST.  The patient was initially undergoing a gastric sleeve with a portion of the stomach resected on 1/27/20.  A GIST tumor was found in the stomach measuring 0.4x0.4x0.1cm, mitotic rate of 0/5mm2, low grade, 0.8cm from closest margin. [id number]. Stage 1A.  Pt was initially seen by oncologist Dr Waite 2/24/20 whom ordered a Ct C/A/P on 3/13/20 showing two sub 5mm nodules in the left and right lung.  The patient then underwent surveillance scans.  PET/CT 8/18/21 showed a tiny pulmonary nodul.  Most recent CT chest 7/25/23 showed a stable 4 mm ground-glass nodule in the anterior inferior right upper lobe; stable 2 mm oval pleural-based nodule smooth margins in the left lower lobe; stable 2 mm juxtapleural nodule in the right upper lobe.    Currently the patient complains of occasional headache and sore throat.  She denies fever, cough, SOB.  She took a home COVID-19 test which was negative.  The patient denies CP, abdominal pain, nausea, vomiting, constipation, diarrhea.  The patient denies chills, night sweats, weight loss, new lumps or bumps, easy bruising or bleeding.      Past Medical History:   Past Medical History:   Diagnosis Date    Atherosclerotic heart disease of native coronary  artery without angina pectoris 5/1/2017 4/17  Left main: normal  LAD: two diagonals  Less than 50%.  90% mid LAD Circumflex:  minimal disease, less than 20%. --  RCA: The vessel was large sized (dominant) with less than 20% stenosis.    Cancer     GIST stomach    Cataract     OU    Essential hypertension 10/18/2012    GERD (gastroesophageal reflux disease)     HTN (hypertension)     Hypothyroidism     NSTEMI (non-ST elevated myocardial infarction) 4/24/2017 4/2017    Stented coronary artery 7/26/2017 4/17 LAD-Synergy 3.0 x 28        Past Surgical HIstory:   Past Surgical History:   Procedure Laterality Date    APPENDECTOMY      BREAST CYST ASPIRATION      CARDIAC CATHETERIZATION  2017    LAD stent     CATARACT EXTRACTION Bilateral     per pt     COLONOSCOPY      COLONOSCOPY N/A 01/25/2022    Procedure: COLONOSCOPY;  Surgeon: Tanner Ron MD;  Location: [facility];  Service: Endoscopy;  Laterality: N/A;    ESOPHAGOGASTRODUODENOSCOPY N/A 11/18/2021    Procedure: EGD (ESOPHAGOGASTRODUODENOSCOPY);  Surgeon: Tanner Ron MD;  Location: [facility];  Service: Endoscopy;  Laterality: N/A;    HYSTERECTOMY      YANG with BSO    LAPAROSCOPIC SLEEVE GASTRECTOMY N/A 01/27/2020    Procedure: GASTRECTOMY, SLEEVE, LAPAROSCOPIC;  Surgeon: Cirilo Mathias MD;  Location: [facility];  Service: General;  Laterality: N/A;       Family History:   Family History   Problem Relation Age of Onset    Cancer Mother 80        breast cancer    Dementia Mother     Cataracts Mother     Breast cancer Mother 70    Lung cancer Father     Cancer Father         lung cancer    Cataracts Father     Obesity Sister     Obesity Sister     Obesity Sister     Breast cancer Paternal Aunt     Cancer Paternal Grandmother         colon       Social History:  reports that she has never smoked. She has never used smokeless tobacco. She reports that she does not drink alcohol and does not use drugs.    Allergies:  Review of patient's allergies  indicates:  No Known Allergies    Medications:  Current Outpatient Medications   Medication Sig Dispense Refill    amLODIPine (NORVASC) 5 MG tablet Take 1 tablet (5 mg total) by mouth once daily. 90 tablet 1    aspirin (ECOTRIN) 81 MG EC tablet Take 81 mg by mouth every evening. HOLD FOR 1 WEEK PRIOR TO SURGERY      atorvastatin (LIPITOR) 10 MG tablet TAKE 1 TABLET(10 MG) BY MOUTH EVERY DAY 90 tablet 4    carvediloL (COREG) 6.25 MG tablet TAKE 1 TABLET(6.25 MG) BY MOUTH TWICE DAILY 180 tablet 4    clopidogreL (PLAVIX) 75 mg tablet TAKE 1 TABLET(75 MG) BY MOUTH EVERY DAY 90 tablet 4    cyanocobalamin 500 MCG tablet Take 500 mcg by mouth once daily.      levothyroxine (SYNTHROID) 112 MCG tablet TAKE 1 TABLET(112 MCG) BY MOUTH BEFORE BREAKFAST 90 tablet 3    multivitamin (THERAGRAN) per tablet Take 1 tablet by mouth once daily.      terbinafine HCL (LAMISIL) 250 mg tablet Take 1 tablet (250 mg total) by mouth once daily. 90 tablet 0    topiramate (TOPAMAX) 25 MG tablet Take 1 tablet (25 mg total) by mouth 2 (two) times daily. 60 tablet 0     No current facility-administered medications for this visit.       Review of Systems   Constitutional:  Negative for appetite change, chills, fatigue, fever and unexpected weight change.   HENT:  Positive for sore throat. Negative for mouth sores and trouble swallowing.    Eyes:  Negative for photophobia, pain and visual disturbance.   Respiratory:  Negative for cough, chest tightness and shortness of breath.    Cardiovascular:  Negative for chest pain, palpitations and leg swelling.   Gastrointestinal:  Negative for abdominal pain, constipation, diarrhea, nausea and vomiting.   Genitourinary:  Negative for difficulty urinating, dysuria and frequency.   Musculoskeletal:  Negative for arthralgias and back pain.   Skin:  Negative for color change and rash.   Neurological:  Positive for headaches (attributes to sinuses). Negative for dizziness, weakness, light-headedness and numbness.  "  Hematological:  Negative for adenopathy. Does not bruise/bleed easily.   Psychiatric/Behavioral:  The patient is not nervous/anxious.        ECOG Performance Status: 0   Objective:      Vitals:   Vitals:    08/22/23 1010   BP: 130/88   BP Location: Left arm   Patient Position: Sitting   BP Method: Large (Manual)   Pulse: 75   Temp: 97.3 °F (36.3 °C)   TempSrc: Temporal   SpO2: 96%   Weight: 111.3 kg (245 lb 6 oz)   Height: 5' 4" (1.626 m)       Physical Exam  Constitutional:       General: She is not in acute distress.     Appearance: She is well-developed. She is not diaphoretic.   HENT:      Head: Normocephalic and atraumatic.   Eyes:      General: No scleral icterus.        Right eye: No discharge.         Left eye: No discharge.   Cardiovascular:      Rate and Rhythm: Normal rate and regular rhythm.      Heart sounds: Normal heart sounds. No murmur heard.     No friction rub. No gallop.   Pulmonary:      Effort: Pulmonary effort is normal. No respiratory distress.      Breath sounds: Normal breath sounds. No wheezing or rales.   Chest:      Chest wall: No tenderness.   Abdominal:      General: Bowel sounds are normal. There is no distension.      Palpations: Abdomen is soft. There is no mass.      Tenderness: There is no abdominal tenderness. There is no guarding or rebound.   Musculoskeletal:         General: No tenderness. Normal range of motion.   Lymphadenopathy:      Cervical: No cervical adenopathy.      Upper Body:      Right upper body: No supraclavicular or axillary adenopathy.      Left upper body: No supraclavicular or axillary adenopathy.   Skin:     Findings: No erythema or rash.   Neurological:      Mental Status: She is alert and oriented to person, place, and time.   Psychiatric:         Behavior: Behavior normal.         Laboratory Data:  No visits with results within 1 Week(s) from this visit.   Latest known visit with results is:   Clinical Support on 08/10/2023   Component Date Value Ref " Range Status    BSA 08/10/2023 2.24  m2 Final    LVOT stroke volume 08/10/2023 107.64  cm3 Final    LVIDd 08/10/2023 4.33  3.5 - 6.0 cm Final    LV Systolic Volume 08/10/2023 21.88  mL Final    LV Systolic Volume Index 08/10/2023 10.3  mL/m2 Final    LVIDs 08/10/2023 2.48  2.1 - 4.0 cm Final    LV Diastolic Volume 08/10/2023 84.54  mL Final    LV Diastolic Volume Index 08/10/2023 39.69  mL/m2 Final    IVS 08/10/2023 0.94  0.6 - 1.1 cm Final    LVOT diameter 08/10/2023 2.07  cm Final    LVOT area 08/10/2023 3.4  cm2 Final    FS 08/10/2023 43  28 - 44 % Final    Left Ventricle Relative Wall Thick* 08/10/2023 0.47  cm Final    Posterior Wall 08/10/2023 1.02  0.6 - 1.1 cm Final    LV mass 08/10/2023 140.11  g Final    LV Mass Index 08/10/2023 66  g/m2 Final    MV Peak E Raymond 08/10/2023 0.91  m/s Final    TDI LATERAL 08/10/2023 0.10  m/s Final    TDI SEPTAL 08/10/2023 0.08  m/s Final    E/E' ratio 08/10/2023 10.11  m/s Final    MV Peak A Raymond 08/10/2023 0.87  m/s Final    TR Max Raymond 08/10/2023 2.61  m/s Final    E/A ratio 08/10/2023 1.05   Final    E wave deceleration time 08/10/2023 177.14  msec Final    LV SEPTAL E/E' RATIO 08/10/2023 11.38  m/s Final    LV LATERAL E/E' RATIO 08/10/2023 9.10  m/s Final    PV Peak S Raymond 08/10/2023 0.54  m/s Final    PV Peak D Raymond 08/10/2023 0.47  m/s Final    Pulm vein S/D ratio 08/10/2023 1.15   Final    LVOT peak raymond 08/10/2023 1.39  m/s Final    Left Ventricular Outflow Tract Padma* 08/10/2023 0.92  cm/s Final    Left Ventricular Outflow Tract Padma* 08/10/2023 3.95  mmHg Final    LA size 08/10/2023 3.27  cm Final    Left Atrium Major Axis 08/10/2023 4.98  cm Final    Left Atrium Minor Axis 08/10/2023 4.57  cm Final    Right ventricular length in diasto* 08/10/2023 6.07  cm Final    RV mid diameter 08/10/2023 1.79  cm Final    RV S' 08/10/2023 7.89  cm/s Final    TAPSE 08/10/2023 1.87  cm Final    RA Major Axis 08/10/2023 3.63  cm Final    AV mean gradient 08/10/2023 6  mmHg Final    AV  peak gradient 08/10/2023 11  mmHg Final    Ao peak ryan 08/10/2023 1.67  m/s Final    Ao VTI 08/10/2023 36.70  cm Final    LVOT peak VTI 08/10/2023 32.00  cm Final    AV valve area 08/10/2023 2.93  cm² Final    AV Velocity Ratio 08/10/2023 0.83   Final    AV index (prosthetic) 08/10/2023 0.87   Final    CHIDI by Velocity Ratio 08/10/2023 2.80  cm² Final    MV stenosis pressure 1/2 time 08/10/2023 51.37  ms Final    MV valve area p 1/2 method 08/10/2023 4.28  cm2 Final    Triscuspid Valve Regurgitation Pea* 08/10/2023 27  mmHg Final    Sinus 08/10/2023 2.60  cm Final    STJ 08/10/2023 2.51  cm Final    Ascending aorta 08/10/2023 2.84  cm Final    85% Max Predicted HR 08/10/2023 122   Final    Max Predicted HR 08/10/2023 144   Final    OHS CV CPX PATIENT IS MALE 08/10/2023 0.0   Final    OHS CV CPX PATIENT IS FEMALE 08/10/2023 1.0   Final    Mean e' 08/10/2023 0.09  m/s Final    ZLVIDS 08/10/2023 -4.05   Final    ZLVIDD 08/10/2023 -4.50   Final    RVDD 08/10/2023 3.42  cm Final    LA Volume Index 08/10/2023 24.9  mL/m2 Final    LA volume 08/10/2023 52.99  cm3 Final    LA WIDTH 08/10/2023 4.0  cm Final    Systolic blood pressure 08/10/2023 115  mmHg Final    Diastolic blood pressure 08/10/2023 69  mmHg Final    HR at rest 08/10/2023 76  bpm Final    Exercise duration (min) 08/10/2023 3  minutes Final    Exercise duration (sec) 08/10/2023 58  seconds Final    Peak Systolic BP 08/10/2023 212  mmHg Final    Peak Diatolic BP 08/10/2023 79  mmHg Final    Peak HR 08/10/2023 116  bpm Final    Estimated METs 08/10/2023 7   Final    % Max HR Achieved 08/10/2023 81   Final    1 Minute Recovery HR 08/10/2023 83  bpm Final    RPP 08/10/2023 8,740   Final    Peak RPP 08/10/2023 24,592   Final    TV resting pulmonary artery pressu* 08/10/2023 30  mmHg Final    RV TB RVSP 08/10/2023 6  mmHg Final    Est. RA pres 08/10/2023 3  mmHg Final         Imaging: PET/CT 8/18/21  Head and neck:     No hypermetabolic activity is noted to suggest  metastatic disease.  Some mucous membrane thickening appears to be present within the sphenoid sinus.     Thorax:     No hypermetabolic activity is noted to suggest metastatic disease.  Moderate to severe coronary calcifications are suspected increasing the patient's coronary risk.     A small pulmonary nodule is noted image 76 sequence 3 of 4 mm.  In a high-risk patient follow-up for size stability would be suggested.  This appears new since 06/25/2020 and further follow-up is necessary in 3-6 months     Abdomen and pelvis:     Evidence of gastric diaphragmatic hernia is noted along with evidence of gastrostomy.  A moderate hernia appears to be present with approximately half of the stomach above the level of the diaphragm.     A tiny hypodensity is noted within the liver near the level of the kidney 11 mm with a Hounsfield unit suggestive of fluid or fat of 5 noted.  This appears stable since 07/23/2021 and does not appear to be associated with focal hypermetabolic activity.  This would statistically be favored relate to a cyst.  If confirmation is desired ultrasound could be attempted.  This is also stable since 06/25/2020     A moderate quantity of hypermetabolic activity is noted within the colon which could obscure visualization of underlying lesions the but would statistically be favored to be physiologic     Musculoskeletal:     Diffuse heterogeneous hypermetabolic activity is noted within the osseous structures without focal lesion identified to suggest metastasis.  The underlying background activity hinders ideal evaluation    CT Chest 7/25/23  There is no mediastinal nor axillary adenopathy.  The aorta is normal in caliber.  Calcific plaque and/or a stent present in the LAD.  No pleural effusion is present.     There is a 4 mm ground-glass nodule in the anterior inferior right upper lobe series 4, image 172 unchanged compared to the previous PET-CT, and stable compared to the chest CT of 07/23/2021.  It  is also unchanged compared to an older CT of 03/13/2020.  This is consistent with a benign finding.  There is a 2 mm oval pleural based nodule with smooth margins in the left lower lobe series 4, image 326 consistent with a benign juxtapleural nodule.  Additional 2 mm juxtapleural nodule right upper lobe series 4, image 164 is unchanged on studies dating back to at least 01/28/2021.  Areas of mild pleural nodularity in the posterior lower lobes bilaterally which were described as nodules on the most recent chest CT are no longer present.  There are no new lung nodules.     There are postsurgical changes in the stomach with a moderate sized hiatal hernia.     No significant findings noted in the visualized portions of the upper abdomen.       Assessment:       1. Pulmonary nodules    2. History of gastrointestinal stromal tumor (GIST)    3. Hypertension, unspecified type    4. Hyperlipidemia, unspecified hyperlipidemia type    5. Hypothyroidism, unspecified type    6. Coronary artery disease, unspecified vessel or lesion type, unspecified whether angina present, unspecified whether native or transplanted heart           Plan:     GIST - Pt with a stage IA GIST found incidentally during partial gastrectomy for gastric sleeve formation 1/27/20  -Tumor measured 0.4x0.4x0.1cm, mitotic rate of 0/5mm2, low grade, 0.8cm from closest margin. [id number]  -PT with metastasis risk of 0%  -Will obtain abdominal MRI    Pulmonary Nodules - Pt with stable pulmonary nodules on CT chest 7/25/23  -Pt previously seen by Dr Hollingsworth  -No concern for metastases    HTN - amlodipine, coreg  -BP stable  -Will monitor    HLD - pt on lipitor  -MAnagement per cardiology    Hypothyroidism - pt on levothyroxine  Lab Results   Component Value Date    TSH 3.858 07/25/2023   -Management per PCP    CAD - lipitor, antihypertensives and plavix  -Pt asymptomatic  -Management per cardiology    Route Chart for Scheduling    Med Onc Chart Routing      Follow  up with physician Other. The patient needs an MRI of the abdomen in the next month with a return appt.  Pt needs an appt with me in 6 months with CBC adn CMP prior.   Follow up with ABRIL    Infusion scheduling note    Injection scheduling note    Labs    Imaging    Pharmacy appointment    Other referrals                 Devonte Morales MD  [facility]  Hematology and Oncology  [facility]   [address] [place] [address]   O: [phone]  F: [phone]

## 2023-09-10 ENCOUNTER — PATIENT MESSAGE (OUTPATIENT)
Dept: FAMILY MEDICINE | Facility: CLINIC | Age: 73
End: 2023-09-10
Payer: MEDICARE

## 2023-09-10 DIAGNOSIS — Z79.899 HIGH RISK MEDICATION USE: Primary | ICD-10-CM

## 2023-09-10 NOTE — TELEPHONE ENCOUNTER
Please see mycart message and advise.    I see she has some labs ready to schedule but I am not sure if there are any you would like to add.

## 2023-09-13 ENCOUNTER — LAB VISIT (OUTPATIENT)
Dept: LAB | Facility: HOSPITAL | Age: 73
End: 2023-09-13
Attending: FAMILY MEDICINE
Payer: MEDICARE

## 2023-09-13 ENCOUNTER — IMMUNIZATION (OUTPATIENT)
Dept: FAMILY MEDICINE | Facility: CLINIC | Age: 73
End: 2023-09-13
Payer: MEDICARE

## 2023-09-13 DIAGNOSIS — Z79.899 HIGH RISK MEDICATION USE: ICD-10-CM

## 2023-09-13 LAB
ALBUMIN SERPL BCP-MCNC: 3.8 G/DL (ref 3.5–5.2)
ALP SERPL-CCNC: 90 U/L (ref 55–135)
ALT SERPL W/O P-5'-P-CCNC: 15 U/L (ref 10–44)
AST SERPL-CCNC: 18 U/L (ref 10–40)
BILIRUB DIRECT SERPL-MCNC: 0.3 MG/DL (ref 0.1–0.3)
BILIRUB SERPL-MCNC: 0.8 MG/DL (ref 0.1–1)
PROT SERPL-MCNC: 7.2 G/DL (ref 6–8.4)

## 2023-09-13 PROCEDURE — 80076 HEPATIC FUNCTION PANEL: CPT | Performed by: FAMILY MEDICINE

## 2023-09-13 PROCEDURE — 99999PBSHW FLU VACCINE - QUADRIVALENT - ADJUVANTED: Mod: PBBFAC,,,

## 2023-09-13 PROCEDURE — G0008 ADMIN INFLUENZA VIRUS VAC: HCPCS | Mod: PBBFAC,PO

## 2023-09-13 PROCEDURE — 36415 COLL VENOUS BLD VENIPUNCTURE: CPT | Mod: PO | Performed by: FAMILY MEDICINE

## 2023-09-13 PROCEDURE — 99999PBSHW FLU VACCINE - QUADRIVALENT - ADJUVANTED: ICD-10-PCS | Mod: PBBFAC,,,

## 2023-09-14 ENCOUNTER — TELEPHONE (OUTPATIENT)
Dept: FAMILY MEDICINE | Facility: CLINIC | Age: 73
End: 2023-09-14
Payer: MEDICARE

## 2023-09-14 ENCOUNTER — PATIENT MESSAGE (OUTPATIENT)
Dept: ADMINISTRATIVE | Facility: OTHER | Age: 73
End: 2023-09-14
Payer: MEDICARE

## 2023-09-14 NOTE — TELEPHONE ENCOUNTER
----- Message from Suzan Hannah MA sent at 9/12/2023  4:24 PM CDT -----  Regarding: FW: Needs return call    ----- Message -----  From: Keisha Neely  Sent: 9/12/2023   1:22 PM CDT  To: [facility] Staff  Subject: Needs return call                                Type: Needs Medical Advice  Who Called:  Nancy Lundberg Call Back Number: [phone]    Additional Information: Pt states she gets a certain kind of flu shot scheduled with the office she was needing to schedule hers for this year with the office please [email or web address].

## 2023-09-22 ENCOUNTER — OFFICE VISIT (OUTPATIENT)
Dept: BARIATRICS | Facility: CLINIC | Age: 73
End: 2023-09-22
Payer: MEDICARE

## 2023-09-22 VITALS
HEART RATE: 73 BPM | TEMPERATURE: 97 F | RESPIRATION RATE: 16 BRPM | DIASTOLIC BLOOD PRESSURE: 91 MMHG | BODY MASS INDEX: 40.57 KG/M2 | SYSTOLIC BLOOD PRESSURE: 134 MMHG | WEIGHT: 237.63 LBS | HEIGHT: 64 IN

## 2023-09-22 DIAGNOSIS — I10 ESSENTIAL HYPERTENSION: ICD-10-CM

## 2023-09-22 DIAGNOSIS — Z85.09 HISTORY OF GASTROINTESTINAL STROMAL TUMOR (GIST): Primary | ICD-10-CM

## 2023-09-22 DIAGNOSIS — D21.4 GIST (GASTROINTESTINAL STROMAL TUMOR), NON-MALIGNANT: ICD-10-CM

## 2023-09-22 DIAGNOSIS — K44.9 HIATAL HERNIA: ICD-10-CM

## 2023-09-22 DIAGNOSIS — E66.01 MORBID OBESITY: Primary | ICD-10-CM

## 2023-09-22 PROCEDURE — 99999 PR PBB SHADOW E&M-EST. PATIENT-LVL III: CPT | Mod: PBBFAC,,, | Performed by: SURGERY

## 2023-09-22 PROCEDURE — 99999 PR PBB SHADOW E&M-EST. PATIENT-LVL III: ICD-10-PCS | Mod: PBBFAC,,, | Performed by: SURGERY

## 2023-09-22 PROCEDURE — 99213 OFFICE O/P EST LOW 20 MIN: CPT | Mod: PBBFAC,PO | Performed by: SURGERY

## 2023-09-22 PROCEDURE — 99213 OFFICE O/P EST LOW 20 MIN: CPT | Mod: S$PBB,,, | Performed by: SURGERY

## 2023-09-22 PROCEDURE — 99213 PR OFFICE/OUTPT VISIT, EST, LEVL III, 20-29 MIN: ICD-10-PCS | Mod: S$PBB,,, | Performed by: SURGERY

## 2023-09-22 RX ORDER — TOPIRAMATE 25 MG/1
25 TABLET ORAL 2 TIMES DAILY
Qty: 180 TABLET | Refills: 0 | Status: SHIPPED | OUTPATIENT
Start: 2023-09-22 | End: 2023-11-10 | Stop reason: SDUPTHER

## 2023-09-25 ENCOUNTER — HOSPITAL ENCOUNTER (OUTPATIENT)
Dept: RADIOLOGY | Facility: HOSPITAL | Age: 73
Discharge: HOME | End: 2023-09-25
Attending: INTERNAL MEDICINE
Payer: MEDICARE

## 2023-09-25 DIAGNOSIS — Z85.09 HISTORY OF GASTROINTESTINAL STROMAL TUMOR (GIST): ICD-10-CM

## 2023-09-25 PROCEDURE — 74183 MRI ABD W/O CNTR FLWD CNTR: CPT | Mod: TC,PO

## 2023-09-25 PROCEDURE — A9585 GADOBUTROL INJECTION: HCPCS | Mod: PO | Performed by: INTERNAL MEDICINE

## 2023-09-25 PROCEDURE — 25500020 PHARM REV CODE 255: Mod: PO | Performed by: INTERNAL MEDICINE

## 2023-09-25 PROCEDURE — 74183 MRI ABD W/O CNTR FLWD CNTR: CPT | Mod: 26,,, | Performed by: RADIOLOGY

## 2023-09-25 PROCEDURE — 74183 MRI ABDOMEN W WO CONTRAST: ICD-10-PCS | Mod: 26,,, | Performed by: RADIOLOGY

## 2023-09-25 RX ORDER — GADOBUTROL 604.72 MG/ML
10 INJECTION INTRAVENOUS
Status: COMPLETED | OUTPATIENT
Start: 2023-09-25 | End: 2023-09-25

## 2023-09-25 RX ADMIN — GADOBUTROL 10 ML: 604.72 INJECTION INTRAVENOUS at 09:09

## 2023-10-16 ENCOUNTER — TELEPHONE (OUTPATIENT)
Dept: HEMATOLOGY/ONCOLOGY | Facility: CLINIC | Age: 73
End: 2023-10-16
Payer: MEDICARE

## 2023-10-16 NOTE — TELEPHONE ENCOUNTER
Spoke to patient and advised of below and confirmed February follow up.    ----- Message from Devonte Morales MD sent at 10/15/2023  6:36 PM CDT -----  Please call the patient and let her know that her MRI abdomen on 10/15/23 showed no concerning findings for GIST tumor.  We will see her back with labs in February.

## 2023-10-19 ENCOUNTER — PATIENT MESSAGE (OUTPATIENT)
Dept: CARDIOLOGY | Facility: CLINIC | Age: 73
End: 2023-10-19
Payer: MEDICARE

## 2023-10-19 DIAGNOSIS — I10 ESSENTIAL HYPERTENSION: ICD-10-CM

## 2023-10-19 DIAGNOSIS — I25.10 ATHEROSCLEROSIS OF NATIVE CORONARY ARTERY OF NATIVE HEART WITHOUT ANGINA PECTORIS: ICD-10-CM

## 2023-10-19 DIAGNOSIS — Z95.5 STENTED CORONARY ARTERY: Chronic | ICD-10-CM

## 2023-10-19 DIAGNOSIS — E78.5 DYSLIPIDEMIA: ICD-10-CM

## 2023-10-19 DIAGNOSIS — I25.2 HISTORY OF NON-ST ELEVATION MYOCARDIAL INFARCTION (NSTEMI): ICD-10-CM

## 2023-10-19 RX ORDER — AMLODIPINE BESYLATE 5 MG/1
5 TABLET ORAL
Qty: 90 TABLET | Refills: 2 | Status: SHIPPED | OUTPATIENT
Start: 2023-10-19

## 2023-10-19 NOTE — TELEPHONE ENCOUNTER
Refill Routing Note   Medication(s) are not appropriate for processing by [facility] for the following reason(s):      Required vitals abnormal    [facility] action(s):  Defer Care Due:  None identified            Appointments  past 12m or future 3m with PCP    Date Provider   Last Visit   7/17/2023 KAREEM Pantoja MD   Next Visit   Visit date not found KAREEM Pantoja MD   ED visits in past 90 days: 0        Note composed:12:34 PM 10/19/2023

## 2023-10-19 NOTE — TELEPHONE ENCOUNTER
No care due was identified.  [facility] Embedded Care Due Messages. Reference number: [id number].   10/19/2023 11:52:43 AM CDT

## 2023-10-20 RX ORDER — ATORVASTATIN CALCIUM 10 MG/1
10 TABLET, FILM COATED ORAL DAILY
Qty: 90 TABLET | Refills: 3 | Status: SHIPPED | OUTPATIENT
Start: 2023-10-20

## 2023-11-09 ENCOUNTER — HOSPITAL ENCOUNTER (OUTPATIENT)
Dept: RADIOLOGY | Facility: HOSPITAL | Age: 73
Discharge: HOME | End: 2023-11-09
Attending: FAMILY MEDICINE
Payer: MEDICARE

## 2023-11-09 DIAGNOSIS — Z12.31 ENCOUNTER FOR SCREENING MAMMOGRAM FOR BREAST CANCER: ICD-10-CM

## 2023-11-09 PROCEDURE — 77067 SCR MAMMO BI INCL CAD: CPT | Mod: 26,,, | Performed by: RADIOLOGY

## 2023-11-09 PROCEDURE — 77063 BREAST TOMOSYNTHESIS BI: CPT | Mod: 26,,, | Performed by: RADIOLOGY

## 2023-11-09 PROCEDURE — 77063 MAMMO DIGITAL SCREENING BILAT WITH TOMO: ICD-10-PCS | Mod: 26,,, | Performed by: RADIOLOGY

## 2023-11-09 PROCEDURE — 77067 SCR MAMMO BI INCL CAD: CPT | Mod: TC,PO

## 2023-11-09 PROCEDURE — 77067 MAMMO DIGITAL SCREENING BILAT WITH TOMO: ICD-10-PCS | Mod: 26,,, | Performed by: RADIOLOGY

## 2023-11-10 DIAGNOSIS — E66.01 MORBID OBESITY: Primary | ICD-10-CM

## 2023-11-13 RX ORDER — TOPIRAMATE 25 MG/1
25 TABLET ORAL 2 TIMES DAILY
Qty: 180 TABLET | Refills: 0 | Status: SHIPPED | OUTPATIENT
Start: 2023-11-13 | End: 2024-03-15 | Stop reason: SDUPTHER

## 2023-11-16 NOTE — TELEPHONE ENCOUNTER
----- Message from RT Wesley sent at 7/3/2019 10:54 AM CDT -----  Contact: pt    pt , requesting lab test orders and scheduled prior to appt of 09/2019, thanks.   -See Gyn Oncology history above  -Cycle 3 of gem/carbo/avastin was scheduled to start tomorrow. Will likely be delayed given acute SBO and hospitalization.  -Lovenox daily for VTE prophylaxis

## 2023-12-12 ENCOUNTER — PATIENT MESSAGE (OUTPATIENT)
Dept: FAMILY MEDICINE | Facility: CLINIC | Age: 73
End: 2023-12-12
Payer: MEDICARE

## 2023-12-15 ENCOUNTER — OFFICE VISIT (OUTPATIENT)
Dept: BARIATRICS | Facility: CLINIC | Age: 73
End: 2023-12-15
Payer: MEDICARE

## 2023-12-15 VITALS
BODY MASS INDEX: 38.24 KG/M2 | SYSTOLIC BLOOD PRESSURE: 156 MMHG | WEIGHT: 224 LBS | DIASTOLIC BLOOD PRESSURE: 92 MMHG | RESPIRATION RATE: 16 BRPM | HEIGHT: 64 IN

## 2023-12-15 DIAGNOSIS — K44.9 HIATAL HERNIA: ICD-10-CM

## 2023-12-15 DIAGNOSIS — Z90.3 HISTORY OF SLEEVE GASTRECTOMY: ICD-10-CM

## 2023-12-15 DIAGNOSIS — D21.4 GIST (GASTROINTESTINAL STROMAL TUMOR), NON-MALIGNANT: ICD-10-CM

## 2023-12-15 DIAGNOSIS — E66.9 OBESITY (BMI 30-39.9): Primary | ICD-10-CM

## 2023-12-15 DIAGNOSIS — K21.9 GASTROESOPHAGEAL REFLUX DISEASE, UNSPECIFIED WHETHER ESOPHAGITIS PRESENT: ICD-10-CM

## 2023-12-15 PROCEDURE — 99213 OFFICE O/P EST LOW 20 MIN: CPT | Mod: PBBFAC,PO | Performed by: NURSE PRACTITIONER

## 2023-12-15 PROCEDURE — 99214 OFFICE O/P EST MOD 30 MIN: CPT | Mod: S$PBB,,, | Performed by: NURSE PRACTITIONER

## 2023-12-15 PROCEDURE — 99999 PR PBB SHADOW E&M-EST. PATIENT-LVL III: CPT | Mod: PBBFAC,,, | Performed by: NURSE PRACTITIONER

## 2023-12-15 PROCEDURE — 99999 PR PBB SHADOW E&M-EST. PATIENT-LVL III: ICD-10-PCS | Mod: PBBFAC,,, | Performed by: NURSE PRACTITIONER

## 2023-12-15 PROCEDURE — 99214 PR OFFICE/OUTPT VISIT, EST, LEVL IV, 30-39 MIN: ICD-10-PCS | Mod: S$PBB,,, | Performed by: NURSE PRACTITIONER

## 2023-12-15 NOTE — PATIENT INSTRUCTIONS
Efrain -[id number]  Isopure unflavored protein (20gm)  Change to [email or web address] milk  Increase protein to 100 gm  Calcium 5452-5459 mg per day (do not take with thyroid medication, 2hr apart)- supplement when needed   no

## 2023-12-15 NOTE — PROGRESS NOTES
Bariatric Surgery Post Op Note    Surgery: gastric sleeve surgery with hiatal hernia repair    Pathology- GIST  Date: 1/27/20  Initial weight: 262  Last weight: 237  Current weight: 224    Wt Readings from Last 10 Encounters:   12/15/23 101.6 kg (224 lb)   10/18/23 107.5 kg (237 lb)   09/22/23 107.8 kg (237 lb 9.6 oz)   09/19/23 108.9 kg (240 lb)   08/22/23 111.3 kg (245 lb 6 oz)   08/21/23 111.9 kg (246 lb 11.1 oz)   08/11/23 109.6 kg (241 lb 9.6 oz)   08/10/23 113.4 kg (250 lb)   08/01/23 113.4 kg (250 lb)   07/17/23 113.8 kg (250 lb 14.1 oz)         Comorbidities:   Past Medical History:   Diagnosis Date    Atherosclerotic heart disease of native coronary artery without angina pectoris 5/1/2017 4/17  Left main: normal  LAD: two diagonals  Less than 50%.  90% mid LAD Circumflex:  minimal disease, less than 20%. --  RCA: The vessel was large sized (dominant) with less than 20% stenosis.    Cancer     GIST stomach    Cataract     OU    Essential hypertension 10/18/2012    GERD (gastroesophageal reflux disease)     HTN (hypertension)     Hypothyroidism     NSTEMI (non-ST elevated myocardial infarction) 4/24/2017 4/2017    Stented coronary artery 7/26/2017 4/17 LAD-Synergy 3.0 x 28      Past Surgical History:   Procedure Laterality Date    APPENDECTOMY      BREAST CYST ASPIRATION      CARDIAC CATHETERIZATION  2017    LAD stent     CATARACT EXTRACTION Bilateral     per pt     COLONOSCOPY      COLONOSCOPY N/A 01/25/2022    Procedure: COLONOSCOPY;  Surgeon: Tanner Ron MD;  Location: [facility];  Service: Endoscopy;  Laterality: N/A;    ESOPHAGOGASTRODUODENOSCOPY N/A 11/18/2021    Procedure: EGD (ESOPHAGOGASTRODUODENOSCOPY);  Surgeon: Tanner Ron MD;  Location: [facility];  Service: Endoscopy;  Laterality: N/A;    HYSTERECTOMY      YANG with BSO    LAPAROSCOPIC SLEEVE GASTRECTOMY N/A 01/27/2020    Procedure: GASTRECTOMY, SLEEVE, LAPAROSCOPIC;  Surgeon: Cirilo Mathias MD;  Location: [facility] OR;   Service: General;  Laterality: N/A;    OOPHORECTOMY       Family History   Problem Relation Age of Onset    Cancer Mother 80        breast cancer    Dementia Mother     Cataracts Mother     Breast cancer Mother 70    Lung cancer Father     Cancer Father         lung cancer    Cataracts Father     Obesity Sister     Obesity Sister     Obesity Sister     Breast cancer Paternal Aunt     Cancer Paternal Grandmother         colon     Social History     Tobacco Use    Smoking status: Never    Smokeless tobacco: Never   Substance Use Topics    Alcohol use: No    Drug use: No          Medications:  Current Outpatient Medications on File Prior to Visit   Medication Sig Dispense Refill    amLODIPine (NORVASC) 5 MG tablet TAKE 1 TABLET(5 MG) BY MOUTH EVERY DAY 90 tablet 2    aspirin (ECOTRIN) 81 MG EC tablet Take 81 mg by mouth every evening. HOLD FOR 1 WEEK PRIOR TO SURGERY      atorvastatin (LIPITOR) 10 MG tablet Take 1 tablet (10 mg total) by mouth once daily. 90 tablet 3    carvediloL (COREG) 6.25 MG tablet TAKE 1 TABLET(6.25 MG) BY MOUTH TWICE DAILY 180 tablet 4    clopidogreL (PLAVIX) 75 mg tablet TAKE 1 TABLET(75 MG) BY MOUTH EVERY DAY 90 tablet 4    cyanocobalamin 500 MCG tablet Take 500 mcg by mouth once daily.      levothyroxine (SYNTHROID) 112 MCG tablet TAKE 1 TABLET(112 MCG) BY MOUTH BEFORE BREAKFAST 90 tablet 3    multivitamin (THERAGRAN) per tablet Take 1 tablet by mouth once daily.      topiramate (TOPAMAX) 25 MG tablet Take 1 tablet (25 mg total) by mouth 2 (two) times daily. 180 tablet 0     No current facility-administered medications on file prior to visit.         Labs: individually reviewed and interpreted.  Most Recent Data:  CBC:   Lab Results   Component Value Date    WBC 5.57 07/25/2023    HGB 13.3 07/25/2023    HCT 41.7 07/25/2023     07/25/2023    MCV 92 07/25/2023    RDW 13.1 07/25/2023     BMP:   Lab Results   Component Value Date     07/25/2023    K 4.2 07/25/2023      "07/25/2023    CO2 24 07/25/2023    BUN 16 07/25/2023    CREATININE 0.9 09/25/2023     07/25/2023    CALCIUM 9.6 07/25/2023    MG 1.9 01/28/2020    PHOS 3.0 01/28/2020     LFTs:   Lab Results   Component Value Date    PROT 7.2 09/13/2023    ALBUMIN 3.8 09/13/2023    BILITOT 0.8 09/13/2023    AST 18 09/13/2023    ALKPHOS 90 09/13/2023    ALT 15 09/13/2023     Coags: No results found for: "INR", "PROTIME", "PTT"  FLP:   Lab Results   Component Value Date    CHOL 146 07/25/2023    HDL 64 07/25/2023    LDLCALC 59.6 (L) 07/25/2023    TRIG 112 07/25/2023    CHOLHDL 43.8 07/25/2023     DM:   Lab Results   Component Value Date    HGBA1C 5.4 01/21/2020    HGBA1C 5.6 04/23/2017    LDLCALC 59.6 (L) 07/25/2023    CREATININE 0.9 09/25/2023     Thyroid:   Lab Results   Component Value Date    TSH 3.858 07/25/2023    FREET4 1.26 02/10/2021     Anemia:   Lab Results   Component Value Date    IRON 76 03/19/2021    TIBC 454 (H) 03/19/2021    XJFAGXVJ03 498 03/19/2021     Cardiac:   Lab Results   Component Value Date    TROPONINI <0.012 04/23/2017     Urinalysis:   Lab Results   Component Value Date    COLORU Yellow 08/27/2015    SPECGRAV 1.015 08/27/2015    NITRITE Negative 08/27/2015    KETONESU Negative 08/27/2015             Review of Systems:  Review of Systems   Gastrointestinal:  Positive for nausea (GERD at night, lying down, with large bites). Negative for abdominal pain.   All other systems reviewed and are negative.          Constipation: none  Reflux: intermittent, with large bites, at night  Vomiting: none      Body comp:  Fat Percent:  53.2 %  Fat Mass:  119.2 lb  FFM:  104.8 lb  TBW: 75.4 lb  TBW %:  33.7 %  BMR: 1517 kcal      Wt Readings from Last 10 Encounters:   12/15/23 101.6 kg (224 lb)   10/18/23 107.5 kg (237 lb)   09/22/23 107.8 kg (237 lb 9.6 oz)   09/19/23 108.9 kg (240 lb)   08/22/23 111.3 kg (245 lb 6 oz)   08/21/23 111.9 kg (246 lb 11.1 oz)   08/11/23 109.6 kg (241 lb 9.6 oz)   08/10/23 113.4 kg (250 " lb)   08/01/23 113.4 kg (250 lb)   07/17/23 113.8 kg (250 lb 14.1 oz)         Diet:  Breakfast: whole bread, 2 eggs fat free cheese   1.5 cups Coffee with half& half  Lunch: salad, chicken  Dinner: pasta from soy/cheese  Snack: yogurt (FF/sugar free), blueberries and skim milk  Protein shake: rarely  Water: 40+ oz per day  Weight watchers points 23      Exercise:  Go to [facility]  Was outside pool for water aerobics  10 acres she walks around    MVI:   [employer] twice per day with iron        Physical Exam:  Vitals:    12/15/23 1037   BP: (!) 156/92   Resp: 16       Physical Exam  Vitals and nursing note reviewed.   Constitutional:       General: She is not in acute distress.     Appearance: Normal appearance. She is not ill-appearing or toxic-appearing.   HENT:      Head: Normocephalic and atraumatic.      Right Ear: External ear normal.      Left Ear: External ear normal.      Nose: Nose normal. No congestion or rhinorrhea.      Mouth/Throat:      Mouth: Mucous membranes are moist.      Pharynx: Oropharynx is clear.   Eyes:      General:         Right eye: No discharge.         Left eye: No discharge.      Conjunctiva/sclera: Conjunctivae normal.   Cardiovascular:      Rate and Rhythm: Normal rate and regular rhythm.      Pulses: Normal pulses.   Pulmonary:      Effort: Pulmonary effort is normal.   Chest:      Chest wall: No tenderness.   Musculoskeletal:         General: No swelling, tenderness, deformity or signs of injury. Normal range of motion.      Right lower leg: No edema.      Left lower leg: No edema.   Skin:     General: Skin is warm and dry.      Capillary Refill: Capillary refill takes less than 2 seconds.      Coloration: Skin is not jaundiced or pale.      Findings: No bruising, erythema or rash.   Neurological:      General: No focal deficit present.      Mental Status: She is alert and oriented to person, place, and time.      Motor: No weakness.      Gait: Gait normal.   Psychiatric:         Mood  and Affect: Mood normal.         Behavior: Behavior normal.         Thought Content: Thought content normal.         Judgment: Judgment normal.                 A/P: s/p Sleeve with Hernia repair    Hiatal hernia- recurred on last CT scan 7/17/2023- follow up endoscopy - appears to have intermittent GERD,   2021 EGD without Hiatal hernia noted, states F/U in 1 year for repeat, which has not been completed       Counseling/Plan for patient:  Falls reviewed with patient  Continue prescribed medications   No side effects from topamax will continue   Tanita scale results reviewed with patient  Fat decreased 10 lbs  Muscle (FFM) decreased 3 lb  Water decreased 2 lb  Diet adherence  Ensure  gm of protein at minimum/day  Do not skip meals  Limits carbs- discussed changing to low calorie breads  Increasing protein with options provided  Exercise: ok for cardio and heavy lifting   Vitamins: 2 mvi daily, calcium, and b complex  Continue multivitamins- discussion of post-operative life long MVI need, including Calcium, and a B-Complex   Will monitor with ABRIL protein and calcium intake   2528-4333 mg calcium per day  GIST- continue to f/u with oncology  Need EGD- orders placed   GERD   Hiatal Hernia- seen on CT imaging July 2023   Hx of GIST          Co morbidities:     1. Obesity (BMI 30-39.9)        2. History of sleeve gastrectomy        3. GIST (gastrointestinal stromal tumor), non-malignant  Case Request Endoscopy: ESOPHAGOGASTRODUODENOSCOPY (EGD)      4. Hiatal hernia  Case Request Endoscopy: ESOPHAGOGASTRODUODENOSCOPY (EGD)      5. Gastroesophageal reflux disease, unspecified whether esophagitis present  Case Request Endoscopy: ESOPHAGOGASTRODUODENOSCOPY (EGD)          This includes face to face time and non-face to face time preparing to see the patient (eg, review of tests), obtaining and/or reviewing separately obtained history, documenting clinical information in the electronic or other health record,  independently interpreting results and communicating results to the patient/family/caregiver, or care coordinator.

## 2023-12-18 ENCOUNTER — TELEPHONE (OUTPATIENT)
Dept: GASTROENTEROLOGY | Facility: CLINIC | Age: 73
End: 2023-12-18
Payer: MEDICARE

## 2023-12-18 NOTE — TELEPHONE ENCOUNTER
EGD Dx is verified prior to scheduling from PCP's order and pt's Hx. Prep instructions has been sent via [facility] and/or mail. Address is confirmed. Patient is informed the preop Nurse will call him/her with the arrival time a day or two prior to procedure. Patient verbalizes understanding.

## 2024-01-10 DIAGNOSIS — I10 ESSENTIAL HYPERTENSION: ICD-10-CM

## 2024-01-10 DIAGNOSIS — I25.10 ATHEROSCLEROSIS OF NATIVE CORONARY ARTERY OF NATIVE HEART WITHOUT ANGINA PECTORIS: ICD-10-CM

## 2024-01-10 DIAGNOSIS — Z95.5 STENTED CORONARY ARTERY: Chronic | ICD-10-CM

## 2024-01-10 DIAGNOSIS — E78.5 DYSLIPIDEMIA: ICD-10-CM

## 2024-01-10 DIAGNOSIS — I25.2 HISTORY OF NON-ST ELEVATION MYOCARDIAL INFARCTION (NSTEMI): ICD-10-CM

## 2024-01-11 DIAGNOSIS — Z00.00 ENCOUNTER FOR MEDICARE ANNUAL WELLNESS EXAM: ICD-10-CM

## 2024-01-12 RX ORDER — CARVEDILOL 6.25 MG/1
TABLET ORAL
Qty: 180 TABLET | Refills: 4 | Status: SHIPPED | OUTPATIENT
Start: 2024-01-12

## 2024-01-23 ENCOUNTER — TELEPHONE (OUTPATIENT)
Dept: GASTROENTEROLOGY | Facility: CLINIC | Age: 74
End: 2024-01-23
Payer: MEDICARE

## 2024-01-23 NOTE — TELEPHONE ENCOUNTER
Lino Mckeno MD Orejarena Leonardo A. Staff35 minutes ago (8:39 AM)       Yes     Florentin Vega, GENIA  You; Lino Mckeon MD37 minutes ago (8:37 AM)     LL  Good morning Dr. Taylor,    Can Ms. Cruz hold her Plavix for 5 days prior to her EGD on 2/1?    Thank you,  Florentin

## 2024-01-29 ENCOUNTER — TELEPHONE (OUTPATIENT)
Dept: GASTROENTEROLOGY | Facility: CLINIC | Age: 74
End: 2024-01-29
Payer: MEDICARE

## 2024-01-29 NOTE — TELEPHONE ENCOUNTER
----- Message from Carmela Jackson sent at 1/29/2024  9:21 AM CST -----  Type: Needs Medical Advice  Who Called:  pt  Best Call Back Number: [phone]  Additional Information: pt is calling the office to let the Dr know she was not aware she was supposed to stop her Plavix. The pt took it this morning but she states she can not take it the rest of the week if that will work. Her procedure is scheduled on Thursday. Please call back to advise Thanks!

## 2024-02-01 ENCOUNTER — HOSPITAL ENCOUNTER (OUTPATIENT)
Facility: HOSPITAL | Age: 74
Discharge: HOME | End: 2024-02-01
Attending: INTERNAL MEDICINE | Admitting: INTERNAL MEDICINE
Payer: MEDICARE

## 2024-02-01 ENCOUNTER — ANESTHESIA EVENT (OUTPATIENT)
Dept: ENDOSCOPY | Facility: HOSPITAL | Age: 74
End: 2024-02-01
Payer: MEDICARE

## 2024-02-01 ENCOUNTER — ANESTHESIA (OUTPATIENT)
Dept: ENDOSCOPY | Facility: HOSPITAL | Age: 74
End: 2024-02-01
Payer: MEDICARE

## 2024-02-01 VITALS
RESPIRATION RATE: 18 BRPM | WEIGHT: 224 LBS | OXYGEN SATURATION: 99 % | DIASTOLIC BLOOD PRESSURE: 66 MMHG | HEIGHT: 64 IN | BODY MASS INDEX: 38.24 KG/M2 | TEMPERATURE: 98 F | HEART RATE: 68 BPM | SYSTOLIC BLOOD PRESSURE: 130 MMHG

## 2024-02-01 DIAGNOSIS — D21.4 GIST (GASTROINTESTINAL STROMAL TUMOR), NON-MALIGNANT: ICD-10-CM

## 2024-02-01 PROCEDURE — 88305 TISSUE EXAM BY PATHOLOGIST: CPT | Mod: 26,,, | Performed by: STUDENT IN AN ORGANIZED HEALTH CARE EDUCATION/TRAINING PROGRAM

## 2024-02-01 PROCEDURE — 88342 IMHCHEM/IMCYTCHM 1ST ANTB: CPT | Mod: PO | Performed by: STUDENT IN AN ORGANIZED HEALTH CARE EDUCATION/TRAINING PROGRAM

## 2024-02-01 PROCEDURE — D9220A PRA ANESTHESIA: Mod: ANES,,, | Performed by: ANESTHESIOLOGY

## 2024-02-01 PROCEDURE — 27201012 HC FORCEPS, HOT/COLD, DISP: Mod: PO | Performed by: INTERNAL MEDICINE

## 2024-02-01 PROCEDURE — D9220A PRA ANESTHESIA: Mod: CRNA,,, | Performed by: NURSE ANESTHETIST, CERTIFIED REGISTERED

## 2024-02-01 PROCEDURE — 43239 EGD BIOPSY SINGLE/MULTIPLE: CPT | Mod: PO | Performed by: INTERNAL MEDICINE

## 2024-02-01 PROCEDURE — 27201089 HC SNARE, DISP (ANY): Mod: PO | Performed by: INTERNAL MEDICINE

## 2024-02-01 PROCEDURE — 88342 IMHCHEM/IMCYTCHM 1ST ANTB: CPT | Mod: 26,,, | Performed by: STUDENT IN AN ORGANIZED HEALTH CARE EDUCATION/TRAINING PROGRAM

## 2024-02-01 PROCEDURE — 43239 EGD BIOPSY SINGLE/MULTIPLE: CPT | Mod: ,,, | Performed by: INTERNAL MEDICINE

## 2024-02-01 PROCEDURE — 25000003 PHARM REV CODE 250: Mod: PO | Performed by: NURSE ANESTHETIST, CERTIFIED REGISTERED

## 2024-02-01 PROCEDURE — 63600175 PHARM REV CODE 636 W HCPCS: Mod: PO | Performed by: INTERNAL MEDICINE

## 2024-02-01 PROCEDURE — 88305 TISSUE EXAM BY PATHOLOGIST: CPT | Mod: 59,PO | Performed by: STUDENT IN AN ORGANIZED HEALTH CARE EDUCATION/TRAINING PROGRAM

## 2024-02-01 PROCEDURE — 63600175 PHARM REV CODE 636 W HCPCS: Mod: PO | Performed by: NURSE ANESTHETIST, CERTIFIED REGISTERED

## 2024-02-01 PROCEDURE — 37000008 HC ANESTHESIA 1ST 15 MINUTES: Mod: PO | Performed by: INTERNAL MEDICINE

## 2024-02-01 RX ORDER — PANTOPRAZOLE SODIUM 40 MG/1
40 TABLET, DELAYED RELEASE ORAL DAILY
Qty: 30 TABLET | Refills: 2 | Status: SHIPPED | OUTPATIENT
Start: 2024-02-01 | End: 2024-04-11

## 2024-02-01 RX ORDER — SODIUM CHLORIDE 0.9 % (FLUSH) 0.9 %
10 SYRINGE (ML) INJECTION
Status: DISCONTINUED | OUTPATIENT
Start: 2024-02-01 | End: 2024-02-01 | Stop reason: HOSPADM

## 2024-02-01 RX ORDER — PROPOFOL 10 MG/ML
VIAL (ML) INTRAVENOUS
Status: DISCONTINUED | OUTPATIENT
Start: 2024-02-01 | End: 2024-02-01

## 2024-02-01 RX ORDER — LIDOCAINE HYDROCHLORIDE 20 MG/ML
INJECTION INTRAVENOUS
Status: DISCONTINUED | OUTPATIENT
Start: 2024-02-01 | End: 2024-02-01

## 2024-02-01 RX ORDER — SODIUM CHLORIDE, SODIUM LACTATE, POTASSIUM CHLORIDE, CALCIUM CHLORIDE 600; 310; 30; 20 MG/100ML; MG/100ML; MG/100ML; MG/100ML
INJECTION, SOLUTION INTRAVENOUS CONTINUOUS
Status: DISCONTINUED | OUTPATIENT
Start: 2024-02-01 | End: 2024-02-01 | Stop reason: HOSPADM

## 2024-02-01 RX ADMIN — PROPOFOL 25 MG: 10 INJECTION, EMULSION INTRAVENOUS at 02:02

## 2024-02-01 RX ADMIN — LIDOCAINE HYDROCHLORIDE 75 MG: 20 INJECTION INTRAVENOUS at 02:02

## 2024-02-01 RX ADMIN — PROPOFOL 2 MG: 10 INJECTION, EMULSION INTRAVENOUS at 02:02

## 2024-02-01 RX ADMIN — PROPOFOL 50 MG: 10 INJECTION, EMULSION INTRAVENOUS at 02:02

## 2024-02-01 RX ADMIN — SODIUM CHLORIDE, POTASSIUM CHLORIDE, SODIUM LACTATE AND CALCIUM CHLORIDE: 600; 310; 30; 20 INJECTION, SOLUTION INTRAVENOUS at 02:02

## 2024-02-01 NOTE — ANESTHESIA PREPROCEDURE EVALUATION
02/01/2024  Nancy Figueroa is a 73 y.o., female.      Pre-op Assessment    I have reviewed the Patient Summary Reports.     I have reviewed the Nursing Notes. I have reviewed the NPO Status.   I have reviewed the Medications.     Review of Systems  Anesthesia Hx:             Denies Family Hx of Anesthesia complications.    Denies Personal Hx of Anesthesia complications.                    Cardiovascular:     Hypertension  Past MI CAD  asymptomatic CABG/stent           ECG has been reviewed.                          Pulmonary:      Shortness of breath                  Hepatic/GI:    Hiatal Hernia, GERD   GIST, sleeve gastrostomy          Endocrine:   Hypothyroidism        Morbid Obesity / BMI > 40      Physical Exam  General: Cooperative, Alert and Oriented    Airway:  Mallampati: II   Mouth Opening: Normal  TM Distance: Normal  Tongue: Normal        Anesthesia Plan  Type of Anesthesia, risks & benefits discussed:    Anesthesia Type: Gen Natural Airway  Intra-op Monitoring Plan: Standard ASA Monitors  Induction:  IV  Informed Consent: Informed consent signed with the Patient and all parties understand the risks and agree with anesthesia plan.  All questions answered.   ASA Score: 3    Ready For Surgery From Anesthesia Perspective.     .

## 2024-02-01 NOTE — H&P
History & Physical - Short Stay  Gastroenterology      SUBJECTIVE:     Procedure: EGD    Chief Complaint/Indication for Procedure: Reflux    PTA Medications   Medication Sig    amLODIPine (NORVASC) 5 MG tablet TAKE 1 TABLET(5 MG) BY MOUTH EVERY DAY    aspirin (ECOTRIN) 81 MG EC tablet Take 81 mg by mouth every evening. HOLD FOR 1 WEEK PRIOR TO SURGERY    atorvastatin (LIPITOR) 10 MG tablet Take 1 tablet (10 mg total) by mouth once daily.    carvediloL (COREG) 6.25 MG tablet TAKE 1 TABLET(6.25 MG) BY MOUTH TWICE DAILY    clopidogreL (PLAVIX) 75 mg tablet TAKE 1 TABLET(75 MG) BY MOUTH EVERY DAY    cyanocobalamin 500 MCG tablet Take 500 mcg by mouth once daily.    levothyroxine (SYNTHROID) 112 MCG tablet TAKE 1 TABLET(112 MCG) BY MOUTH BEFORE BREAKFAST    multivitamin (THERAGRAN) per tablet Take 1 tablet by mouth once daily.    topiramate (TOPAMAX) 25 MG tablet Take 1 tablet (25 mg total) by mouth 2 (two) times daily.       Review of patient's allergies indicates:  No Known Allergies     Past Medical History:   Diagnosis Date    Atherosclerotic heart disease of native coronary artery without angina pectoris 5/1/2017 4/17  Left main: normal  LAD: two diagonals  Less than 50%.  90% mid LAD Circumflex:  minimal disease, less than 20%. --  RCA: The vessel was large sized (dominant) with less than 20% stenosis.    Cancer     GIST stomach    Cataract     OU    Essential hypertension 10/18/2012    GERD (gastroesophageal reflux disease)     HTN (hypertension)     Hypothyroidism     NSTEMI (non-ST elevated myocardial infarction) 4/24/2017 4/2017    Stented coronary artery 7/26/2017 4/17 LAD-Synergy 3.0 x 28      Past Surgical History:   Procedure Laterality Date    APPENDECTOMY      BREAST CYST ASPIRATION      CARDIAC CATHETERIZATION  2017    LAD stent     CATARACT EXTRACTION Bilateral     per pt     COLONOSCOPY      COLONOSCOPY N/A 01/25/2022    Procedure: COLONOSCOPY;  Surgeon: Tanner Ron MD;  Location: [facility]  ENDO;  Service: Endoscopy;  Laterality: N/A;    ESOPHAGOGASTRODUODENOSCOPY N/A 11/18/2021    Procedure: EGD (ESOPHAGOGASTRODUODENOSCOPY);  Surgeon: Tanner Ron MD;  Location: [facility];  Service: Endoscopy;  Laterality: N/A;    HYSTERECTOMY      YANG with BSO    LAPAROSCOPIC SLEEVE GASTRECTOMY N/A 01/27/2020    Procedure: GASTRECTOMY, SLEEVE, LAPAROSCOPIC;  Surgeon: Cirilo Mathias MD;  Location: [facility] OR;  Service: General;  Laterality: N/A;    OOPHORECTOMY       Family History   Problem Relation Age of Onset    Cancer Mother 80        breast cancer    Dementia Mother     Cataracts Mother     Breast cancer Mother 70    Lung cancer Father     Cancer Father         lung cancer    Cataracts Father     Obesity Sister     Obesity Sister     Obesity Sister     Breast cancer Paternal Aunt     Cancer Paternal Grandmother         colon     Social History     Tobacco Use    Smoking status: Never    Smokeless tobacco: Never   Substance Use Topics    Alcohol use: No    Drug use: No         OBJECTIVE:     Vital Signs (Most Recent)       Physical Exam:                                                       GENERAL:  Comfortable, in no acute distress.                                 HEENT EXAM:  Nonicteric.  No adenopathy.  Oropharynx is clear.               NECK:  Supple.                                                               LUNGS:  Clear.                                                               CARDIAC:  Regular rate and rhythm.  S1, S2.  No murmur.                      ABDOMEN:  Soft, positive bowel sounds, nontender.  No hepatosplenomegaly or masses.  No rebound or guarding.                                             EXTREMITIES:  No edema.     MENTAL STATUS:  Normal, alert and oriented.      ASSESSMENT/PLAN:     Assessment: Reflux    Plan: EGD    Anesthesia Plan: General    ASA Grade: ASA 2 - Patient with mild systemic disease with no functional limitations    MALLAMPATI SCORE:  I (soft palate, uvula,  fauces, and tonsillar pillars visible)

## 2024-02-01 NOTE — TRANSFER OF CARE
"Anesthesia Transfer of Care Note    Patient: Nancy Figueroa    Procedure(s) Performed: Procedure(s) (LRB):  ESOPHAGOGASTRODUODENOSCOPY (EGD) (N/A)    Patient location: PACU    Anesthesia Type: general    Transport from OR: Transported from OR on room air with adequate spontaneous ventilation    Post pain: adequate analgesia    Post assessment: no apparent anesthetic complications and tolerated procedure well    Post vital signs: stable    Level of consciousness: responds to stimulation    Nausea/Vomiting: no nausea/vomiting    Complications: none    Transfer of care protocol was followed      Last vitals: Visit Vitals  BP (!) 164/72 (BP Location: Right arm, Patient Position: Lying)   Pulse 62   Temp 36.6 °C (97.8 °F) (Temporal)   Resp 16   Ht 5' 3.5" (1.613 m)   Wt 101.6 kg (224 lb)   SpO2 99%   Breastfeeding No   BMI 39.06 kg/m²     "

## 2024-02-01 NOTE — DISCHARGE SUMMARY
[facility] - Endoscopy  Discharge Note  Short Stay  Discharge Note  Short Stay      SUMMARY     Admit Date: 2/1/2024    Attending Physician: Jose Luis Chong MD     Discharge Physician: Jose Luis Chong MD    Discharge Date: 2/1/2024 2:50 PM    Final Diagnosis: GIST (gastrointestinal stromal tumor), non-malignant [D21.4]  Hiatal hernia [K44.9]  Gastroesophageal reflux disease, unspecified whether esophagitis present [K21.9]    Disposition: HOME OR SELF CARE    Patient Instructions:   Current Discharge Medication List        START taking these medications    Details   pantoprazole (PROTONIX) 40 MG tablet Take 1 tablet (40 mg total) by mouth once daily.  Qty: 30 tablet, Refills: 2           CONTINUE these medications which have NOT CHANGED    Details   amLODIPine (NORVASC) 5 MG tablet TAKE 1 TABLET(5 MG) BY MOUTH EVERY DAY  Qty: 90 tablet, Refills: 2    Comments: .  Associated Diagnoses: Essential hypertension      aspirin (ECOTRIN) 81 MG EC tablet Take 81 mg by mouth every evening. HOLD FOR 1 WEEK PRIOR TO SURGERY      atorvastatin (LIPITOR) 10 MG tablet Take 1 tablet (10 mg total) by mouth once daily.  Qty: 90 tablet, Refills: 3    Associated Diagnoses: Atherosclerosis of native coronary artery of native heart without angina pectoris; Stented coronary artery; Essential hypertension; History of non-ST elevation myocardial infarction (NSTEMI); Dyslipidemia      carvediloL (COREG) 6.25 MG tablet TAKE 1 TABLET(6.25 MG) BY MOUTH TWICE DAILY  Qty: 180 tablet, Refills: 4    Comments: .  Associated Diagnoses: Atherosclerosis of native coronary artery of native heart without angina pectoris; Stented coronary artery; Essential hypertension; History of non-ST elevation myocardial infarction (NSTEMI); Dyslipidemia      clopidogreL (PLAVIX) 75 mg tablet TAKE 1 TABLET(75 MG) BY MOUTH EVERY DAY  Qty: 90 tablet, Refills: 4    Associated Diagnoses: Atherosclerosis of native coronary artery of native heart without angina  pectoris; Stented coronary artery; Essential hypertension; History of non-ST elevation myocardial infarction (NSTEMI); Dyslipidemia      cyanocobalamin 500 MCG tablet Take 500 mcg by mouth once daily.      levothyroxine (SYNTHROID) 112 MCG tablet TAKE 1 TABLET(112 MCG) BY MOUTH BEFORE BREAKFAST  Qty: 90 tablet, Refills: 3    Associated Diagnoses: Hypothyroidism, unspecified type      multivitamin (THERAGRAN) per tablet Take 1 tablet by mouth once daily.      topiramate (TOPAMAX) 25 MG tablet Take 1 tablet (25 mg total) by mouth 2 (two) times daily.  Qty: 180 tablet, Refills: 0    Associated Diagnoses: Morbid obesity             Discharge Procedure Orders (must include Diet, Follow-up, Activity)    Follow Up:  Follow up with PCP as previously scheduled  Resume routine diet.  Activity as tolerated.    No driving day of procedure.

## 2024-02-01 NOTE — ANESTHESIA POSTPROCEDURE EVALUATION
Anesthesia Post Evaluation    Patient: Nancy Figueroa    Procedure(s) Performed: Procedure(s) (LRB):  ESOPHAGOGASTRODUODENOSCOPY (EGD) (N/A)    Final Anesthesia Type: general      Patient location during evaluation: PACU  Patient participation: Yes- Able to Participate  Level of consciousness: awake and alert  Post-procedure vital signs: reviewed and stable  Pain management: adequate  Airway patency: patent    PONV status at discharge: No PONV  Anesthetic complications: no      Cardiovascular status: blood pressure returned to baseline  Respiratory status: unassisted  Hydration status: euvolemic  Follow-up not needed.              Vitals Value Taken Time   /56 02/01/24 1444   Temp 36.6 °C (97.8 °F) 02/01/24 1444   Pulse 65 02/01/24 1444   Resp 16 02/01/24 1444   SpO2 99 % 02/01/24 1444         No case tracking events are documented in the log.      Pain/Pardeep Score: Pardeep Score: 10 (2/1/2024  2:44 PM)

## 2024-02-01 NOTE — PROVATION PATIENT INSTRUCTIONS
Discharge Summary/Instructions after an Endoscopic Procedure  Patient Name: Nancy Figueroa  Patient MRN: [id number]  Patient YOB: 1950 Thursday, February 1, 2024  Jose Luis Chong MD  Dear patient,  As a result of recent federal legislation (The Federal Cures Act), you may   receive lab or pathology results from your procedure in your [facility]   account before your physician is able to contact you. Your physician or   their representative will relay the results to you with their   recommendations at their soonest availability.  Thank you,  RESTRICTIONS:  During your procedure today, you received medications for sedation.  These   medications may affect your judgment, balance and coordination.  Therefore,   for 24 hours, you have the following restrictions:   - DO NOT drive a car, operate machinery, make legal/financial decisions,   sign important papers or drink alcohol.    ACTIVITY:  Today: no heavy lifting, straining or running due to procedural   sedation/anesthesia.  The following day: return to full activity including work.  DIET:  Eat and drink normally unless instructed otherwise.     TREATMENT FOR COMMON SIDE EFFECTS:  - Mild abdominal pain, nausea, belching, bloating or excessive gas:  rest,   eat lightly and use a heating pad.  - Sore Throat: treat with throat lozenges and/or gargle with warm salt   water.  - Because air was used during the procedure, expelling large amounts of air   from your rectum or belching is normal.  - If a bowel prep was taken, you may not have a bowel movement for 1-3 days.    This is normal.  SYMPTOMS TO WATCH FOR AND REPORT TO YOUR PHYSICIAN:  1. Abdominal pain or bloating, other than gas cramps.  2. Chest pain.  3. Back pain.  4. Signs of infection such as: chills or fever occurring within 24 hours   after the procedure.  5. Rectal bleeding, which would show as bright red, maroon, or black stools.   (A tablespoon of blood from the rectum is not serious,  especially if   hemorrhoids are present.)  6. Vomiting.  7. Weakness or dizziness.  GO DIRECTLY TO THE NEAREST EMERGENCY ROOM IF YOU HAVE ANY OF THE FOLLOWING:      Difficulty breathing              Chills and/or fever over 101 F   Persistent vomiting and/or vomiting blood   Severe abdominal pain   Severe chest pain   Black, tarry stools   Bleeding- more than one tablespoon   Any other symptom or condition that you feel may need urgent attention  Your doctor recommends these additional instructions:  If any biopsies were taken, your doctors clinic will contact you in 1 to 2   weeks with any results.  We are waiting for your pathology results.   Continue your present medications.   You are being discharged to home.  For questions, problems or results please call your physician - Jose Luis Chong MD at Work:  [phone].  EMERGENCY PHONE NUMBER: [phone], LAB RESULTS: [phone]  IF A COMPLICATION OR EMERGENCY SITUATION ARISES AND YOU ARE UNABLE TO REACH   YOUR PHYSICIAN - GO DIRECTLY TO THE EMERGENCY ROOM.  ___________________________________________  Nurse Signature  ___________________________________________  Patient/Designated Responsible Party Signature  Jose Luis Chong MD  2/1/2024 2:48:25 PM  This report has been verified and signed electronically.  Dear patient,  As a result of recent federal legislation (The Federal Cures Act), you may   receive lab or pathology results from your procedure in your [facility]   account before your physician is able to contact you. Your physician or   their representative will relay the results to you with their   recommendations at their soonest availability.  Thank you.  PROVATION

## 2024-02-07 LAB
FINAL PATHOLOGIC DIAGNOSIS: NORMAL
GROSS: NORMAL
Lab: NORMAL

## 2024-02-22 NOTE — PROGRESS NOTES
PATIENT: Nancy Figueroa  MRN: [id number]  DATE: 2/23/2024      Diagnosis:   1. History of gastrointestinal stromal tumor (GIST)    2. Pulmonary nodules    3. Hypertension, unspecified type    4. Hyperlipidemia, unspecified hyperlipidemia type    5. Hypothyroidism, unspecified type    6. Coronary artery disease, unspecified vessel or lesion type, unspecified whether angina present, unspecified whether native or transplanted heart          Chief Complaint: Pulmonary Nodules (6 months follow up with blood work/)      Oncologic History:      Oncologic History     Oncologic Treatment     Pathology           Subjective:    Interval History: Ms. Figueroa is a 73 y.o. female with CAD, HTN, GERD, Hypothyroidism, who presents for GIST.  Since clinic visit the patient underwent MRI of the abdomen on 09/25/2023 showing no evidence of recurrent or metastatic disease.  Patient underwent EGD on 02/01/2024 showing small hiatal hernia, gastritis, and evidence of a sleeve gastrectomy.  The patient denies CP, cough, SOB, abdominal pain, nausea, vomiting, constipation, diarrhea.  The patient denies fever, chills, night sweats, weight loss, new lumps or bumps, easy bruising or bleeding.    Prior History:  The patient was initially undergoing a gastric sleeve with a portion of the stomach resected on 1/27/20.  A GIST tumor was found in the stomach measuring 0.4x0.4x0.1cm, mitotic rate of 0/5mm2, low grade, 0.8cm from closest margin. [id number]. Stage 1A.  Pt was initially seen by oncologist Dr Waite 2/24/20 whom ordered a Ct C/A/P on 3/13/20 showing two sub 5mm nodules in the left and right lung.  The patient then underwent surveillance scans.  PET/CT 8/18/21 showed a tiny pulmonary nodul.  Most recent CT chest 7/25/23 showed a stable 4 mm ground-glass nodule in the anterior inferior right upper lobe; stable 2 mm oval pleural-based nodule smooth margins in the left lower lobe; stable 2 mm juxtapleural nodule in the right upper lobe.    Past  Medical History:   Past Medical History:   Diagnosis Date    Atherosclerotic heart disease of native coronary artery without angina pectoris 5/1/2017 4/17  Left main: normal  LAD: two diagonals  Less than 50%.  90% mid LAD Circumflex:  minimal disease, less than 20%. --  RCA: The vessel was large sized (dominant) with less than 20% stenosis.    Cancer     GIST stomach    Cataract     OU    Essential hypertension 10/18/2012    GERD (gastroesophageal reflux disease)     HTN (hypertension)     Hypothyroidism     NSTEMI (non-ST elevated myocardial infarction) 4/24/2017 4/2017    Stented coronary artery 7/26/2017 4/17 LAD-Synergy 3.0 x 28        Past Surgical HIstory:   Past Surgical History:   Procedure Laterality Date    APPENDECTOMY      BREAST CYST ASPIRATION      CARDIAC CATHETERIZATION  2017    LAD stent     CATARACT EXTRACTION Bilateral     per pt     COLONOSCOPY      COLONOSCOPY N/A 01/25/2022    Procedure: COLONOSCOPY;  Surgeon: Tanner Ron MD;  Location: [facility];  Service: Endoscopy;  Laterality: N/A;    ESOPHAGOGASTRODUODENOSCOPY N/A 11/18/2021    Procedure: EGD (ESOPHAGOGASTRODUODENOSCOPY);  Surgeon: Tanner Ron MD;  Location: [facility];  Service: Endoscopy;  Laterality: N/A;    ESOPHAGOGASTRODUODENOSCOPY N/A 2/1/2024    Procedure: ESOPHAGOGASTRODUODENOSCOPY (EGD);  Surgeon: Jose Luis Chong MD;  Location: [facility];  Service: Endoscopy;  Laterality: N/A;    HYSTERECTOMY      YANG with BSO    LAPAROSCOPIC SLEEVE GASTRECTOMY N/A 01/27/2020    Procedure: GASTRECTOMY, SLEEVE, LAPAROSCOPIC;  Surgeon: Cirilo Mathias MD;  [facility];  Service: General;  Laterality: N/A;    OOPHORECTOMY         Family History:   Family History   Problem Relation Age of Onset    Cancer Mother 80        breast cancer    Dementia Mother     Cataracts Mother     Breast cancer Mother 70    Lung cancer Father     Cancer Father         lung cancer    Cataracts Father     Obesity Sister     Obesity  Sister     Obesity Sister     Breast cancer Paternal Aunt     Cancer Paternal Grandmother         colon       Social History:  reports that she has never smoked. She has never used smokeless tobacco. She reports that she does not drink alcohol and does not use drugs.    Allergies:  Review of patient's allergies indicates:  No Known Allergies    Medications:  Current Outpatient Medications   Medication Sig Dispense Refill    amLODIPine (NORVASC) 5 MG tablet TAKE 1 TABLET(5 MG) BY MOUTH EVERY DAY 90 tablet 2    aspirin (ECOTRIN) 81 MG EC tablet Take 81 mg by mouth every evening. HOLD FOR 1 WEEK PRIOR TO SURGERY      atorvastatin (LIPITOR) 10 MG tablet Take 1 tablet (10 mg total) by mouth once daily. 90 tablet 3    carvediloL (COREG) 6.25 MG tablet TAKE 1 TABLET(6.25 MG) BY MOUTH TWICE DAILY 180 tablet 4    clopidogreL (PLAVIX) 75 mg tablet TAKE 1 TABLET(75 MG) BY MOUTH EVERY DAY 90 tablet 4    cyanocobalamin 500 MCG tablet Take 500 mcg by mouth once daily.      levothyroxine (SYNTHROID) 112 MCG tablet TAKE 1 TABLET(112 MCG) BY MOUTH BEFORE BREAKFAST 90 tablet 3    multivitamin (THERAGRAN) per tablet Take 1 tablet by mouth once daily.      pantoprazole (PROTONIX) 40 MG tablet Take 1 tablet (40 mg total) by mouth once daily. 30 tablet 2    varicella-zoster [id number], PF, (SHINGRIX) 50 mcg/0.5 mL injection Inject into the muscle. 1 each 1    topiramate (TOPAMAX) 25 MG tablet Take 1 tablet (25 mg total) by mouth 2 (two) times daily. 180 tablet 0     No current facility-administered medications for this visit.       Review of Systems   Constitutional:  Negative for appetite change, chills, fatigue, fever and unexpected weight change.   HENT:  Positive for sore throat. Negative for mouth sores and trouble swallowing.    Eyes:  Negative for photophobia, pain and visual disturbance.   Respiratory:  Negative for cough, chest tightness and shortness of breath.    Cardiovascular:  Negative for chest pain, palpitations and leg  "swelling.   Gastrointestinal:  Negative for abdominal pain, constipation, diarrhea, nausea and vomiting.   Genitourinary:  Negative for difficulty urinating, dysuria and frequency.   Musculoskeletal:  Negative for arthralgias and back pain.   Skin:  Negative for color change and rash.   Neurological:  Positive for headaches (attributes to sinuses). Negative for dizziness, weakness, light-headedness and numbness.   Hematological:  Negative for adenopathy. Does not bruise/bleed easily.   Psychiatric/Behavioral:  The patient is not nervous/anxious.        ECOG Performance Status: 0   Objective:      Vitals:   Vitals:    02/23/24 0903   BP: 115/80   BP Location: Right arm   Patient Position: Sitting   BP Method: Large (Automatic)   Pulse: 63   Resp: 18   Temp: 96.7 °F (35.9 °C)   TempSrc: Temporal   SpO2: 96%   Weight: 100.2 kg (220 lb 14.4 oz)   Height: 5' 2" (1.575 m)         Physical Exam  Constitutional:       General: She is not in acute distress.     Appearance: She is well-developed. She is not diaphoretic.   HENT:      Head: Normocephalic and atraumatic.   Eyes:      General: No scleral icterus.        Right eye: No discharge.         Left eye: No discharge.   Cardiovascular:      Rate and Rhythm: Normal rate and regular rhythm.      Heart sounds: Normal heart sounds. No murmur heard.     No friction rub. No gallop.   Pulmonary:      Effort: Pulmonary effort is normal. No respiratory distress.      Breath sounds: Normal breath sounds. No wheezing or rales.   Chest:      Chest wall: No tenderness.   Abdominal:      General: Bowel sounds are normal. There is no distension.      Palpations: Abdomen is soft. There is no mass.      Tenderness: There is no abdominal tenderness. There is no guarding or rebound.   Musculoskeletal:         General: No tenderness. Normal range of motion.   Lymphadenopathy:      Cervical: No cervical adenopathy.      Upper Body:      Right upper body: No supraclavicular or axillary " adenopathy.      Left upper body: No supraclavicular or axillary adenopathy.   Skin:     Findings: No erythema or rash.   Neurological:      Mental Status: She is alert and oriented to person, place, and time.   Psychiatric:         Behavior: Behavior normal.         Laboratory Data:  Lab Visit on 02/23/2024   Component Date Value Ref Range Status    WBC 02/23/2024 5.61  3.90 - 12.70 K/uL Final    RBC 02/23/2024 4.28  4.00 - 5.40 M/uL Final    Hemoglobin 02/23/2024 13.2  12.0 - 16.0 g/dL Final    Hematocrit 02/23/2024 38.4  37.0 - 48.5 % Final    MCV 02/23/2024 90  82 - 98 fL Final    MCH 02/23/2024 30.8  27.0 - 31.0 pg Final    MCHC 02/23/2024 34.4  32.0 - 36.0 g/dL Final    RDW 02/23/2024 13.1  11.5 - 14.5 % Final    Platelets 02/23/2024 246  150 - 450 K/uL Final    MPV 02/23/2024 11.2  9.2 - 12.9 fL Final    Immature Granulocytes 02/23/2024 0.2  0.0 - 0.5 % Final    Gran # (ANC) 02/23/2024 3.1  1.8 - 7.7 K/uL Final    Immature Grans (Abs) 02/23/2024 0.01  0.00 - 0.04 K/uL Final    Comment: Mild elevation in immature granulocytes is non specific and   can be seen in a variety of conditions including stress response,   acute inflammation, trauma and pregnancy. Correlation with other   laboratory and clinical findings is essential.      Lymph # 02/23/2024 1.9  1.0 - 4.8 K/uL Final    Mono # 02/23/2024 0.4  0.3 - 1.0 K/uL Final    Eos # 02/23/2024 0.1  0.0 - 0.5 K/uL Final    Baso # 02/23/2024 0.06  0.00 - 0.20 K/uL Final    nRBC 02/23/2024 0  0 /100 WBC Final    Gran % 02/23/2024 55.0  38.0 - 73.0 % Final    Lymph % 02/23/2024 33.9  18.0 - 48.0 % Final    Mono % 02/23/2024 7.7  4.0 - 15.0 % Final    Eosinophil % 02/23/2024 2.1  0.0 - 8.0 % Final    Basophil % 02/23/2024 1.1  0.0 - 1.9 % Final    Differential Method 02/23/2024 Automated   Final    Sodium 02/23/2024 143  136 - 145 mmol/L Final    Potassium 02/23/2024 4.4  3.5 - 5.1 mmol/L Final    Chloride 02/23/2024 112 (H)  95 - 110 mmol/L Final    CO2 02/23/2024 21  (L)  23 - 29 mmol/L Final    Glucose 02/23/2024 97  70 - 110 mg/dL Final    BUN 02/23/2024 19  8 - 23 mg/dL Final    Creatinine 02/23/2024 0.9  0.5 - 1.4 mg/dL Final    Calcium 02/23/2024 9.4  8.7 - 10.5 mg/dL Final    Total Protein 02/23/2024 7.0  6.0 - 8.4 g/dL Final    Albumin 02/23/2024 3.7  3.5 - 5.2 g/dL Final    Total Bilirubin 02/23/2024 1.3 (H)  0.1 - 1.0 mg/dL Final    Comment: For infants and newborns, interpretation of results should be based  on gestational age, weight and in agreement with clinical  observations.    Premature Infant recommended reference ranges:  Up to 24 hours.............<8.0 mg/dL  Up to 48 hours............<12.0 mg/dL  3-5 days..................<15.0 mg/dL  6-29 days.................<15.0 mg/dL      Alkaline Phosphatase 02/23/2024 83  55 - 135 U/L Final    AST 02/23/2024 14  10 - 40 U/L Final    ALT 02/23/2024 13  10 - 44 U/L Final    eGFR 02/23/2024 >60.0  >60 mL/min/1.73 m^2 Final    Anion Gap 02/23/2024 10  8 - 16 mmol/L Final         Imaging:     MRI Abdomen 9/25/23  Heart size is normal. No pericardial effusion. Lung bases are clear.     Postsurgical changes of the stomach with moderate size hiatal hernia, similar to priors.     Liver is normal in morphology and with smooth contour. Stable inferior right hepatic lobe cyst. Portal, splenic and superior mesenteric veins are patent.     The gallbladder, spleen, adrenal glands and pancreas are normal. No intra extrahepatic biliary ductal dilatation.     No hydronephrosis. Bilateral subcentimeter renal cysts.     The abdominal aorta is normal in caliber. No enlarged retroperitoneal lymph nodes.     No bowel dilatation. No ascites.     No acute osseous abnormality.    EGD 2/01/24  Impression:            - Normal esophagus.                          - Small hiatal hernia.                          - Gastritis. Biopsied.                          - A sleeve gastrectomy was found, characterized by                          healthy  appearing mucosa. Biopsied.                          - Normal examined duodenum       Assessment:       1. History of gastrointestinal stromal tumor (GIST)    2. Pulmonary nodules    3. Hypertension, unspecified type    4. Hyperlipidemia, unspecified hyperlipidemia type    5. Hypothyroidism, unspecified type    6. Coronary artery disease, unspecified vessel or lesion type, unspecified whether angina present, unspecified whether native or transplanted heart             Plan:     GIST - Pt with a stage IA GIST found incidentally during partial gastrectomy for gastric sleeve formation 1/27/20  -Tumor measured 0.4x0.4x0.1cm, mitotic rate of 0/5mm2, low grade, 0.8cm from closest margin. [id number]  -PT with metastasis risk of 0%  -MRI Abdomen 9/25/23 showing JESS  -Will repeat MRI abdomen in a month    Pulmonary Nodules - Pt with stable pulmonary nodules on CT chest 7/25/23  -Pt previously seen by Dr Hollingsworth  -No concern for metastases    HTN - amlodipine, coreg  -BP stable  -Will monitor    HLD - pt on lipitor  -MAnagement per cardiology    Hypothyroidism - pt on levothyroxine  Lab Results   Component Value Date    TSH 3.858 07/25/2023   -Management per PCP    CAD - lipitor, antihypertensives and plavix  -Pt asymptomatic  -Management per cardiology    Route Chart for Scheduling    Med Onc Chart Routing      Follow up with physician 1 month. Pt needs an MRI of the abdomen in a moth with an appt with laura cornell completed.   Follow up with ABRIL    Infusion scheduling note    Injection scheduling note    Labs    Imaging    Pharmacy appointment    Other referrals                   Devonte Morales MD  [facility]  Hematology and Oncology  [facility] [address] [facility] [address]   [place] [id number]   O: [phone]  F: [phone]

## 2024-02-23 ENCOUNTER — OFFICE VISIT (OUTPATIENT)
Dept: HEMATOLOGY/ONCOLOGY | Facility: CLINIC | Age: 74
End: 2024-02-23
Payer: MEDICARE

## 2024-02-23 ENCOUNTER — LAB VISIT (OUTPATIENT)
Dept: LAB | Facility: HOSPITAL | Age: 74
End: 2024-02-23
Attending: INTERNAL MEDICINE
Payer: MEDICARE

## 2024-02-23 VITALS
SYSTOLIC BLOOD PRESSURE: 115 MMHG | RESPIRATION RATE: 18 BRPM | HEIGHT: 62 IN | DIASTOLIC BLOOD PRESSURE: 80 MMHG | HEART RATE: 63 BPM | WEIGHT: 220.88 LBS | BODY MASS INDEX: 40.65 KG/M2 | TEMPERATURE: 97 F | OXYGEN SATURATION: 96 %

## 2024-02-23 DIAGNOSIS — I10 HYPERTENSION, UNSPECIFIED TYPE: ICD-10-CM

## 2024-02-23 DIAGNOSIS — I25.10 CORONARY ARTERY DISEASE, UNSPECIFIED VESSEL OR LESION TYPE, UNSPECIFIED WHETHER ANGINA PRESENT, UNSPECIFIED WHETHER NATIVE OR TRANSPLANTED HEART: ICD-10-CM

## 2024-02-23 DIAGNOSIS — E03.9 HYPOTHYROIDISM, UNSPECIFIED TYPE: ICD-10-CM

## 2024-02-23 DIAGNOSIS — Z85.09 HISTORY OF GASTROINTESTINAL STROMAL TUMOR (GIST): Primary | ICD-10-CM

## 2024-02-23 DIAGNOSIS — Z85.09 HISTORY OF GASTROINTESTINAL STROMAL TUMOR (GIST): ICD-10-CM

## 2024-02-23 DIAGNOSIS — R91.8 PULMONARY NODULES: ICD-10-CM

## 2024-02-23 DIAGNOSIS — E78.5 HYPERLIPIDEMIA, UNSPECIFIED HYPERLIPIDEMIA TYPE: ICD-10-CM

## 2024-02-23 LAB
ALBUMIN SERPL BCP-MCNC: 3.7 G/DL (ref 3.5–5.2)
ALP SERPL-CCNC: 83 U/L (ref 55–135)
ALT SERPL W/O P-5'-P-CCNC: 13 U/L (ref 10–44)
ANION GAP SERPL CALC-SCNC: 10 MMOL/L (ref 8–16)
AST SERPL-CCNC: 14 U/L (ref 10–40)
BASOPHILS # BLD AUTO: 0.06 K/UL (ref 0–0.2)
BASOPHILS NFR BLD: 1.1 % (ref 0–1.9)
BILIRUB SERPL-MCNC: 1.3 MG/DL (ref 0.1–1)
BUN SERPL-MCNC: 19 MG/DL (ref 8–23)
CALCIUM SERPL-MCNC: 9.4 MG/DL (ref 8.7–10.5)
CHLORIDE SERPL-SCNC: 112 MMOL/L (ref 95–110)
CO2 SERPL-SCNC: 21 MMOL/L (ref 23–29)
CREAT SERPL-MCNC: 0.9 MG/DL (ref 0.5–1.4)
DIFFERENTIAL METHOD BLD: NORMAL
EOSINOPHIL # BLD AUTO: 0.1 K/UL (ref 0–0.5)
EOSINOPHIL NFR BLD: 2.1 % (ref 0–8)
ERYTHROCYTE [DISTWIDTH] IN BLOOD BY AUTOMATED COUNT: 13.1 % (ref 11.5–14.5)
EST. GFR  (NO RACE VARIABLE): >60 ML/MIN/1.73 M^2
GLUCOSE SERPL-MCNC: 97 MG/DL (ref 70–110)
HCT VFR BLD AUTO: 38.4 % (ref 37–48.5)
HGB BLD-MCNC: 13.2 G/DL (ref 12–16)
IMM GRANULOCYTES # BLD AUTO: 0.01 K/UL (ref 0–0.04)
IMM GRANULOCYTES NFR BLD AUTO: 0.2 % (ref 0–0.5)
LYMPHOCYTES # BLD AUTO: 1.9 K/UL (ref 1–4.8)
LYMPHOCYTES NFR BLD: 33.9 % (ref 18–48)
MCH RBC QN AUTO: 30.8 PG (ref 27–31)
MCHC RBC AUTO-ENTMCNC: 34.4 G/DL (ref 32–36)
MCV RBC AUTO: 90 FL (ref 82–98)
MONOCYTES # BLD AUTO: 0.4 K/UL (ref 0.3–1)
MONOCYTES NFR BLD: 7.7 % (ref 4–15)
NEUTROPHILS # BLD AUTO: 3.1 K/UL (ref 1.8–7.7)
NEUTROPHILS NFR BLD: 55 % (ref 38–73)
NRBC BLD-RTO: 0 /100 WBC
PLATELET # BLD AUTO: 246 K/UL (ref 150–450)
PMV BLD AUTO: 11.2 FL (ref 9.2–12.9)
POTASSIUM SERPL-SCNC: 4.4 MMOL/L (ref 3.5–5.1)
PROT SERPL-MCNC: 7 G/DL (ref 6–8.4)
RBC # BLD AUTO: 4.28 M/UL (ref 4–5.4)
SODIUM SERPL-SCNC: 143 MMOL/L (ref 136–145)
WBC # BLD AUTO: 5.61 K/UL (ref 3.9–12.7)

## 2024-02-23 PROCEDURE — 85025 COMPLETE CBC W/AUTO DIFF WBC: CPT | Mod: PN | Performed by: INTERNAL MEDICINE

## 2024-02-23 PROCEDURE — 80053 COMPREHEN METABOLIC PANEL: CPT | Mod: PN | Performed by: INTERNAL MEDICINE

## 2024-02-23 PROCEDURE — 36415 COLL VENOUS BLD VENIPUNCTURE: CPT | Mod: PN | Performed by: INTERNAL MEDICINE

## 2024-02-23 PROCEDURE — 99999 PR PBB SHADOW E&M-EST. PATIENT-LVL IV: CPT | Mod: PBBFAC,,, | Performed by: INTERNAL MEDICINE

## 2024-02-23 PROCEDURE — 99214 OFFICE O/P EST MOD 30 MIN: CPT | Mod: PBBFAC,PN | Performed by: INTERNAL MEDICINE

## 2024-02-23 PROCEDURE — 99213 OFFICE O/P EST LOW 20 MIN: CPT | Mod: S$PBB,,, | Performed by: INTERNAL MEDICINE

## 2024-03-15 ENCOUNTER — HOSPITAL ENCOUNTER (OUTPATIENT)
Dept: RADIOLOGY | Facility: HOSPITAL | Age: 74
Discharge: HOME | End: 2024-03-15
Attending: INTERNAL MEDICINE
Payer: MEDICARE

## 2024-03-15 ENCOUNTER — OFFICE VISIT (OUTPATIENT)
Dept: BARIATRICS | Facility: CLINIC | Age: 74
End: 2024-03-15
Payer: MEDICARE

## 2024-03-15 VITALS
BODY MASS INDEX: 36.37 KG/M2 | WEIGHT: 213 LBS | SYSTOLIC BLOOD PRESSURE: 175 MMHG | DIASTOLIC BLOOD PRESSURE: 80 MMHG | TEMPERATURE: 98 F | RESPIRATION RATE: 16 BRPM | HEART RATE: 74 BPM | HEIGHT: 64 IN

## 2024-03-15 DIAGNOSIS — E66.9 OBESITY (BMI 30-39.9): Primary | ICD-10-CM

## 2024-03-15 DIAGNOSIS — Z90.3 HISTORY OF SLEEVE GASTRECTOMY: ICD-10-CM

## 2024-03-15 DIAGNOSIS — Z85.09 HISTORY OF GASTROINTESTINAL STROMAL TUMOR (GIST): ICD-10-CM

## 2024-03-15 DIAGNOSIS — E03.9 HYPOTHYROIDISM, UNSPECIFIED TYPE: ICD-10-CM

## 2024-03-15 DIAGNOSIS — E66.01 MORBID OBESITY: ICD-10-CM

## 2024-03-15 DIAGNOSIS — D21.4 GIST (GASTROINTESTINAL STROMAL TUMOR), NON-MALIGNANT: ICD-10-CM

## 2024-03-15 PROCEDURE — 99214 OFFICE O/P EST MOD 30 MIN: CPT | Mod: PBBFAC,PO | Performed by: NURSE PRACTITIONER

## 2024-03-15 PROCEDURE — 25500020 PHARM REV CODE 255: Mod: PO | Performed by: INTERNAL MEDICINE

## 2024-03-15 PROCEDURE — A9585 GADOBUTROL INJECTION: HCPCS | Mod: PO | Performed by: INTERNAL MEDICINE

## 2024-03-15 PROCEDURE — 74183 MRI ABD W/O CNTR FLWD CNTR: CPT | Mod: 26,,, | Performed by: RADIOLOGY

## 2024-03-15 PROCEDURE — 99999 PR PBB SHADOW E&M-EST. PATIENT-LVL IV: CPT | Mod: PBBFAC,,, | Performed by: NURSE PRACTITIONER

## 2024-03-15 PROCEDURE — 99213 OFFICE O/P EST LOW 20 MIN: CPT | Mod: S$PBB,,, | Performed by: NURSE PRACTITIONER

## 2024-03-15 PROCEDURE — 74183 MRI ABD W/O CNTR FLWD CNTR: CPT | Mod: TC,PO

## 2024-03-15 RX ORDER — TOPIRAMATE 25 MG/1
25 TABLET ORAL 2 TIMES DAILY
Qty: 180 TABLET | Refills: 0 | Status: SHIPPED | OUTPATIENT
Start: 2024-03-15 | End: 2024-06-13

## 2024-03-15 RX ORDER — GADOBUTROL 604.72 MG/ML
9 INJECTION INTRAVENOUS
Status: COMPLETED | OUTPATIENT
Start: 2024-03-15 | End: 2024-03-15

## 2024-03-15 RX ADMIN — GADOBUTROL 9 ML: 604.72 INJECTION INTRAVENOUS at 11:03

## 2024-03-15 NOTE — PROGRESS NOTES
Bariatric Surgery Post Op Note    Surgery: gastric sleeve surgery with hiatal hernia repair    Pathology- GIST  Date: 1/27/20  Initial weight: 262  Last weight: 224  Current weight: 213   Goal: 180 lb    Wt Readings from Last 10 Encounters:   03/15/24 96.6 kg (213 lb)   02/23/24 100.2 kg (220 lb 14.4 oz)   01/29/24 101.6 kg (224 lb)   12/15/23 101.6 kg (224 lb)   10/18/23 107.5 kg (237 lb)   09/22/23 107.8 kg (237 lb 9.6 oz)   09/19/23 108.9 kg (240 lb)   08/22/23 111.3 kg (245 lb 6 oz)   08/21/23 111.9 kg (246 lb 11.1 oz)   08/11/23 109.6 kg (241 lb 9.6 oz)         Comorbidities:   Past Medical History:   Diagnosis Date    Atherosclerotic heart disease of native coronary artery without angina pectoris 5/1/2017 4/17  Left main: normal  LAD: two diagonals  Less than 50%.  90% mid LAD Circumflex:  minimal disease, less than 20%. --  RCA: The vessel was large sized (dominant) with less than 20% stenosis.    Cancer     GIST stomach    Cataract     OU    Essential hypertension 10/18/2012    GERD (gastroesophageal reflux disease)     HTN (hypertension)     Hypothyroidism     NSTEMI (non-ST elevated myocardial infarction) 4/24/2017 4/2017    Stented coronary artery 7/26/2017 4/17 LAD-Synergy 3.0 x 28      Past Surgical History:   Procedure Laterality Date    APPENDECTOMY      BREAST CYST ASPIRATION      CARDIAC CATHETERIZATION  2017    LAD stent     CATARACT EXTRACTION Bilateral     per pt     COLONOSCOPY      COLONOSCOPY N/A 01/25/2022    Procedure: COLONOSCOPY;  Surgeon: Tanner Ron MD;  Location: [facility];  Service: Endoscopy;  Laterality: N/A;    ESOPHAGOGASTRODUODENOSCOPY N/A 11/18/2021    Procedure: EGD (ESOPHAGOGASTRODUODENOSCOPY);  Surgeon: Tanner Ron MD;  Location: [facility];  Service: Endoscopy;  Laterality: N/A;    ESOPHAGOGASTRODUODENOSCOPY N/A 2/1/2024    Procedure: ESOPHAGOGASTRODUODENOSCOPY (EGD);  Surgeon: Jose Luis Chong MD;  Location: [facility];  Service: Endoscopy;   Laterality: N/A;    HYSTERECTOMY      YANG with BSO    LAPAROSCOPIC SLEEVE GASTRECTOMY N/A 01/27/2020    Procedure: GASTRECTOMY, SLEEVE, LAPAROSCOPIC;  Surgeon: Cirilo Mathias MD;  Location: [facility];  Service: General;  Laterality: N/A;    OOPHORECTOMY       Family History   Problem Relation Age of Onset    Cancer Mother 80        breast cancer    Dementia Mother     Cataracts Mother     Breast cancer Mother 70    Lung cancer Father     Cancer Father         lung cancer    Cataracts Father     Obesity Sister     Obesity Sister     Obesity Sister     Breast cancer Paternal Aunt     Cancer Paternal Grandmother         colon     Social History     Tobacco Use    Smoking status: Never    Smokeless tobacco: Never   Substance Use Topics    Alcohol use: No    Drug use: No          Medications:  Current Outpatient Medications on File Prior to Visit   Medication Sig Dispense Refill    amLODIPine (NORVASC) 5 MG tablet TAKE 1 TABLET(5 MG) BY MOUTH EVERY DAY 90 tablet 2    aspirin (ECOTRIN) 81 MG EC tablet Take 81 mg by mouth every evening. HOLD FOR 1 WEEK PRIOR TO SURGERY      atorvastatin (LIPITOR) 10 MG tablet Take 1 tablet (10 mg total) by mouth once daily. 90 tablet 3    carvediloL (COREG) 6.25 MG tablet TAKE 1 TABLET(6.25 MG) BY MOUTH TWICE DAILY 180 tablet 4    clopidogreL (PLAVIX) 75 mg tablet TAKE 1 TABLET(75 MG) BY MOUTH EVERY DAY 90 tablet 4    cyanocobalamin 500 MCG tablet Take 500 mcg by mouth once daily.      levothyroxine (SYNTHROID) 112 MCG tablet TAKE 1 TABLET(112 MCG) BY MOUTH BEFORE BREAKFAST 90 tablet 3    multivitamin (THERAGRAN) per tablet Take 1 tablet by mouth once daily.      pantoprazole (PROTONIX) 40 MG tablet Take 1 tablet (40 mg total) by mouth once daily. 30 tablet 2    varicella-zoster gE-AS01B, PF, (SHINGRIX) 50 mcg/0.5 mL injection Inject into the muscle. 1 each 1    [DISCONTINUED] topiramate (TOPAMAX) 25 MG tablet Take 1 tablet (25 mg total) by mouth 2 (two) times daily. 180 tablet 0     No  "current facility-administered medications on file prior to visit.         Labs: individually reviewed and interpreted.  Most Recent Data:  CBC:   Lab Results   Component Value Date    WBC 5.61 02/23/2024    HGB 13.2 02/23/2024    HCT 38.4 02/23/2024     02/23/2024    MCV 90 02/23/2024    RDW 13.1 02/23/2024     BMP:   Lab Results   Component Value Date     02/23/2024    K 4.4 02/23/2024     (H) 02/23/2024    CO2 21 (L) 02/23/2024    BUN 19 02/23/2024    CREATININE 0.9 02/23/2024    GLU 97 02/23/2024    CALCIUM 9.4 02/23/2024    MG 1.9 01/28/2020    PHOS 3.0 01/28/2020     LFTs:   Lab Results   Component Value Date    PROT 7.0 02/23/2024    ALBUMIN 3.7 02/23/2024    BILITOT 1.3 (H) 02/23/2024    AST 14 02/23/2024    ALKPHOS 83 02/23/2024    ALT 13 02/23/2024     Coags: No results found for: "INR", "PROTIME", "PTT"  FLP:   Lab Results   Component Value Date    CHOL 146 07/25/2023    HDL 64 07/25/2023    LDLCALC 59.6 (L) 07/25/2023    TRIG 112 07/25/2023    CHOLHDL 43.8 07/25/2023     DM:   Lab Results   Component Value Date    HGBA1C 5.4 01/21/2020    HGBA1C 5.6 04/23/2017    LDLCALC 59.6 (L) 07/25/2023    CREATININE 0.9 02/23/2024     Thyroid:   Lab Results   Component Value Date    TSH 3.858 07/25/2023    FREET4 1.26 02/10/2021     Anemia:   Lab Results   Component Value Date    IRON 76 03/19/2021    TIBC 454 (H) 03/19/2021    RVVOPOVD88 498 03/19/2021     Cardiac:   Lab Results   Component Value Date    TROPONINI <0.012 04/23/2017     Urinalysis:   Lab Results   Component Value Date    COLORU Yellow 08/27/2015    SPECGRAV 1.015 08/27/2015    NITRITE Negative 08/27/2015    KETONESU Negative 08/27/2015             Review of Systems:  Review of Systems   Constitutional:  Negative for unexpected weight change.   Gastrointestinal:  Negative for abdominal pain and nausea (GERD at night, lying down, with large bites).   All other systems reviewed and are negative.          Constipation: none  Reflux: " intermittent, with large bites, at night  Vomiting: none      Body comp:  Fat Percent:  51.5%  Fat Mass:  109.6 lb  FFM:  103.4 lb  TBW: 74 lb  TBW %:  34.7 %  BMR: 1484 kcal      Wt Readings from Last 10 Encounters:   03/15/24 96.6 kg (213 lb)   02/23/24 100.2 kg (220 lb 14.4 oz)   01/29/24 101.6 kg (224 lb)   12/15/23 101.6 kg (224 lb)   10/18/23 107.5 kg (237 lb)   09/22/23 107.8 kg (237 lb 9.6 oz)   09/19/23 108.9 kg (240 lb)   08/22/23 111.3 kg (245 lb 6 oz)   08/21/23 111.9 kg (246 lb 11.1 oz)   08/11/23 109.6 kg (241 lb 9.6 oz)         Diet:  Breakfast: protein shake, 2 eggs fat free cheese- banana whole wheat toast ([employer])  Occasionally oatmeal (old fashion)   1.5 cups Coffee with half& half  Lunch: salad, chicken or protein drink  Dinner: pasta from soy/cheese  Snack: yogurt (FF/sugar free), blueberries, dragon fruit, cantaloupe and skim milk  Water: 40+ oz per day  Weight watchers points 23      Exercise:  Go to [facility]  Was outside pool for water aerobics- opening pool this year  10 acres she walks around pond several times    MVI:   Jitendra brand twice per day with iron        Physical Exam:  Vitals:    03/15/24 0935   BP: (!) 175/80   Pulse: 74   Resp: 16   Temp: 97.5 °F (36.4 °C)       Physical Exam  Vitals and nursing note reviewed.   Constitutional:       General: She is not in acute distress.     Appearance: Normal appearance. She is not ill-appearing or toxic-appearing.   HENT:      Head: Normocephalic and atraumatic.      Right Ear: External ear normal.      Left Ear: External ear normal.      Nose: Nose normal. No congestion or rhinorrhea.      Mouth/Throat:      Mouth: Mucous membranes are moist.      Pharynx: Oropharynx is clear.   Eyes:      General:         Right eye: No discharge.         Left eye: No discharge.      Conjunctiva/sclera: Conjunctivae normal.   Cardiovascular:      Rate and Rhythm: Normal rate.      Pulses: Normal pulses.   Pulmonary:      Effort: Pulmonary effort is normal.    Musculoskeletal:         General: No swelling, tenderness, deformity or signs of injury. Normal range of motion.      Right lower leg: No edema.      Left lower leg: No edema.   Skin:     General: Skin is warm and dry.      Capillary Refill: Capillary refill takes less than 2 seconds.      Coloration: Skin is not jaundiced or pale.      Findings: No bruising, erythema or rash.   Neurological:      General: No focal deficit present.      Mental Status: She is alert and oriented to person, place, and time.      Motor: No weakness.      Gait: Gait normal.   Psychiatric:         Mood and Affect: Mood normal.         Behavior: Behavior normal.         Thought Content: Thought content normal.         Judgment: Judgment normal.                 A/P: s/p Sleeve with Hernia repair    Hiatal hernia- recurred on last CT scan 7/17/2023- follow up endoscopy - appears to have intermittent GERD,   2021 EGD without Hiatal hernia noted,    2/2024 EGD without hiatal hernia noted     Counseling/Plan for patient:  Falls reviewed with patient  Continue prescribed medications   No side effects from topamax will continue   Tanita scale results reviewed with patient  Fat decreased 10 lbs  Muscle (FFM) increase 1 lb  Water decreased 1 lb  Diet adherence  Ensure  gm of protein at minimum/day- attempt 80 gm  Do not skip meals  Limits carbs- discussed changing to low calorie breads  Increasing protein with options provided  Exercise: ok for cardio and heavy lifting   Vitamins: 2 mvi daily, calcium, and b complex  Continue multivitamins- discussion of post-operative life long MVI need, including Calcium, and a B-Complex   Will monitor with [facility] protein and calcium intake   0432-2411 mg calcium per day  GIST- continue to f/u with oncology   MRI planned for today  EGD completed 2/2024 followed by GI   Med Refill- Topamax BID        Co morbidities:     1. Obesity (BMI 30-39.9)        2. History of sleeve gastrectomy        3. GIST  (gastrointestinal stromal tumor), non-malignant        4. Hypothyroidism, unspecified type        5. Morbid obesity  topiramate (TOPAMAX) 25 MG tablet            This includes face to face time and non-face to face time preparing to see the patient (eg, review of tests), obtaining and/or reviewing separately obtained history, documenting clinical information in the electronic or other health record, independently interpreting results and communicating results to the patient/family/caregiver, or care coordinator.

## 2024-03-21 NOTE — PROGRESS NOTES
PATIENT: Nancy Figueroa  MRN: [id number]  DATE: 3/22/2024      Diagnosis:   1. History of gastrointestinal stromal tumor (GIST)    2. Pulmonary nodules            Chief Complaint: No chief complaint on file.      Oncologic History:      Oncologic History     Oncologic Treatment     Pathology           Subjective:    Interval History: Ms. Figueroa is a 73 y.o. female with CAD, HTN, GERD, Hypothyroidism, who presents for GIST.  Since clinic visit the patient underwent MRI abdomen on 3/15/24 showing a stable 1.3 cm cyst in the medial inferior right hepatic lobe.  The patient denies CP, cough, SOB, abdominal pain, nausea, vomiting, constipation, diarrhea.  The patient denies fever, chills, night sweats, weight loss, new lumps or bumps, easy bruising or bleeding.    Prior History:  The patient was initially undergoing a gastric sleeve with a portion of the stomach resected on 1/27/20.  A GIST tumor was found in the stomach measuring 0.4x0.4x0.1cm, mitotic rate of 0/5mm2, low grade, 0.8cm from closest margin. [id number]. Stage 1A.  Pt was initially seen by oncologist Dr Waite 2/24/20 whom ordered a Ct C/A/P on 3/13/20 showing two sub 5mm nodules in the left and right lung.  The patient then underwent surveillance scans.  PET/CT 8/18/21 showed a tiny pulmonary nodul.  Most recent CT chest 7/25/23 showed a stable 4 mm ground-glass nodule in the anterior inferior right upper lobe; stable 2 mm oval pleural-based nodule smooth margins in the left lower lobe; stable 2 mm juxtapleural nodule in the right upper lobe.   The patient underwent MRI of the abdomen on 09/25/2023 showing no evidence of recurrent or metastatic disease.  Patient underwent EGD on 02/01/2024 showing small hiatal hernia, gastritis, and evidence of a sleeve gastrectomy.    Past Medical History:   Past Medical History:   Diagnosis Date    Atherosclerotic heart disease of native coronary artery without angina pectoris 5/1/2017 4/17  Left main: normal  LAD: two  diagonals  Less than 50%.  90% mid LAD Circumflex:  minimal disease, less than 20%. --  RCA: The vessel was large sized (dominant) with less than 20% stenosis.    Cancer     GIST stomach    Cataract     OU    Essential hypertension 10/18/2012    GERD (gastroesophageal reflux disease)     HTN (hypertension)     Hypothyroidism     NSTEMI (non-ST elevated myocardial infarction) 4/24/2017 4/2017    Stented coronary artery 7/26/2017 4/17 LAD-Synergy 3.0 x 28        Past Surgical HIstory:   Past Surgical History:   Procedure Laterality Date    APPENDECTOMY      BREAST CYST ASPIRATION      CARDIAC CATHETERIZATION  2017    LAD stent     CATARACT EXTRACTION Bilateral     per pt     COLONOSCOPY      COLONOSCOPY N/A 01/25/2022    Procedure: COLONOSCOPY;  Surgeon: Tanner Ron MD;  Location: [facility];  Service: Endoscopy;  Laterality: N/A;    ESOPHAGOGASTRODUODENOSCOPY N/A 11/18/2021    Procedure: EGD (ESOPHAGOGASTRODUODENOSCOPY);  Surgeon: Tanner Ron MD;  Location: [facility];  Service: Endoscopy;  Laterality: N/A;    ESOPHAGOGASTRODUODENOSCOPY N/A 2/1/2024    Procedure: ESOPHAGOGASTRODUODENOSCOPY (EGD);  Surgeon: Jose Luis Chong MD;  Location: [facility];  Service: Endoscopy;  Laterality: N/A;    HYSTERECTOMY      YANG with BSO    LAPAROSCOPIC SLEEVE GASTRECTOMY N/A 01/27/2020    Procedure: GASTRECTOMY, SLEEVE, LAPAROSCOPIC;  Surgeon: Cirilo Mathias MD;  Location: [facility];  Service: General;  Laterality: N/A;    OOPHORECTOMY         Family History:   Family History   Problem Relation Age of Onset    Cancer Mother 80        breast cancer    Dementia Mother     Cataracts Mother     Breast cancer Mother 70    Lung cancer Father     Cancer Father         lung cancer    Cataracts Father     Obesity Sister     Obesity Sister     Obesity Sister     Breast cancer Paternal Aunt     Cancer Paternal Grandmother         colon       Social History:  reports that she has never smoked. She has never used smokeless  tobacco. She reports that she does not drink alcohol and does not use drugs.    Allergies:  Review of patient's allergies indicates:  No Known Allergies    Medications:  Current Outpatient Medications   Medication Sig Dispense Refill    amLODIPine (NORVASC) 5 MG tablet TAKE 1 TABLET(5 MG) BY MOUTH EVERY DAY 90 tablet 2    aspirin (ECOTRIN) 81 MG EC tablet Take 81 mg by mouth every evening. HOLD FOR 1 WEEK PRIOR TO SURGERY      atorvastatin (LIPITOR) 10 MG tablet Take 1 tablet (10 mg total) by mouth once daily. 90 tablet 3    carvediloL (COREG) 6.25 MG tablet TAKE 1 TABLET(6.25 MG) BY MOUTH TWICE DAILY 180 tablet 4    clopidogreL (PLAVIX) 75 mg tablet TAKE 1 TABLET(75 MG) BY MOUTH EVERY DAY 90 tablet 4    cyanocobalamin 500 MCG tablet Take 500 mcg by mouth once daily.      levothyroxine (SYNTHROID) 112 MCG tablet TAKE 1 TABLET(112 MCG) BY MOUTH BEFORE BREAKFAST 90 tablet 3    multivitamin (THERAGRAN) per tablet Take 1 tablet by mouth once daily.      pantoprazole (PROTONIX) 40 MG tablet Take 1 tablet (40 mg total) by mouth once daily. 30 tablet 2    topiramate (TOPAMAX) 25 MG tablet Take 1 tablet (25 mg total) by mouth 2 (two) times daily. 180 tablet 0    varicella-zoster gE-AS01B, PF, (SHINGRIX) 50 mcg/0.5 mL injection Inject into the muscle. 1 each 1     No current facility-administered medications for this visit.       Review of Systems   Constitutional:  Negative for chills, fatigue, fever and unexpected weight change.   Respiratory:  Negative for cough and shortness of breath.    Cardiovascular:  Negative for chest pain and palpitations.   Gastrointestinal:  Negative for abdominal pain, constipation, diarrhea, nausea and vomiting.   Skin:  Negative for rash.   Neurological:  Negative for headaches.   Hematological:  Negative for adenopathy. Does not bruise/bleed easily.       ECOG Performance Status: 0   Objective:      Vitals:   Vitals:    03/22/24 1303   BP: 112/82   BP Location: Right arm   Patient Position:  "Sitting   BP Method: Large (Manual)   Pulse: 69   Resp: 18   Temp: 96.6 °F (35.9 °C)   TempSrc: Temporal   SpO2: 97%   Weight: 98.7 kg (217 lb 9.5 oz)   Height: 5' 3.5" (1.613 m)           Physical Exam  Constitutional:       General: She is not in acute distress.     Appearance: She is well-developed. She is not diaphoretic.   HENT:      Head: Normocephalic and atraumatic.   Cardiovascular:      Rate and Rhythm: Normal rate and regular rhythm.      Heart sounds: Normal heart sounds. No murmur heard.     No friction rub. No gallop.   Pulmonary:      Effort: Pulmonary effort is normal. No respiratory distress.      Breath sounds: Normal breath sounds. No wheezing or rales.   Chest:      Chest wall: No tenderness.   Abdominal:      General: Bowel sounds are normal. There is no distension.      Palpations: Abdomen is soft. There is no mass.      Tenderness: There is no abdominal tenderness. There is no guarding or rebound.   Lymphadenopathy:      Cervical: No cervical adenopathy.      Upper Body:      Right upper body: No supraclavicular or axillary adenopathy.      Left upper body: No supraclavicular or axillary adenopathy.   Skin:     Findings: No erythema or rash.   Neurological:      Mental Status: She is alert and oriented to person, place, and time.   Psychiatric:         Behavior: Behavior normal.         Laboratory Data:  No visits with results within 1 Week(s) from this visit.   Latest known visit with results is:   Lab Visit on 02/23/2024   Component Date Value Ref Range Status    WBC 02/23/2024 5.61  3.90 - 12.70 K/uL Final    RBC 02/23/2024 4.28  4.00 - 5.40 M/uL Final    Hemoglobin 02/23/2024 13.2  12.0 - 16.0 g/dL Final    Hematocrit 02/23/2024 38.4  37.0 - 48.5 % Final    MCV 02/23/2024 90  82 - 98 fL Final    MCH 02/23/2024 30.8  27.0 - 31.0 pg Final    MCHC 02/23/2024 34.4  32.0 - 36.0 g/dL Final    RDW 02/23/2024 13.1  11.5 - 14.5 % Final    Platelets 02/23/2024 246  150 - 450 K/uL Final    MPV " 02/23/2024 11.2  9.2 - 12.9 fL Final    Immature Granulocytes 02/23/2024 0.2  0.0 - 0.5 % Final    Gran # (ANC) 02/23/2024 3.1  1.8 - 7.7 K/uL Final    Immature Grans (Abs) 02/23/2024 0.01  0.00 - 0.04 K/uL Final    Comment: Mild elevation in immature granulocytes is non specific and   can be seen in a variety of conditions including stress response,   acute inflammation, trauma and pregnancy. Correlation with other   laboratory and clinical findings is essential.      Lymph # 02/23/2024 1.9  1.0 - 4.8 K/uL Final    Mono # 02/23/2024 0.4  0.3 - 1.0 K/uL Final    Eos # 02/23/2024 0.1  0.0 - 0.5 K/uL Final    Baso # 02/23/2024 0.06  0.00 - 0.20 K/uL Final    nRBC 02/23/2024 0  0 /100 WBC Final    Gran % 02/23/2024 55.0  38.0 - 73.0 % Final    Lymph % 02/23/2024 33.9  18.0 - 48.0 % Final    Mono % 02/23/2024 7.7  4.0 - 15.0 % Final    Eosinophil % 02/23/2024 2.1  0.0 - 8.0 % Final    Basophil % 02/23/2024 1.1  0.0 - 1.9 % Final    Differential Method 02/23/2024 Automated   Final    Sodium 02/23/2024 143  136 - 145 mmol/L Final    Potassium 02/23/2024 4.4  3.5 - 5.1 mmol/L Final    Chloride 02/23/2024 112 (H)  95 - 110 mmol/L Final    CO2 02/23/2024 21 (L)  23 - 29 mmol/L Final    Glucose 02/23/2024 97  70 - 110 mg/dL Final    BUN 02/23/2024 19  8 - 23 mg/dL Final    Creatinine 02/23/2024 0.9  0.5 - 1.4 mg/dL Final    Calcium 02/23/2024 9.4  8.7 - 10.5 mg/dL Final    Total Protein 02/23/2024 7.0  6.0 - 8.4 g/dL Final    Albumin 02/23/2024 3.7  3.5 - 5.2 g/dL Final    Total Bilirubin 02/23/2024 1.3 (H)  0.1 - 1.0 mg/dL Final    Comment: For infants and newborns, interpretation of results should be based  on gestational age, weight and in agreement with clinical  observations.    Premature Infant recommended reference ranges:  Up to 24 hours.............<8.0 mg/dL  Up to 48 hours............<12.0 mg/dL  3-5 days..................<15.0 mg/dL  6-29 days.................<15.0 mg/dL      Alkaline Phosphatase 02/23/2024 83  55  - 135 U/L Final    AST 02/23/2024 14  10 - 40 U/L Final    ALT 02/23/2024 13  10 - 44 U/L Final    eGFR 02/23/2024 >60.0  >60 mL/min/1.73 m^2 Final    Anion Gap 02/23/2024 10  8 - 16 mmol/L Final         Imaging:     MRI Abdomen 3/15/24  Lower thorax: Heart is normal in size. No pericardial effusion. Postsurgical changes partial gastrectomy and moderate-sized hiatal hernia, without significant interval change in appear as compared to the prior exam. Minimal compressive atelectasis along the medial left lung base. No pleural effusion.     Liver: Normal homogeneous background signal. Stable 1.3 cm bilocular cyst along the medial inferior right hepatic lobe. No new focal hepatic lesions. Hepatic and portal veins are patent.     Biliary: Gallbladder is normal. No intrahepatic or extrahepatic biliary dilatation.     Pancreas: Normal. No pancreatic ductal dilatation.     Spleen: Normal size. No focal lesions.     Adrenal glands: Normal.     Kidneys: Stable bilateral subcentimeter renal cortical cysts. No renal masses. No hydronephrosis.     Miscellaneous: Visualized bowel loops are normal. No evidence for lymphadenopathy. Small fat-containing periumbilical hernia.     MRCP:     Gallbladder is normal. Central and suprapancreatic extrahepatic ducts measure within normal limits. No retained stones, strictures, or intrahepatic biliary ductal dilatation is present. Pancreatic duct measures within normal limits.       Assessment:       1. History of gastrointestinal stromal tumor (GIST)    2. Pulmonary nodules               Plan:     GIST - Pt with a stage IA GIST found incidentally during partial gastrectomy for gastric sleeve formation 1/27/20  -Tumor measured 0.4x0.4x0.1cm, mitotic rate of 0/5mm2, low grade, 0.8cm from closest margin. [id number]  -PT with metastasis risk of 0%  -MRI Abdomen 9/25/23 and 3/15/24 showed JESS  -Will repeat MRI abdomen in 6 months    Pulmonary Nodules - Pt with stable pulmonary nodules on CT chest  7/25/23  -Pt previously seen by Dr Hollingsworth  -No concern for metastases  -Pt to follow up with pulmonary    Route Chart for Scheduling    Med Onc Chart Routing      Follow up with physician 6 months. Pt needs an appt with Dr Hollingsworth in pulmonary for pulmonary nodule.  Pt needs an MRI abdomen, CBC, CMP in 6 months with a return appt.   Follow up with [facility]    Infusion scheduling note    Injection scheduling note    Labs    Imaging    Pharmacy appointment    Other referrals                   Devonte Morales MD  [facility]  Hematology and Oncology  [facility] [address]   [place] [address]   O: [phone]  F: [phone]

## 2024-03-22 ENCOUNTER — OFFICE VISIT (OUTPATIENT)
Dept: HEMATOLOGY/ONCOLOGY | Facility: CLINIC | Age: 74
End: 2024-03-22
Payer: MEDICARE

## 2024-03-22 VITALS
HEART RATE: 69 BPM | OXYGEN SATURATION: 97 % | WEIGHT: 217.63 LBS | TEMPERATURE: 97 F | DIASTOLIC BLOOD PRESSURE: 82 MMHG | HEIGHT: 64 IN | BODY MASS INDEX: 37.16 KG/M2 | RESPIRATION RATE: 18 BRPM | SYSTOLIC BLOOD PRESSURE: 112 MMHG

## 2024-03-22 DIAGNOSIS — Z85.09 HISTORY OF GASTROINTESTINAL STROMAL TUMOR (GIST): Primary | ICD-10-CM

## 2024-03-22 DIAGNOSIS — R91.8 PULMONARY NODULES: ICD-10-CM

## 2024-03-22 PROBLEM — C49.A2 GASTROINTESTINAL STROMAL TUMOR (GIST) OF STOMACH: Status: RESOLVED | Noted: 2020-02-24 | Resolved: 2024-03-22

## 2024-03-22 PROCEDURE — 99214 OFFICE O/P EST MOD 30 MIN: CPT | Mod: PBBFAC,PN | Performed by: INTERNAL MEDICINE

## 2024-03-22 PROCEDURE — 99214 OFFICE O/P EST MOD 30 MIN: CPT | Mod: S$PBB,,, | Performed by: INTERNAL MEDICINE

## 2024-03-22 PROCEDURE — 99999 PR PBB SHADOW E&M-EST. PATIENT-LVL IV: CPT | Mod: PBBFAC,,, | Performed by: INTERNAL MEDICINE

## 2024-04-08 DIAGNOSIS — Z95.5 STENTED CORONARY ARTERY: Chronic | ICD-10-CM

## 2024-04-08 DIAGNOSIS — I25.2 HISTORY OF NON-ST ELEVATION MYOCARDIAL INFARCTION (NSTEMI): ICD-10-CM

## 2024-04-08 DIAGNOSIS — I10 ESSENTIAL HYPERTENSION: ICD-10-CM

## 2024-04-08 DIAGNOSIS — E78.5 DYSLIPIDEMIA: ICD-10-CM

## 2024-04-08 DIAGNOSIS — I25.10 ATHEROSCLEROSIS OF NATIVE CORONARY ARTERY OF NATIVE HEART WITHOUT ANGINA PECTORIS: ICD-10-CM

## 2024-04-08 RX ORDER — CLOPIDOGREL BISULFATE 75 MG/1
TABLET ORAL
Qty: 90 TABLET | Refills: 4 | Status: SHIPPED | OUTPATIENT
Start: 2024-04-08

## 2024-04-11 RX ORDER — PANTOPRAZOLE SODIUM 40 MG/1
TABLET, DELAYED RELEASE ORAL
Qty: 30 TABLET | Refills: 2 | Status: SHIPPED | OUTPATIENT
Start: 2024-04-11

## 2024-04-22 ENCOUNTER — HOSPITAL ENCOUNTER (OUTPATIENT)
Dept: RADIOLOGY | Facility: HOSPITAL | Age: 74
Discharge: HOME | End: 2024-04-22
Attending: INTERNAL MEDICINE
Payer: MEDICARE

## 2024-04-22 DIAGNOSIS — R91.1 LUNG NODULE: ICD-10-CM

## 2024-04-22 DIAGNOSIS — R06.02 SHORTNESS OF BREATH: ICD-10-CM

## 2024-04-22 PROCEDURE — 71046 X-RAY EXAM CHEST 2 VIEWS: CPT | Mod: 26,,, | Performed by: RADIOLOGY

## 2024-04-22 PROCEDURE — 71046 X-RAY EXAM CHEST 2 VIEWS: CPT | Mod: TC,FY,PO

## 2024-04-29 PROBLEM — C49.A0 GASTROINTESTINAL STROMAL TUMOR (GIST): Status: ACTIVE | Noted: 2020-02-24

## 2024-07-08 ENCOUNTER — PATIENT MESSAGE (OUTPATIENT)
Dept: GASTROENTEROLOGY | Facility: CLINIC | Age: 74
End: 2024-07-08
Payer: MEDICARE

## 2024-07-08 DIAGNOSIS — E03.9 HYPOTHYROIDISM, UNSPECIFIED TYPE: ICD-10-CM

## 2024-07-08 DIAGNOSIS — I10 ESSENTIAL HYPERTENSION: ICD-10-CM

## 2024-07-08 DIAGNOSIS — E66.01 MORBID OBESITY: ICD-10-CM

## 2024-07-08 RX ORDER — TOPIRAMATE 25 MG/1
25 TABLET ORAL 2 TIMES DAILY
Qty: 180 TABLET | Refills: 0 | Status: SHIPPED | OUTPATIENT
Start: 2024-07-08

## 2024-07-08 NOTE — TELEPHONE ENCOUNTER
No care due was identified.  Health [facility] Embedded Care Due Messages. Reference number: [id number].   7/08/2024 10:01:05 AM CDT

## 2024-07-09 RX ORDER — AMLODIPINE BESYLATE 5 MG/1
5 TABLET ORAL
Qty: 90 TABLET | Refills: 0 | Status: SHIPPED | OUTPATIENT
Start: 2024-07-09

## 2024-07-09 RX ORDER — LEVOTHYROXINE SODIUM 112 UG/1
TABLET ORAL
Qty: 90 TABLET | Refills: 0 | Status: SHIPPED | OUTPATIENT
Start: 2024-07-09

## 2024-07-09 RX ORDER — PANTOPRAZOLE SODIUM 40 MG/1
40 TABLET, DELAYED RELEASE ORAL DAILY
Qty: 90 TABLET | Refills: 2 | Status: SHIPPED | OUTPATIENT
Start: 2024-07-09

## 2024-07-09 NOTE — TELEPHONE ENCOUNTER
Refill Decision Note   Nancy Figueroa  is requesting a refill authorization.  Brief Assessment and Rationale for Refill:  Approve     Medication Therapy Plan:  Levothyroxine- MRM resolved; APPROVE      Comments:     Note composed:3:51 AM 07/09/2024

## 2024-07-17 ENCOUNTER — OFFICE VISIT (OUTPATIENT)
Dept: FAMILY MEDICINE | Facility: CLINIC | Age: 74
End: 2024-07-17
Payer: MEDICARE

## 2024-07-17 ENCOUNTER — LAB VISIT (OUTPATIENT)
Dept: LAB | Facility: HOSPITAL | Age: 74
End: 2024-07-17
Attending: FAMILY MEDICINE
Payer: MEDICARE

## 2024-07-17 VITALS
DIASTOLIC BLOOD PRESSURE: 90 MMHG | HEART RATE: 77 BPM | BODY MASS INDEX: 35.61 KG/M2 | SYSTOLIC BLOOD PRESSURE: 112 MMHG | WEIGHT: 208.56 LBS | HEIGHT: 64 IN | OXYGEN SATURATION: 96 %

## 2024-07-17 DIAGNOSIS — Z78.0 POST-MENOPAUSAL: ICD-10-CM

## 2024-07-17 DIAGNOSIS — I25.2 HISTORY OF NON-ST ELEVATION MYOCARDIAL INFARCTION (NSTEMI): ICD-10-CM

## 2024-07-17 DIAGNOSIS — E03.9 ACQUIRED HYPOTHYROIDISM: ICD-10-CM

## 2024-07-17 DIAGNOSIS — I70.0 AORTIC ATHEROSCLEROSIS: ICD-10-CM

## 2024-07-17 DIAGNOSIS — E66.01 SEVERE OBESITY: ICD-10-CM

## 2024-07-17 DIAGNOSIS — I10 ESSENTIAL HYPERTENSION: ICD-10-CM

## 2024-07-17 DIAGNOSIS — B07.9 WART OF HAND: ICD-10-CM

## 2024-07-17 DIAGNOSIS — I10 ESSENTIAL HYPERTENSION: Primary | ICD-10-CM

## 2024-07-17 DIAGNOSIS — Z95.5 STENTED CORONARY ARTERY: Chronic | ICD-10-CM

## 2024-07-17 LAB
ALBUMIN SERPL BCP-MCNC: 3.9 G/DL (ref 3.5–5.2)
ALP SERPL-CCNC: 86 U/L (ref 55–135)
ALT SERPL W/O P-5'-P-CCNC: 13 U/L (ref 10–44)
ANION GAP SERPL CALC-SCNC: 6 MMOL/L (ref 8–16)
AST SERPL-CCNC: 14 U/L (ref 10–40)
BASOPHILS # BLD AUTO: 0.07 K/UL (ref 0–0.2)
BASOPHILS NFR BLD: 1 % (ref 0–1.9)
BILIRUB SERPL-MCNC: 1 MG/DL (ref 0.1–1)
BUN SERPL-MCNC: 22 MG/DL (ref 8–23)
CALCIUM SERPL-MCNC: 9.6 MG/DL (ref 8.7–10.5)
CHLORIDE SERPL-SCNC: 113 MMOL/L (ref 95–110)
CO2 SERPL-SCNC: 23 MMOL/L (ref 23–29)
CREAT SERPL-MCNC: 0.9 MG/DL (ref 0.5–1.4)
DIFFERENTIAL METHOD BLD: NORMAL
EOSINOPHIL # BLD AUTO: 0.2 K/UL (ref 0–0.5)
EOSINOPHIL NFR BLD: 2.2 % (ref 0–8)
ERYTHROCYTE [DISTWIDTH] IN BLOOD BY AUTOMATED COUNT: 12.8 % (ref 11.5–14.5)
EST. GFR  (NO RACE VARIABLE): >60 ML/MIN/1.73 M^2
GLUCOSE SERPL-MCNC: 105 MG/DL (ref 70–110)
HCT VFR BLD AUTO: 42.5 % (ref 37–48.5)
HGB BLD-MCNC: 14.1 G/DL (ref 12–16)
IMM GRANULOCYTES # BLD AUTO: 0.02 K/UL (ref 0–0.04)
IMM GRANULOCYTES NFR BLD AUTO: 0.3 % (ref 0–0.5)
LYMPHOCYTES # BLD AUTO: 1.8 K/UL (ref 1–4.8)
LYMPHOCYTES NFR BLD: 27.4 % (ref 18–48)
MCH RBC QN AUTO: 30.6 PG (ref 27–31)
MCHC RBC AUTO-ENTMCNC: 33.2 G/DL (ref 32–36)
MCV RBC AUTO: 92 FL (ref 82–98)
MONOCYTES # BLD AUTO: 0.6 K/UL (ref 0.3–1)
MONOCYTES NFR BLD: 8.3 % (ref 4–15)
NEUTROPHILS # BLD AUTO: 4.1 K/UL (ref 1.8–7.7)
NEUTROPHILS NFR BLD: 60.8 % (ref 38–73)
NRBC BLD-RTO: 0 /100 WBC
PLATELET # BLD AUTO: 269 K/UL (ref 150–450)
PMV BLD AUTO: 12 FL (ref 9.2–12.9)
POTASSIUM SERPL-SCNC: 4.7 MMOL/L (ref 3.5–5.1)
PROT SERPL-MCNC: 7.5 G/DL (ref 6–8.4)
RBC # BLD AUTO: 4.61 M/UL (ref 4–5.4)
SODIUM SERPL-SCNC: 142 MMOL/L (ref 136–145)
T4 FREE SERPL-MCNC: 1.11 NG/DL (ref 0.71–1.51)
TSH SERPL DL<=0.005 MIU/L-ACNC: 2.92 UIU/ML (ref 0.4–4)
WBC # BLD AUTO: 6.71 K/UL (ref 3.9–12.7)

## 2024-07-17 PROCEDURE — 84439 ASSAY OF FREE THYROXINE: CPT | Performed by: FAMILY MEDICINE

## 2024-07-17 PROCEDURE — 36415 COLL VENOUS BLD VENIPUNCTURE: CPT | Mod: PO | Performed by: FAMILY MEDICINE

## 2024-07-17 PROCEDURE — 85025 COMPLETE CBC W/AUTO DIFF WBC: CPT | Performed by: FAMILY MEDICINE

## 2024-07-17 PROCEDURE — 84443 ASSAY THYROID STIM HORMONE: CPT | Performed by: FAMILY MEDICINE

## 2024-07-17 PROCEDURE — 99214 OFFICE O/P EST MOD 30 MIN: CPT | Mod: S$PBB,,, | Performed by: FAMILY MEDICINE

## 2024-07-17 PROCEDURE — 99999 PR PBB SHADOW E&M-EST. PATIENT-LVL IV: CPT | Mod: PBBFAC,,, | Performed by: FAMILY MEDICINE

## 2024-07-17 PROCEDURE — 80053 COMPREHEN METABOLIC PANEL: CPT | Performed by: FAMILY MEDICINE

## 2024-07-17 PROCEDURE — 99214 OFFICE O/P EST MOD 30 MIN: CPT | Mod: PBBFAC,PO | Performed by: FAMILY MEDICINE

## 2024-07-17 NOTE — PROGRESS NOTES
Subjective:       Patient ID: Nancy Figueroa is a 74 y.o. female.    Chief Complaint: Annual Exam (Annual visit), Warts (Pt states wart returned after being removed from last visit), and Ear Fullness (Pt states her ears have been feeling full for about a month)    Pt is known to me.  Pt is here for followup of chronic medical issues.  The pt is doing well in general with no acute complaints.  The pt needs to go back to dermatology for wart removal.  She reports ear fullness (left) with ringing.  She thinks her hearing is fine.  She has no ear pain.  She follows with Dr. Taylor for CAD with 1 stent.  She is doing well.   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  She is stable and sees them annually.  Discussed health maintenance with pt and will schedule appropriate studies and/or immunizations.      Warts    Ear Fullness       Review of Systems    Objective:      Physical Exam  Constitutional:       Appearance: She is well-developed. She is obese. She is not ill-appearing.   HENT:      Head: Normocephalic.   Eyes:      Conjunctiva/sclera: Conjunctivae normal.      Pupils: Pupils are equal, round, and reactive to light.   Neck:      Thyroid: No thyromegaly.      Vascular: No carotid bruit.   Cardiovascular:      Rate and Rhythm: Normal rate and regular rhythm.      Heart sounds: Normal heart sounds.   Pulmonary:      Effort: Pulmonary effort is normal.      Breath sounds: Normal breath sounds.   Abdominal:      General: Bowel sounds are normal.      Palpations: Abdomen is soft.      Tenderness: There is no abdominal tenderness.   Musculoskeletal:         General: No tenderness or deformity. Normal range of motion.      Cervical back: Normal range of motion and neck supple.      Right lower leg: No edema.      Left lower leg: No edema.   Lymphadenopathy:      Cervical: No cervical adenopathy.   Skin:     General: Skin is warm and dry.   Neurological:      Mental Status: She is alert and oriented to person, place, and  time.      Cranial Nerves: No cranial nerve deficit.      Motor: No abnormal muscle tone.      Coordination: Coordination normal.      Deep Tendon Reflexes: Reflexes normal.   Psychiatric:         Behavior: Behavior normal.         Assessment:       1. Essential hypertension    2. Wart of hand    3. History of non-ST elevation myocardial infarction (NSTEMI)    4. Stented coronary artery    5. Severe obesity    6. Acquired hypothyroidism    7. Post-menopausal        Plan:       Nancy was seen today for annual exam, warts and ear fullness.    Diagnoses and all orders for this visit:    Essential hypertension  -     CBC Auto Differential; Future  -     Comprehensive Metabolic Panel; Future    Wart of hand  -     Ambulatory referral/consult to Dermatology; Future    History of non-ST elevation myocardial infarction (NSTEMI)    Stented coronary artery    Severe obesity    Acquired hypothyroidism  -     TSH; Future  -     T4, Free; Future    Post-menopausal  -     DXA Bone Density Axial Skeleton 1 or more sites; Future      During this visit, I reviewed the pt's history, medications, allergies, and problem list.

## 2024-07-29 ENCOUNTER — OFFICE VISIT (OUTPATIENT)
Dept: URGENT CARE | Facility: CLINIC | Age: 74
End: 2024-07-29
Payer: MEDICARE

## 2024-07-29 VITALS
BODY MASS INDEX: 35.51 KG/M2 | HEIGHT: 64 IN | DIASTOLIC BLOOD PRESSURE: 78 MMHG | HEART RATE: 97 BPM | WEIGHT: 208 LBS | TEMPERATURE: 99 F | SYSTOLIC BLOOD PRESSURE: 158 MMHG | OXYGEN SATURATION: 96 % | RESPIRATION RATE: 20 BRPM

## 2024-07-29 DIAGNOSIS — J06.9 UPPER RESPIRATORY TRACT INFECTION, UNSPECIFIED TYPE: Primary | ICD-10-CM

## 2024-07-29 DIAGNOSIS — Z20.822 CLOSE EXPOSURE TO COVID-19 VIRUS: ICD-10-CM

## 2024-07-29 LAB
CTP QC/QA: YES
SARS-COV-2 AG RESP QL IA.RAPID: NEGATIVE

## 2024-07-29 PROCEDURE — 87811 SARS-COV-2 COVID19 W/OPTIC: CPT | Mod: QW,S$GLB,, | Performed by: PHYSICIAN ASSISTANT

## 2024-07-29 PROCEDURE — 99213 OFFICE O/P EST LOW 20 MIN: CPT | Mod: S$GLB,,, | Performed by: PHYSICIAN ASSISTANT

## 2024-07-29 RX ORDER — IPRATROPIUM BROMIDE 21 UG/1
2 SPRAY, METERED NASAL 3 TIMES DAILY
Qty: 30 ML | Refills: 0 | Status: SHIPPED | OUTPATIENT
Start: 2024-07-29

## 2024-07-29 RX ORDER — PROMETHAZINE HYDROCHLORIDE 6.25 MG/5ML
SYRUP ORAL
Qty: 120 ML | Refills: 1 | Status: SHIPPED | OUTPATIENT
Start: 2024-07-29

## 2024-07-29 NOTE — PATIENT INSTRUCTIONS

## 2024-07-29 NOTE — PROGRESS NOTES
"Subjective:      Patient ID: Nancy Figueroa is a 74 y.o. female.    Vitals:  height is 5' 3.5" (1.613 m) and weight is 94.3 kg (208 lb). Her temperature is 99 °F (37.2 °C). Her blood pressure is 158/78 (abnormal) and her pulse is 97. Her respiration is 20 and oxygen saturation is 96%.     Chief Complaint: COVID-19 Concerns    74 year old female presents today with a close exposure to COVID. Symptoms started 07/27/2024. Sore throat, post nasal drip, sinuses, cough, nasal drainage, neck pain, HA. Treatments at home includes nothing. Never had COVID before that she knows of.     Cough  This is a new problem. The current episode started in the past 7 days. The problem has been unchanged. The cough is Non-productive. Associated symptoms include chills, headaches, myalgias, postnasal drip, rhinorrhea and a sore throat. Pertinent negatives include no chest pain, ear pain, eye redness, fever, heartburn, hemoptysis, rash, shortness of breath or wheezing. She has tried nothing for the symptoms.       Constitution: Positive for chills. Negative for sweating, fatigue and fever.   HENT:  Positive for congestion, postnasal drip, sinus pain, sinus pressure and sore throat. Negative for ear pain, drooling, trouble swallowing and voice change.    Neck: Negative for neck pain, neck stiffness, painful lymph nodes and neck swelling.   Cardiovascular:  Negative for chest pain, leg swelling, palpitations, sob on exertion and passing out.   Eyes:  Negative for eye pain, eye redness, photophobia, double vision, blurred vision and eyelid swelling.   Respiratory:  Positive for cough. Negative for chest tightness, sputum production, bloody sputum, shortness of breath, stridor and wheezing.    Gastrointestinal:  Negative for abdominal pain, abdominal bloating, nausea, vomiting, constipation, diarrhea and heartburn.   Musculoskeletal:  Positive for muscle ache. Negative for joint pain, joint swelling, abnormal ROM of joint, back pain and " muscle cramps.   Skin:  Negative for rash and hives.   Allergic/Immunologic: Negative for seasonal allergies, food allergies, hives, itching and sneezing.   Neurological:  Positive for headaches. Negative for dizziness, light-headedness, passing out, loss of balance, altered mental status, loss of consciousness and seizures.   Hematologic/Lymphatic: Negative for swollen lymph nodes.   Psychiatric/Behavioral:  Negative for altered mental status and nervous/anxious. The patient is not nervous/anxious.       Objective:     Physical Exam   Constitutional: She is oriented to person, place, and time. She appears well-developed. She is cooperative.  Non-toxic appearance. She does not appear ill. No distress.   HENT:   Head: Normocephalic and atraumatic.   Ears:   Right Ear: Hearing, tympanic membrane, external ear and ear canal normal.   Left Ear: Hearing, tympanic membrane, external ear and ear canal normal.   Nose: Mucosal edema and rhinorrhea present. No nasal deformity. No epistaxis. Right sinus exhibits no maxillary sinus tenderness and no frontal sinus tenderness. Left sinus exhibits no maxillary sinus tenderness and no frontal sinus tenderness.   Mouth/Throat: Uvula is midline and mucous membranes are normal. No trismus in the jaw. Normal dentition. No uvula swelling. Posterior oropharyngeal erythema and cobblestoning present. No oropharyngeal exudate, posterior oropharyngeal edema or tonsillar abscesses. No tonsillar exudate.   Eyes: Conjunctivae and lids are normal. No scleral icterus.   Neck: Trachea normal and phonation normal. Neck supple. No edema present. No erythema present. No neck rigidity present.   Cardiovascular: Normal rate, regular rhythm, normal heart sounds and normal pulses.   Pulmonary/Chest: Effort normal and breath sounds normal. No accessory muscle usage or stridor. No respiratory distress. She has no decreased breath sounds. She has no wheezes. She has no rhonchi. She has no rales.    Abdominal: Normal appearance.   Musculoskeletal: Normal range of motion.         General: No deformity or edema. Normal range of motion.   Lymphadenopathy:     She has no cervical adenopathy.   Neurological: She is alert and oriented to person, place, and time. She exhibits normal muscle tone. Coordination normal.   Skin: Skin is warm, dry, intact, not diaphoretic, not pale and no rash. Capillary refill takes less than 2 seconds.   Psychiatric: Her speech is normal and behavior is normal. Judgment and thought content normal.   Nursing note and vitals reviewed.    Results for orders placed or performed in visit on 07/29/24   SARS Coronavirus 2 Antigen, POCT Manual Read   Result Value Ref Range    SARS Coronavirus 2 Antigen Negative Negative     Acceptable Yes      Assessment:     1. Upper respiratory tract infection, unspecified type    2. Close exposure to COVID-19 virus        Plan:       Upper respiratory tract infection, unspecified type    Close exposure to COVID-19 virus  -     SARS Coronavirus 2 Antigen, POCT Manual Read    Other orders  -     promethazine (PHENERGAN) 6.25 mg/5 mL syrup; Take 10 mLs at night as needed.  Dispense: 120 mL; Refill: 1  -     ipratropium (ATROVENT) 21 mcg (0.03 %) nasal spray; 2 sprays by Each Nostril route 3 (three) times daily.  Dispense: 30 mL; Refill: 0        Patient Instructions   INSTRUCTIONS:  - Rest.  - Drink plenty of fluids.  - Take Tylenol and/or Ibuprofen as directed as needed for fever/pain.  Do not take more than the recommended dose.  - follow up with your PCP within the next 1-2 weeks as needed.  - You must understand that you have received an Urgent Care treatment only and that you may be released before all of your medical problems are known or treated.   - You, the patient, will arrange for follow up care as instructed.   - If your condition worsens or fails to improve we recommend that you receive another evaluation at the ER immediately or  contact your PCP to discuss your concerns.   - You can call [phone] or [phone] to help schedule an appointment with the appropriate provider.     -If you smoke cigarettes, it would be beneficial for you to stop.

## 2024-08-01 ENCOUNTER — TELEPHONE (OUTPATIENT)
Dept: URGENT CARE | Facility: CLINIC | Age: 74
End: 2024-08-01
Payer: MEDICARE

## 2024-08-01 NOTE — TELEPHONE ENCOUNTER
Patient requested a medication and reports her symptoms have worsened. Advised patient to return to clinic for re-evaluation

## 2024-08-20 ENCOUNTER — HOSPITAL ENCOUNTER (OUTPATIENT)
Dept: RADIOLOGY | Facility: HOSPITAL | Age: 74
Discharge: HOME | End: 2024-08-20
Attending: FAMILY MEDICINE
Payer: MEDICARE

## 2024-08-20 DIAGNOSIS — Z78.0 POST-MENOPAUSAL: ICD-10-CM

## 2024-08-20 PROCEDURE — 77080 DXA BONE DENSITY AXIAL: CPT | Mod: 26,,, | Performed by: RADIOLOGY

## 2024-08-20 PROCEDURE — 77080 DXA BONE DENSITY AXIAL: CPT | Mod: TC,PO

## 2024-08-20 PROCEDURE — 77091 TBS TECHL CALCULATION ONLY: CPT | Mod: PO

## 2024-08-20 PROCEDURE — 77092 TBS I&R FX RSK QHP: CPT | Mod: ,,, | Performed by: RADIOLOGY

## 2024-08-27 ENCOUNTER — TELEPHONE (OUTPATIENT)
Dept: BARIATRICS | Facility: CLINIC | Age: 74
End: 2024-08-27
Payer: MEDICARE

## 2024-08-27 NOTE — TELEPHONE ENCOUNTER
Called pt to reschedule appointment with KAREEM Gardner NP due to provider family emergency. No answer, LVM with call back number.

## 2024-09-24 ENCOUNTER — LAB VISIT (OUTPATIENT)
Dept: LAB | Facility: HOSPITAL | Age: 74
End: 2024-09-24
Attending: INTERNAL MEDICINE
Payer: MEDICARE

## 2024-09-24 DIAGNOSIS — Z85.09 HISTORY OF GASTROINTESTINAL STROMAL TUMOR (GIST): ICD-10-CM

## 2024-09-24 LAB
ALBUMIN SERPL BCP-MCNC: 3.8 G/DL (ref 3.5–5.2)
ALP SERPL-CCNC: 82 U/L (ref 55–135)
ALT SERPL W/O P-5'-P-CCNC: 18 U/L (ref 10–44)
ANION GAP SERPL CALC-SCNC: 10 MMOL/L (ref 8–16)
AST SERPL-CCNC: 16 U/L (ref 10–40)
BASOPHILS # BLD AUTO: 0.08 K/UL (ref 0–0.2)
BASOPHILS NFR BLD: 1.1 % (ref 0–1.9)
BILIRUB SERPL-MCNC: 1 MG/DL (ref 0.1–1)
BUN SERPL-MCNC: 20 MG/DL (ref 8–23)
CALCIUM SERPL-MCNC: 9.4 MG/DL (ref 8.7–10.5)
CHLORIDE SERPL-SCNC: 113 MMOL/L (ref 95–110)
CO2 SERPL-SCNC: 22 MMOL/L (ref 23–29)
CREAT SERPL-MCNC: 1 MG/DL (ref 0.5–1.4)
DIFFERENTIAL METHOD BLD: NORMAL
EOSINOPHIL # BLD AUTO: 0.2 K/UL (ref 0–0.5)
EOSINOPHIL NFR BLD: 2 % (ref 0–8)
ERYTHROCYTE [DISTWIDTH] IN BLOOD BY AUTOMATED COUNT: 13 % (ref 11.5–14.5)
EST. GFR  (NO RACE VARIABLE): 60 ML/MIN/1.73 M^2
GLUCOSE SERPL-MCNC: 97 MG/DL (ref 70–110)
HCT VFR BLD AUTO: 40.1 % (ref 37–48.5)
HGB BLD-MCNC: 13.4 G/DL (ref 12–16)
IMM GRANULOCYTES # BLD AUTO: 0.02 K/UL (ref 0–0.04)
IMM GRANULOCYTES NFR BLD AUTO: 0.3 % (ref 0–0.5)
LYMPHOCYTES # BLD AUTO: 2.7 K/UL (ref 1–4.8)
LYMPHOCYTES NFR BLD: 35.6 % (ref 18–48)
MCH RBC QN AUTO: 30.8 PG (ref 27–31)
MCHC RBC AUTO-ENTMCNC: 33.4 G/DL (ref 32–36)
MCV RBC AUTO: 92 FL (ref 82–98)
MONOCYTES # BLD AUTO: 0.6 K/UL (ref 0.3–1)
MONOCYTES NFR BLD: 8.2 % (ref 4–15)
NEUTROPHILS # BLD AUTO: 4 K/UL (ref 1.8–7.7)
NEUTROPHILS NFR BLD: 52.8 % (ref 38–73)
NRBC BLD-RTO: 0 /100 WBC
PLATELET # BLD AUTO: 275 K/UL (ref 150–450)
PMV BLD AUTO: 10.4 FL (ref 9.2–12.9)
POTASSIUM SERPL-SCNC: 4.8 MMOL/L (ref 3.5–5.1)
PROT SERPL-MCNC: 7.2 G/DL (ref 6–8.4)
RBC # BLD AUTO: 4.35 M/UL (ref 4–5.4)
SODIUM SERPL-SCNC: 145 MMOL/L (ref 136–145)
WBC # BLD AUTO: 7.56 K/UL (ref 3.9–12.7)

## 2024-09-24 PROCEDURE — 36415 COLL VENOUS BLD VENIPUNCTURE: CPT | Mod: PO | Performed by: INTERNAL MEDICINE

## 2024-09-24 PROCEDURE — 85025 COMPLETE CBC W/AUTO DIFF WBC: CPT | Mod: PO | Performed by: INTERNAL MEDICINE

## 2024-09-24 PROCEDURE — 80053 COMPREHEN METABOLIC PANEL: CPT | Mod: PO | Performed by: INTERNAL MEDICINE

## 2024-09-25 ENCOUNTER — HOSPITAL ENCOUNTER (OUTPATIENT)
Dept: RADIOLOGY | Facility: HOSPITAL | Age: 74
Discharge: HOME | End: 2024-09-25
Attending: INTERNAL MEDICINE
Payer: MEDICARE

## 2024-09-25 DIAGNOSIS — Z85.09 HISTORY OF GASTROINTESTINAL STROMAL TUMOR (GIST): ICD-10-CM

## 2024-09-25 PROCEDURE — A9585 GADOBUTROL INJECTION: HCPCS | Mod: PO | Performed by: INTERNAL MEDICINE

## 2024-09-25 PROCEDURE — 74183 MRI ABD W/O CNTR FLWD CNTR: CPT | Mod: TC,PO

## 2024-09-25 PROCEDURE — 25500020 PHARM REV CODE 255: Mod: PO | Performed by: INTERNAL MEDICINE

## 2024-09-25 RX ORDER — GADOBUTROL 604.72 MG/ML
9 INJECTION INTRAVENOUS
Status: COMPLETED | OUTPATIENT
Start: 2024-09-25 | End: 2024-09-25

## 2024-09-25 RX ADMIN — GADOBUTROL 9 ML: 604.72 INJECTION INTRAVENOUS at 10:09

## 2024-09-26 NOTE — PROGRESS NOTES
PATIENT: Nancy Figueroa  MRN: [id number]  DATE: 9/29/2024      Diagnosis:   1. History of gastrointestinal stromal tumor (GIST)    2. Pulmonary nodules              Chief Complaint: History of gastrointestinal stromal tumor (GIST)      Oncologic History:      Oncologic History     Oncologic Treatment     Pathology           Subjective:    Interval History: Ms. Figueroa is a 74 y.o. female with CAD, HTN, GERD, Hypothyroidism, who presents for GIST.  Since clinic visit the patient underwent CTA chest on 08/01/2024 showed a stable ground-glass nodule in the right upper lobe; stable pleural-based 2 mm pulmonary nodule on the right.  MRI abdomen on 09/25/2024 showed a stable postsurgical changes from prior partial gastrectomy; stable 1.3 cm binocular right hepatic lobe cyst.    The patient denies CP, cough, SOB, abdominal pain, nausea, vomiting, constipation, diarrhea.  The patient denies fever, chills, night sweats, weight loss, new lumps or bumps, easy bruising or bleeding.    Prior History:  The patient was initially undergoing a gastric sleeve with a portion of the stomach resected on 1/27/20.  A GIST tumor was found in the stomach measuring 0.4x0.4x0.1cm, mitotic rate of 0/5mm2, low grade, 0.8cm from closest margin. [id number]. Stage 1A.  Pt was initially seen by oncologist Dr Waite 2/24/20 whom ordered a Ct C/A/P on 3/13/20 showing two sub 5mm nodules in the left and right lung.  The patient then underwent surveillance scans.  PET/CT 8/18/21 showed a tiny pulmonary nodul.  Most recent CT chest 7/25/23 showed a stable 4 mm ground-glass nodule in the anterior inferior right upper lobe; stable 2 mm oval pleural-based nodule smooth margins in the left lower lobe; stable 2 mm juxtapleural nodule in the right upper lobe.   The patient underwent MRI of the abdomen on 09/25/2023 showing no evidence of recurrent or metastatic disease.  Patient underwent EGD on 02/01/2024 showing small hiatal hernia, gastritis, and evidence of  a sleeve gastrectomy.   The patient underwent MRI abdomen on 3/15/24 showing a stable 1.3 cm cyst in the medial inferior right hepatic lobe.    Past Medical History:   Past Medical History:   Diagnosis Date    Atherosclerotic heart disease of native coronary artery without angina pectoris 5/1/2017 4/17  Left main: normal  LAD: two diagonals  Less than 50%.  90% mid LAD Circumflex:  minimal disease, less than 20%. --  RCA: The vessel was large sized (dominant) with less than 20% stenosis.    Cancer     GIST stomach    Cataract     OU    Essential hypertension 10/18/2012    GERD (gastroesophageal reflux disease)     HTN (hypertension)     Hypothyroidism     NSTEMI (non-ST elevated myocardial infarction) 4/24/2017 4/2017    Stented coronary artery 7/26/2017 4/17 LAD-Synergy 3.0 x 28        Past Surgical HIstory:   Past Surgical History:   Procedure Laterality Date    APPENDECTOMY      BREAST CYST ASPIRATION      CARDIAC CATHETERIZATION  2017    LAD stent     CATARACT EXTRACTION Bilateral     per pt     COLONOSCOPY      COLONOSCOPY N/A 01/25/2022    Procedure: COLONOSCOPY;  Surgeon: Tanner Ron MD;  Location: [facility];  Service: Endoscopy;  Laterality: N/A;    ESOPHAGOGASTRODUODENOSCOPY N/A 11/18/2021    Procedure: EGD (ESOPHAGOGASTRODUODENOSCOPY);  Surgeon: Tanner Ron MD;  Location: [facility];  Service: Endoscopy;  Laterality: N/A;    ESOPHAGOGASTRODUODENOSCOPY N/A 2/1/2024    Procedure: ESOPHAGOGASTRODUODENOSCOPY (EGD);  Surgeon: Jose Luis Chong MD;  Location: [facility];  Service: Endoscopy;  Laterality: N/A;    HYSTERECTOMY      YANG with BSO    LAPAROSCOPIC SLEEVE GASTRECTOMY N/A 01/27/2020    Procedure: GASTRECTOMY, SLEEVE, LAPAROSCOPIC;  Surgeon: Cirilo Mathias MD;  Location: [facility];  Service: General;  Laterality: N/A;    OOPHORECTOMY         Family History:   Family History   Problem Relation Name Age of Onset    Cancer Mother  80        breast cancer    Dementia Mother       Cataracts Mother      Breast cancer Mother  70    Lung cancer Father      Cancer Father          lung cancer    Cataracts Father      Obesity Sister      Obesity Sister      Obesity Sister      Breast cancer Paternal Aunt      Cancer Paternal Grandmother          colon       Social History:  reports that she has never smoked. She has never used smokeless tobacco. She reports that she does not drink alcohol and does not use drugs.    Allergies:  Review of patient's allergies indicates:  No Known Allergies    Medications:  Current Outpatient Medications   Medication Sig Dispense Refill    amLODIPine (NORVASC) 5 MG tablet TAKE 1 TABLET(5 MG) BY MOUTH EVERY DAY 90 tablet 0    aspirin (ECOTRIN) 81 MG EC tablet Take 81 mg by mouth every evening. HOLD FOR 1 WEEK PRIOR TO SURGERY      atorvastatin (LIPITOR) 10 MG tablet Take 1 tablet (10 mg total) by mouth once daily. 90 tablet 3    carvediloL (COREG) 6.25 MG tablet TAKE 1 TABLET(6.25 MG) BY MOUTH TWICE DAILY 180 tablet 4    clopidogreL (PLAVIX) 75 mg tablet TAKE 1 TABLET(75 MG) BY MOUTH EVERY DAY 90 tablet 4    cyanocobalamin 500 MCG tablet Take 500 mcg by mouth once daily.      ipratropium (ATROVENT) 21 mcg (0.03 %) nasal spray 2 sprays by Each Nostril route 3 (three) times daily. 30 mL 0    levothyroxine (SYNTHROID) 112 MCG tablet TAKE 1 TABLET(112 MCG) BY MOUTH BEFORE BREAKFAST 90 tablet 0    multivitamin (THERAGRAN) per tablet Take 1 tablet by mouth once daily.      pantoprazole (PROTONIX) 40 MG tablet Take 1 tablet (40 mg total) by mouth once daily. 90 tablet 2    promethazine (PHENERGAN) 6.25 mg/5 mL syrup Take 10 mLs at night as needed. 120 mL 1    topiramate (TOPAMAX) 25 MG tablet TAKE 1 TABLET(25 MG) BY MOUTH TWICE DAILY 180 tablet 0     No current facility-administered medications for this visit.       Review of Systems   Constitutional:  Negative for chills, fatigue, fever and unexpected weight change.   Respiratory:  Negative for cough and shortness of breath.   "  Cardiovascular:  Negative for chest pain and palpitations.   Gastrointestinal:  Negative for abdominal pain, constipation, diarrhea, nausea and vomiting.   Skin:  Negative for rash.   Neurological:  Negative for headaches.   Hematological:  Negative for adenopathy. Does not bruise/bleed easily.       ECOG Performance Status: 0   Objective:      Vitals:   Vitals:    09/27/24 1259   BP: 138/84   BP Location: Right arm   Patient Position: Sitting   BP Method: Large (Automatic)   Pulse: 68   Resp: 16   Temp: 96.9 °F (36.1 °C)   TempSrc: Temporal   SpO2: 98%   Weight: 92.2 kg (203 lb 4.2 oz)   Height: 5' 3.5" (1.613 m)             Physical Exam  Constitutional:       General: She is not in acute distress.     Appearance: She is well-developed. She is not diaphoretic.   HENT:      Head: Normocephalic and atraumatic.   Cardiovascular:      Rate and Rhythm: Normal rate and regular rhythm.      Heart sounds: Normal heart sounds. No murmur heard.     No friction rub. No gallop.   Pulmonary:      Effort: Pulmonary effort is normal. No respiratory distress.      Breath sounds: Normal breath sounds. No wheezing or rales.   Chest:      Chest wall: No tenderness.   Abdominal:      General: Bowel sounds are normal. There is no distension.      Palpations: Abdomen is soft. There is no mass.      Tenderness: There is no abdominal tenderness. There is no guarding or rebound.   Lymphadenopathy:      Cervical: No cervical adenopathy.      Upper Body:      Right upper body: No supraclavicular or axillary adenopathy.      Left upper body: No supraclavicular or axillary adenopathy.   Skin:     Findings: No erythema or rash.   Neurological:      Mental Status: She is alert and oriented to person, place, and time.   Psychiatric:         Behavior: Behavior normal.         Laboratory Data:  Lab Visit on 09/24/2024   Component Date Value Ref Range Status    WBC 09/24/2024 7.56  3.90 - 12.70 K/uL Final    RBC 09/24/2024 4.35  4.00 - 5.40 M/uL " Final    Hemoglobin 09/24/2024 13.4  12.0 - 16.0 g/dL Final    Hematocrit 09/24/2024 40.1  37.0 - 48.5 % Final    MCV 09/24/2024 92  82 - 98 fL Final    MCH 09/24/2024 30.8  27.0 - 31.0 pg Final    MCHC 09/24/2024 33.4  32.0 - 36.0 g/dL Final    RDW 09/24/2024 13.0  11.5 - 14.5 % Final    Platelets 09/24/2024 275  150 - 450 K/uL Final    MPV 09/24/2024 10.4  9.2 - 12.9 fL Final    Immature Granulocytes 09/24/2024 0.3  0.0 - 0.5 % Final    Gran # (ANC) 09/24/2024 4.0  1.8 - 7.7 K/uL Final    Immature Grans (Abs) 09/24/2024 0.02  0.00 - 0.04 K/uL Final    Comment: Mild elevation in immature granulocytes is non specific and   can be seen in a variety of conditions including stress response,   acute inflammation, trauma and pregnancy. Correlation with other   laboratory and clinical findings is essential.      Lymph # 09/24/2024 2.7  1.0 - 4.8 K/uL Final    Mono # 09/24/2024 0.6  0.3 - 1.0 K/uL Final    Eos # 09/24/2024 0.2  0.0 - 0.5 K/uL Final    Baso # 09/24/2024 0.08  0.00 - 0.20 K/uL Final    nRBC 09/24/2024 0  0 /100 WBC Final    Gran % 09/24/2024 52.8  38.0 - 73.0 % Final    Lymph % 09/24/2024 35.6  18.0 - 48.0 % Final    Mono % 09/24/2024 8.2  4.0 - 15.0 % Final    Eosinophil % 09/24/2024 2.0  0.0 - 8.0 % Final    Basophil % 09/24/2024 1.1  0.0 - 1.9 % Final    Differential Method 09/24/2024 Automated   Final    Sodium 09/24/2024 145  136 - 145 mmol/L Final    Potassium 09/24/2024 4.8  3.5 - 5.1 mmol/L Final    Chloride 09/24/2024 113 (H)  95 - 110 mmol/L Final    CO2 09/24/2024 22 (L)  23 - 29 mmol/L Final    Glucose 09/24/2024 97  70 - 110 mg/dL Final    BUN 09/24/2024 20  8 - 23 mg/dL Final    Creatinine 09/24/2024 1.0  0.5 - 1.4 mg/dL Final    Calcium 09/24/2024 9.4  8.7 - 10.5 mg/dL Final    Total Protein 09/24/2024 7.2  6.0 - 8.4 g/dL Final    Albumin 09/24/2024 3.8  3.5 - 5.2 g/dL Final    Total Bilirubin 09/24/2024 1.0  0.1 - 1.0 mg/dL Final    Comment: For infants and newborns, interpretation of results  should be based  on gestational age, weight and in agreement with clinical  observations.    Premature Infant recommended reference ranges:  Up to 24 hours.............<8.0 mg/dL  Up to 48 hours............<12.0 mg/dL  3-5 days..................<15.0 mg/dL  6-29 days.................<15.0 mg/dL      Alkaline Phosphatase 09/24/2024 82  55 - 135 U/L Final    AST 09/24/2024 16  10 - 40 U/L Final    ALT 09/24/2024 18  10 - 44 U/L Final    eGFR 09/24/2024 60  >60 mL/min/1.73 m^2 Final    Anion Gap 09/24/2024 10  8 - 16 mmol/L Final         Imaging:     MRI Abdomen  9/25/24  Lower thorax: Stable postsurgical changes of prior partial gastrectomy with persistent large hiatal hernia, similar to prior exam. No pleural effusion.     Liver: Liver measures 17 cm. Normal homogeneous background signal. Stable 1.3 cm bilocular right hepatic lobe cyst. Hepatic and portal veins are patent.     Biliary: Gallbladder is normal. No intrahepatic or extrahepatic biliary dilatation.     Pancreas: Normal. No pancreatic ductal dilatation.     Spleen: Normal size. No focal lesions.     Adrenal glands: Normal.     Kidneys: Stable bilateral subcentimeter renal cortical cysts. No renal masses. No hydronephrosis.     Miscellaneous: Visualized bowel loops are normal. No evidence for lymphadenopathy.      CTA Chest 8/01/24  No evidence of pulmonary embolism is identified on either side.     The visualized portions of the thyroid lobes appear unremarkable. No supraclavicular, axillary, mediastinal or hilar adenopathy identified. The aorta is normal in caliber. The heart size is within normal limits. Dense coronary vascular calcifications are identified. No pericardial effusion is seen.     The lung fields demonstrate tiny ground-glass nodule in the right upper lobe, unchanged in comparison to the prior study (series 6, image 104). Pleural based 2 mm nodule laterally in the right upper lobe is unchanged (series 6, image 98. No new nodules are seen. No  airspace consolidations are identified. No endobronchial lesions are noted. No pleural effusion or pneumothorax are seen.     The visualized portions of the upper abdomen demonstrate a large hiatal hernia. Postoperative changes identified in the stomach. The adrenal glands appear unremarkable.     Review of the bony structures demonstrates no acute abnormalities.       Assessment:       1. History of gastrointestinal stromal tumor (GIST)    2. Pulmonary nodules                 Plan:     GIST - Pt with a stage IA GIST found incidentally during partial gastrectomy for gastric sleeve formation 1/27/20  -Tumor measured 0.4x0.4x0.1cm, mitotic rate of 0/5mm2, low grade, 0.8cm from closest margin. [id number]  -PT with metastasis risk of 0%  -MRI Abdomen 9/25/23, 3/15/24, and 9/25/24 showed JESS  -Will repeat MRI abdomen in 6 months    Pulmonary Nodules - Pt with stable pulmonary nodules on CT chest 7/25/23 and 8/01/24  -Pt seen by Dr Hollingsworth on 4/29/24 and felt pt did not need additional surveillance for pulmonary nodules    Route Chart for Scheduling    Med Onc Chart Routing      Follow up with physician 6 months. Pt needs a CBC, CMP and MRI abdomen in 6 months with return visit.   Follow up with [facility]    Infusion scheduling note    Injection scheduling note    Labs    Imaging    Pharmacy appointment    Other referrals                   Devonte Morales MD  [facility]  Hematology and Oncology  [facility]   [address] [place] [address]   O: [phone]  F: [phone]

## 2024-09-27 ENCOUNTER — OFFICE VISIT (OUTPATIENT)
Dept: HEMATOLOGY/ONCOLOGY | Facility: CLINIC | Age: 74
End: 2024-09-27
Payer: MEDICARE

## 2024-09-27 VITALS
SYSTOLIC BLOOD PRESSURE: 138 MMHG | RESPIRATION RATE: 16 BRPM | TEMPERATURE: 97 F | HEIGHT: 64 IN | WEIGHT: 203.25 LBS | DIASTOLIC BLOOD PRESSURE: 84 MMHG | BODY MASS INDEX: 34.7 KG/M2 | HEART RATE: 68 BPM | OXYGEN SATURATION: 98 %

## 2024-09-27 DIAGNOSIS — R91.8 PULMONARY NODULES: ICD-10-CM

## 2024-09-27 DIAGNOSIS — Z85.09 HISTORY OF GASTROINTESTINAL STROMAL TUMOR (GIST): Primary | ICD-10-CM

## 2024-09-27 PROCEDURE — 99999 PR PBB SHADOW E&M-EST. PATIENT-LVL IV: CPT | Mod: PBBFAC,,, | Performed by: INTERNAL MEDICINE

## 2024-09-27 PROCEDURE — 99213 OFFICE O/P EST LOW 20 MIN: CPT | Mod: S$PBB,,, | Performed by: INTERNAL MEDICINE

## 2024-09-27 PROCEDURE — 99214 OFFICE O/P EST MOD 30 MIN: CPT | Mod: PBBFAC,PN | Performed by: INTERNAL MEDICINE

## 2024-09-29 DIAGNOSIS — E03.9 HYPOTHYROIDISM, UNSPECIFIED TYPE: ICD-10-CM

## 2024-09-29 DIAGNOSIS — I10 ESSENTIAL HYPERTENSION: ICD-10-CM

## 2024-09-29 DIAGNOSIS — E66.01 MORBID OBESITY: ICD-10-CM

## 2024-09-29 NOTE — TELEPHONE ENCOUNTER
No care due was identified.  [facility] Embedded Care Due Messages. Reference number: [id number].   9/29/2024 4:48:52 PM CDT

## 2024-09-30 RX ORDER — AMLODIPINE BESYLATE 5 MG/1
5 TABLET ORAL
Qty: 90 TABLET | Refills: 3 | Status: SHIPPED | OUTPATIENT
Start: 2024-09-30

## 2024-09-30 RX ORDER — TOPIRAMATE 25 MG/1
25 TABLET ORAL 2 TIMES DAILY
Qty: 180 TABLET | Refills: 0 | Status: SHIPPED | OUTPATIENT
Start: 2024-09-30

## 2024-09-30 RX ORDER — LEVOTHYROXINE SODIUM 112 UG/1
TABLET ORAL
Qty: 90 TABLET | Refills: 3 | Status: SHIPPED | OUTPATIENT
Start: 2024-09-30

## 2024-09-30 NOTE — TELEPHONE ENCOUNTER
Refill Decision Note   Nancy Carolina  is requesting a refill authorization.  Brief Assessment and Rationale for Refill:  Approve     Medication Therapy Plan:  no medication changes per ED visit    Medication Reconciliation Completed: No   Comments:     No Care Gaps recommended.     Note composed:11:18 AM 09/30/2024

## 2024-10-11 ENCOUNTER — OFFICE VISIT (OUTPATIENT)
Dept: BARIATRICS | Facility: CLINIC | Age: 74
End: 2024-10-11
Payer: MEDICARE

## 2024-10-11 VITALS
HEIGHT: 64 IN | HEART RATE: 66 BPM | SYSTOLIC BLOOD PRESSURE: 131 MMHG | TEMPERATURE: 97 F | WEIGHT: 200.63 LBS | DIASTOLIC BLOOD PRESSURE: 75 MMHG | BODY MASS INDEX: 34.25 KG/M2 | RESPIRATION RATE: 16 BRPM

## 2024-10-11 DIAGNOSIS — Z90.3 HISTORY OF SLEEVE GASTRECTOMY: ICD-10-CM

## 2024-10-11 DIAGNOSIS — E66.9 OBESITY (BMI 30-39.9): Primary | ICD-10-CM

## 2024-10-11 PROCEDURE — 99214 OFFICE O/P EST MOD 30 MIN: CPT | Mod: PBBFAC,PO | Performed by: NURSE PRACTITIONER

## 2024-10-11 PROCEDURE — 99999 PR PBB SHADOW E&M-EST. PATIENT-LVL IV: CPT | Mod: PBBFAC,,, | Performed by: NURSE PRACTITIONER

## 2024-10-11 RX ORDER — TOPIRAMATE 50 MG/1
50 TABLET, FILM COATED ORAL 2 TIMES DAILY
Qty: 60 TABLET | Refills: 11 | Status: SHIPPED | OUTPATIENT
Start: 2024-10-11 | End: 2025-10-11

## 2024-10-11 NOTE — PROGRESS NOTES
Bariatric Surgery Post Op Note    Surgery: gastric sleeve surgery with hiatal hernia repair    Pathology- [facility]  Date: 1/27/20  Initial weight: 262  Last weight: 213  Current weight: 200   Goal: 180 lb    Wt Readings from Last 10 Encounters:   10/11/24 91 kg (200 lb 9.6 oz)   09/27/24 92.2 kg (203 lb 4.2 oz)   08/01/24 93 kg (205 lb 0.4 oz)   07/29/24 94.3 kg (208 lb)   07/17/24 94.6 kg (208 lb 8.9 oz)   04/29/24 97.1 kg (214 lb)   03/22/24 98.7 kg (217 lb 9.5 oz)   03/15/24 96.6 kg (213 lb)   02/23/24 100.2 kg (220 lb 14.4 oz)   01/29/24 101.6 kg (224 lb)         Comorbidities:   Past Medical History:   Diagnosis Date    Atherosclerotic heart disease of native coronary artery without angina pectoris 5/1/2017 4/17  Left main: normal  LAD: two diagonals  Less than 50%.  90% mid LAD Circumflex:  minimal disease, less than 20%. --  RCA: The vessel was large sized (dominant) with less than 20% stenosis.    Cancer     GIST stomach    Cataract     OU    Essential hypertension 10/18/2012    GERD (gastroesophageal reflux disease)     HTN (hypertension)     Hypothyroidism     NSTEMI (non-ST elevated myocardial infarction) 4/24/2017 4/2017    Stented coronary artery 7/26/2017 4/17 LAD-Synergy 3.0 x 28      Past Surgical History:   Procedure Laterality Date    APPENDECTOMY      BREAST CYST ASPIRATION      CARDIAC CATHETERIZATION  2017    LAD stent     CATARACT EXTRACTION Bilateral     per pt     COLONOSCOPY      COLONOSCOPY N/A 01/25/2022    Procedure: COLONOSCOPY;  Surgeon: Tanner Ron MD;  Location: [facility];  Service: Endoscopy;  Laterality: N/A;    ESOPHAGOGASTRODUODENOSCOPY N/A 11/18/2021    Procedure: EGD (ESOPHAGOGASTRODUODENOSCOPY);  Surgeon: Tanner Ron MD;  [facility];  Service: Endoscopy;  Laterality: N/A;    ESOPHAGOGASTRODUODENOSCOPY N/A 2/1/2024    Procedure: ESOPHAGOGASTRODUODENOSCOPY (EGD);  Surgeon: Jose Luis Chong MD;  [facility];  Service: Endoscopy;  Laterality:  N/A;    HYSTERECTOMY      YANG with BSO    LAPAROSCOPIC SLEEVE GASTRECTOMY N/A 01/27/2020    Procedure: GASTRECTOMY, SLEEVE, LAPAROSCOPIC;  Surgeon: Cirilo Mathias MD;  Location: [facility];  Service: General;  Laterality: N/A;    OOPHORECTOMY       Family History   Problem Relation Name Age of Onset    Cancer Mother  80        breast cancer    Dementia Mother      Cataracts Mother      Breast cancer Mother  70    Lung cancer Father      Cancer Father          lung cancer    Cataracts Father      Obesity Sister      Obesity Sister      Obesity Sister      Breast cancer Paternal Aunt      Cancer Paternal Grandmother          colon     Social History     Tobacco Use    Smoking status: Never    Smokeless tobacco: Never   Substance Use Topics    Alcohol use: No    Drug use: No          Medications:  Current Outpatient Medications on File Prior to Visit   Medication Sig Dispense Refill    amLODIPine (NORVASC) 5 MG tablet TAKE 1 TABLET(5 MG) BY MOUTH EVERY DAY 90 tablet 3    aspirin (ECOTRIN) 81 MG EC tablet Take 81 mg by mouth every evening. HOLD FOR 1 WEEK PRIOR TO SURGERY      atorvastatin (LIPITOR) 10 MG tablet Take 1 tablet (10 mg total) by mouth once daily. 90 tablet 3    carvediloL (COREG) 6.25 MG tablet TAKE 1 TABLET(6.25 MG) BY MOUTH TWICE DAILY 180 tablet 4    clopidogreL (PLAVIX) 75 mg tablet TAKE 1 TABLET(75 MG) BY MOUTH EVERY DAY 90 tablet 4    cyanocobalamin 500 MCG tablet Take 500 mcg by mouth once daily.      ipratropium (ATROVENT) 21 mcg (0.03 %) nasal spray 2 sprays by Each Nostril route 3 (three) times daily. 30 mL 0    levothyroxine (SYNTHROID) 112 MCG tablet TAKE 1 TABLET(112 MCG) BY MOUTH BEFORE BREAKFAST 90 tablet 3    multivitamin (THERAGRAN) per tablet Take 1 tablet by mouth once daily.      pantoprazole (PROTONIX) 40 MG tablet Take 1 tablet (40 mg total) by mouth once daily. 90 tablet 2    [DISCONTINUED] topiramate (TOPAMAX) 25 MG tablet TAKE 1 TABLET(25 MG) BY MOUTH TWICE DAILY 180 tablet 0     "promethazine (PHENERGAN) 6.25 mg/5 mL syrup Take 10 mLs at night as needed. (Patient not taking: Reported on 10/11/2024) 120 mL 1     No current facility-administered medications on file prior to visit.         Labs: individually reviewed and interpreted.  Most Recent Data:  CBC:   Lab Results   Component Value Date    WBC 7.56 09/24/2024    HGB 13.4 09/24/2024    HCT 40.1 09/24/2024     09/24/2024    MCV 92 09/24/2024    RDW 13.0 09/24/2024     BMP:   Lab Results   Component Value Date     09/24/2024    K 4.8 09/24/2024     (H) 09/24/2024    CO2 22 (L) 09/24/2024    BUN 20 09/24/2024    CREATININE 1.0 09/24/2024    GLU 97 09/24/2024    CALCIUM 9.4 09/24/2024    MG 2.1 08/01/2024    PHOS 3.0 01/28/2020     LFTs:   Lab Results   Component Value Date    PROT 7.2 09/24/2024    ALBUMIN 3.8 09/24/2024    BILITOT 1.0 09/24/2024    AST 16 09/24/2024    ALKPHOS 82 09/24/2024    ALT 18 09/24/2024     Coags: No results found for: "INR", "PROTIME", "PTT"  FLP:   Lab Results   Component Value Date    CHOL 146 07/25/2023    HDL 64 07/25/2023    LDLCALC 59.6 (L) 07/25/2023    TRIG 112 07/25/2023    CHOLHDL 43.8 07/25/2023     DM:   Lab Results   Component Value Date    HGBA1C 5.4 01/21/2020    HGBA1C 5.6 04/23/2017    LDLCALC 59.6 (L) 07/25/2023    CREATININE 1.0 09/24/2024     Thyroid:   Lab Results   Component Value Date    TSH 2.915 07/17/2024    FREET4 1.11 07/17/2024     Anemia:   Lab Results   Component Value Date    IRON 76 03/19/2021    TIBC 454 (H) 03/19/2021    ERNTGYYH79 498 03/19/2021     Cardiac:   Lab Results   Component Value Date    TROPONINI <0.012 08/01/2024     Urinalysis:   Lab Results   Component Value Date    COLORU Yellow 08/27/2015    SPECGRAV 1.015 08/27/2015    NITRITE Negative 08/27/2015    KETONESU Negative 08/27/2015             Review of Systems:  Review of Systems   Constitutional:  Negative for unexpected weight change.   Gastrointestinal:  Negative for abdominal pain and nausea. "   All other systems reviewed and are negative.      Constipation: none  Reflux: intermittent, with large bites, at night  Vomiting: none      Body comp:  Fat Percent:  48.6%  Fat Mass:  97.4 lb  FFM:  103.4 lb  TBW: 73 lb  TBW %:  36.5 %  BMR: 1461 kcal      Wt Readings from Last 10 Encounters:   10/11/24 91 kg (200 lb 9.6 oz)   09/27/24 92.2 kg (203 lb 4.2 oz)   08/01/24 93 kg (205 lb 0.4 oz)   07/29/24 94.3 kg (208 lb)   07/17/24 94.6 kg (208 lb 8.9 oz)   04/29/24 97.1 kg (214 lb)   03/22/24 98.7 kg (217 lb 9.5 oz)   03/15/24 96.6 kg (213 lb)   02/23/24 100.2 kg (220 lb 14.4 oz)   01/29/24 101.6 kg (224 lb)         Diet:  Breakfast: 2 eggs fat free cheese- banana whole wheat toast (Killer [employer])  Occasionally oatmeal (old fashion)   1.5 cups Coffee with half& half  Lunch: sandwich on whole wheat bread, turkey, lettuce  Dinner: eggs, fish.   Snack: yogurt (Fat Free/sugar free) with blueberries, dragon fruit, cantaloupe, banana 2-3x/wk  Daily protein shake  Water: 40+ oz per day  Limited sweets      Exercise:  Working in yard, 10 acres,- cutting edges with machine    MVI:  Jitendra brand twice per day with iron        Physical Exam:  Vitals:    10/11/24 1013   BP: 131/75   Pulse: 66   Resp: 16   Temp: 97.4 °F (36.3 °C)       Physical Exam  Vitals and nursing note reviewed.   Constitutional:       General: She is not in acute distress.     Appearance: Normal appearance. She is not ill-appearing or toxic-appearing.   HENT:      Head: Normocephalic and atraumatic.      Right Ear: External ear normal.      Left Ear: External ear normal.      Nose: Nose normal. No congestion or rhinorrhea.      Mouth/Throat:      Mouth: Mucous membranes are moist.      Pharynx: Oropharynx is clear.   Eyes:      General:         Right eye: No discharge.         Left eye: No discharge.      Conjunctiva/sclera: Conjunctivae normal.   Cardiovascular:      Rate and Rhythm: Normal rate.      Pulses: Normal pulses.   Pulmonary:      Effort:  Pulmonary effort is normal.   Musculoskeletal:         General: No swelling, tenderness, deformity or signs of injury. Normal range of motion.      Right lower leg: No edema.      Left lower leg: No edema.   Skin:     General: Skin is warm and dry.      Capillary Refill: Capillary refill takes less than 2 seconds.      Coloration: Skin is not jaundiced or pale.      Findings: No bruising, erythema or rash.   Neurological:      General: No focal deficit present.      Mental Status: She is alert and oriented to person, place, and time.      Motor: No weakness.      Gait: Gait normal.   Psychiatric:         Mood and Affect: Mood normal.         Behavior: Behavior normal.         Thought Content: Thought content normal.         Judgment: Judgment normal.               A/P: s/p Sleeve with Hernia repair    Hiatal hernia- recurred on last CT scan 7/17/2023- follow up endoscopy - appears to have intermittent GERD,   2021 EGD without Hiatal hernia noted,    2/2024 EGD without hiatal hernia noted     Counseling/Plan for patient:  Falls reviewed with patient  Continue prescribed medications  No side effects from topamax will continue and increase dose  Tanita scale results reviewed with patient  Fat decreased 12 lbs  Muscle (FFM)- no change  Water decreased 1 lb  Diet adherence  Ensure  gm of protein at minimum/day- attempt 80 gm  Do not skip meals  Limits carbs- discussed changing to low calorie breads  Increasing protein with options provided  Exercise: ok for cardio and heavy lifting  Add ankle weights to normal routine   Vitamins: 2 mvi daily, calcium, and b complex  Continue multivitamins- discussion of post-operative life long MVI need, including Calcium, and a B-Complex  Will monitor with ABRIL protein and calcium intake  0049-3084 mg calcium per day  GIST- continue to f/u with oncology  MRI completed in April- no changes within abd  Did review with patient on moderate size Hiatal Hernia- pt without symptoms  EGD  completed 2/2024 followed by GI   Continue Protonix    Medical Weight Loss  Med Refill- Topamax BID  3/15/24- change Refill Topamax BID  7/8/24- med refill  9/30- med refill Topamax 25 mg BID- required an appointment for f/u  10/11/24- dose increase Topamax 50 mg BID- caution with mentation effects   For 2 weeks do 75mg/day (50mg in AM, 25 mg in PM), then increase to 100 mg/day (50 mg in AM, 50 mg in PM)      Co morbidities:     1. Obesity (BMI 30-39.9)        2. History of sleeve gastrectomy              This includes face to face time and non-face to face time preparing to see the patient (eg, review of tests), obtaining and/or reviewing separately obtained history, documenting clinical information in the electronic or other health record, independently interpreting results and communicating results to the patient/family/caregiver, or care coordinator.

## 2024-10-11 NOTE — PATIENT INSTRUCTIONS
dose increase Topamax 50 mg BID- caution with mentation effects that can occur  For 2 weeks do 75mg per day (50mg in AM, 25 mg in PM), then increase to 100 mg per day (50 mg in AM, 50 mg in PM)    Clear, protein powder can be added to water, typically 20 gm protein- brands like Isopure Fusion, Oath, Seeq  Unflavored protein, Isopure, can add 25g to foods

## 2024-10-16 ENCOUNTER — OFFICE VISIT (OUTPATIENT)
Dept: CARDIOLOGY | Facility: CLINIC | Age: 74
End: 2024-10-16
Payer: MEDICARE

## 2024-10-16 VITALS
BODY MASS INDEX: 36.8 KG/M2 | HEIGHT: 63 IN | DIASTOLIC BLOOD PRESSURE: 79 MMHG | HEART RATE: 70 BPM | SYSTOLIC BLOOD PRESSURE: 117 MMHG | WEIGHT: 207.69 LBS

## 2024-10-16 DIAGNOSIS — I10 ESSENTIAL HYPERTENSION: ICD-10-CM

## 2024-10-16 DIAGNOSIS — Z95.5 STENTED CORONARY ARTERY: Chronic | ICD-10-CM

## 2024-10-16 DIAGNOSIS — I25.10 ATHEROSCLEROSIS OF NATIVE CORONARY ARTERY OF NATIVE HEART WITHOUT ANGINA PECTORIS: Primary | ICD-10-CM

## 2024-10-16 DIAGNOSIS — I25.2 HISTORY OF NON-ST ELEVATION MYOCARDIAL INFARCTION (NSTEMI): ICD-10-CM

## 2024-10-16 DIAGNOSIS — E78.5 DYSLIPIDEMIA: ICD-10-CM

## 2024-10-16 PROCEDURE — 99214 OFFICE O/P EST MOD 30 MIN: CPT | Mod: S$PBB,,, | Performed by: INTERNAL MEDICINE

## 2024-10-16 PROCEDURE — 99999 PR PBB SHADOW E&M-EST. PATIENT-LVL III: CPT | Mod: PBBFAC,,, | Performed by: INTERNAL MEDICINE

## 2024-10-16 PROCEDURE — 99213 OFFICE O/P EST LOW 20 MIN: CPT | Mod: PBBFAC,PO | Performed by: INTERNAL MEDICINE

## 2024-10-16 RX ORDER — AMLODIPINE BESYLATE 5 MG/1
5 TABLET ORAL DAILY
Qty: 90 TABLET | Refills: 3 | Status: SHIPPED | OUTPATIENT
Start: 2024-10-16

## 2024-10-16 RX ORDER — CLOPIDOGREL BISULFATE 75 MG/1
75 TABLET ORAL DAILY
Qty: 90 TABLET | Refills: 4 | Status: SHIPPED | OUTPATIENT
Start: 2024-10-16

## 2024-10-16 RX ORDER — CARVEDILOL 6.25 MG/1
6.25 TABLET ORAL DAILY
Qty: 180 TABLET | Refills: 4 | Status: SHIPPED | OUTPATIENT
Start: 2024-10-16

## 2024-10-16 RX ORDER — ATORVASTATIN CALCIUM 10 MG/1
10 TABLET, FILM COATED ORAL DAILY
Qty: 90 TABLET | Refills: 3 | Status: SHIPPED | OUTPATIENT
Start: 2024-10-16

## 2024-10-16 NOTE — PROGRESS NOTES
Subjective:    Patient ID:  Nancy Figueroa is a 74 y.o. female who presents for follow-up of coronary artery disease    HPI  She comes with no complaints, no chest pain, no shortness of breath  FC II  No cardiac issues    Review of Systems   Constitutional: Negative for decreased appetite, malaise/fatigue, weight gain and weight loss.   Cardiovascular:  Negative for chest pain, dyspnea on exertion, leg swelling, palpitations and syncope.   Respiratory:  Negative for cough and shortness of breath.    Gastrointestinal: Negative.    Neurological:  Negative for weakness.   All other systems reviewed and are negative.       Objective:      Physical Exam  Vitals and nursing note reviewed.   Constitutional:       Appearance: Normal appearance. She is well-developed.   HENT:      Head: Normocephalic.   Eyes:      Pupils: Pupils are equal, round, and reactive to light.   Neck:      Thyroid: No thyromegaly.      Vascular: No carotid bruit or JVD.   Cardiovascular:      Rate and Rhythm: Normal rate and regular rhythm.      Chest Wall: PMI is not displaced.      Pulses: Normal pulses and intact distal pulses.      Heart sounds: Normal heart sounds. No murmur heard.     No gallop.   Pulmonary:      Effort: Pulmonary effort is normal.      Breath sounds: Normal breath sounds.   Abdominal:      Palpations: Abdomen is soft. There is no mass.      Tenderness: There is no abdominal tenderness.   Musculoskeletal:         General: Normal range of motion.      Cervical back: Normal range of motion and neck supple.   Skin:     General: Skin is warm.   Neurological:      Mental Status: She is alert and oriented to person, place, and time.      Sensory: No sensory deficit.      Deep Tendon Reflexes: Reflexes are normal and symmetric.             Most Recent EKG Results  Results for orders placed or performed during the hospital encounter of 08/01/24   EKG 12-lead    Collection Time: 08/01/24  9:08 AM   Result Value Ref Range    QRS  Duration 70 ms    [facility] QTC Calculation 353 ms    Narrative    Test Reason : R06.02,    Vent. Rate : 048 BPM     Atrial Rate : 048 BPM     P-R Int : 162 ms          QRS Dur : 070 ms      QT Int : 396 ms       P-R-T Axes : 063 047 051 degrees     QTc Int : 353 ms    Sinus bradycardia  Otherwise normal ECG  When compared with ECG of 10-AUG-2023 10:09,  Previous ECG has undetermined rhythm, needs review  Criteria for Septal infarct are no longer Present  QT has shortened  Confirmed by Dylan Boogie ([id number]) on 8/7/2024 8:50:14 PM    Referred By:  KIMBERLEY           Confirmed By:Dylan Boogie       Most Recent Echocardiogram Results  Results for orders placed in visit on 08/10/23    Stress Echo    Interpretation Summary    Left Ventricle: The left ventricle is normal in size. There is concentric remodeling. Normal wall motion. There is normal systolic function with a visually estimated ejection fraction of 55 - 60%. Grade II diastolic dysfunction.    Right Ventricle: Normal right ventricular cavity size. Systolic function is normal.    Stress Protocol: The patient was stressed using an exercise protocol. The patient exercised for 3 minutes 58 seconds on a high ramp protocol, corresponding to a functional capacity of 7 METS, achieving a peak heart rate of 116 bpm, which is 81 % of the age predicted maximum heart rate. Their exercise capacity was moderately impaired. The test was stopped because the patient experienced fatigue.    Post-stress      Most Recent Nuclear Stress Test Results  Results for orders placed during the hospital encounter of 11/13/18    Stress test with myocardial perfusion    Interpretation Summary  · The patient reported SOB (non-anginal) during the stress test.  · The test was stopped secondary to fatigue and shortness of breath.  · There were no arrhythmias during stress.  · The EKG portion of this study is negative for myocardial ischemia.  · Rest Ejection Fraction is 65 %.  · LV cavity size  at rest is normal.  · There is a small amount of ischemia in the anterior wall(s).  · There is a mild intensity defect in the anterior wall of the left ventricle, secondary to breast attenuation.      Most Recent Cardiac PET Stress Test Results  No results found for this or any previous visit.      Most Recent Cardiovascular Angiogram results  No results found for this or any previous visit.      Other Most Recent Cardiology Results  Results for orders placed during the hospital encounter of 08/01/24    Cardiac monitoring strips      Labs reviewed    Assessment:       1. Atherosclerosis of native coronary artery of native heart without angina pectoris    2. Stented coronary artery    3. Essential hypertension    4. Dyslipidemia         Plan:     Continue:  Antiplatelet, ASA, Beta blocker, Calcium blocker, and Statin  Regular exercise program  Weight loss  Low cholesterol diet    Follow-up in 1 year with exercise stress echocardiogram and labs

## 2024-10-21 ENCOUNTER — IMMUNIZATION (OUTPATIENT)
Dept: FAMILY MEDICINE | Facility: CLINIC | Age: 74
End: 2024-10-21
Payer: MEDICARE

## 2024-10-21 DIAGNOSIS — Z23 NEED FOR VACCINATION: Primary | ICD-10-CM

## 2024-10-21 PROCEDURE — 99999PBSHW FLU VACCINE - ADJUVANTED: Mod: PBBFAC,,,

## 2024-10-21 PROCEDURE — 90653 IIV ADJUVANT VACCINE IM: CPT | Mod: PBBFAC,PO

## 2024-10-21 PROCEDURE — G0008 ADMIN INFLUENZA VIRUS VAC: HCPCS | Mod: PBBFAC,PO

## 2024-11-06 NOTE — PROGRESS NOTES
Doing well post procedure  Follow up in 1 month  
[FreeTextEntry1] : 1) Pneumonia- with decreased appetite and fever- suspect atypical pneumonia and possible MYCOPLASMA- will send full viral panel to check due to risk of complications from pneumonia- starting ZITHROMAX today. CLOSE follow up in 2 days and sooner if worsening. Normal sats and no distress. If worsening cough, trouble breathing, shortness of breath, retractions- go to the ER. She is drinking well at home but eating less. She is taking pedialyte- continue ad enoc PO as tolerated. Looks well hydrated today. 2) Fever- Motrin given here- continue at home every 6 hours 3) Cough and ROM- with post nasal drip- will also give MOTRIN SALINE nasal spray and cool mist humidifier Persistent cough- prednisolone started for 3 days only- monitor closely

## 2024-11-11 ENCOUNTER — OFFICE VISIT (OUTPATIENT)
Dept: URGENT CARE | Facility: CLINIC | Age: 74
End: 2024-11-11
Payer: MEDICARE

## 2024-11-11 VITALS
HEART RATE: 84 BPM | DIASTOLIC BLOOD PRESSURE: 60 MMHG | RESPIRATION RATE: 20 BRPM | SYSTOLIC BLOOD PRESSURE: 110 MMHG | TEMPERATURE: 99 F | OXYGEN SATURATION: 98 % | HEIGHT: 63 IN | BODY MASS INDEX: 36.68 KG/M2 | WEIGHT: 207 LBS

## 2024-11-11 DIAGNOSIS — R06.02 SHORTNESS OF BREATH: ICD-10-CM

## 2024-11-11 DIAGNOSIS — R50.9 FEVER, UNSPECIFIED FEVER CAUSE: Primary | ICD-10-CM

## 2024-11-11 LAB
CTP QC/QA: YES
CTP QC/QA: YES
POC MOLECULAR INFLUENZA A AGN: NEGATIVE
POC MOLECULAR INFLUENZA B AGN: NEGATIVE
SARS-COV-2 AG RESP QL IA.RAPID: NEGATIVE

## 2024-11-11 PROCEDURE — 99213 OFFICE O/P EST LOW 20 MIN: CPT | Mod: S$GLB,,, | Performed by: PHYSICIAN ASSISTANT

## 2024-11-11 PROCEDURE — 87502 INFLUENZA DNA AMP PROBE: CPT | Mod: QW,S$GLB,, | Performed by: PHYSICIAN ASSISTANT

## 2024-11-11 PROCEDURE — 87811 SARS-COV-2 COVID19 W/OPTIC: CPT | Mod: QW,S$GLB,, | Performed by: PHYSICIAN ASSISTANT

## 2024-11-11 RX ORDER — DOXYCYCLINE HYCLATE 100 MG
100 TABLET ORAL 2 TIMES DAILY
Qty: 20 TABLET | Refills: 0 | Status: SHIPPED | OUTPATIENT
Start: 2024-11-11 | End: 2024-11-21

## 2024-11-11 RX ORDER — AZELASTINE 1 MG/ML
1 SPRAY, METERED NASAL 2 TIMES DAILY PRN
Qty: 30 ML | Refills: 3 | Status: SHIPPED | OUTPATIENT
Start: 2024-11-11

## 2024-11-11 RX ORDER — LEVALBUTEROL TARTRATE 45 UG/1
1-2 AEROSOL, METERED ORAL EVERY 4 HOURS PRN
Qty: 15 G | Refills: 0 | Status: SHIPPED | OUTPATIENT
Start: 2024-11-11

## 2024-11-11 RX ORDER — FLUTICASONE PROPIONATE 50 MCG
1 SPRAY, SUSPENSION (ML) NASAL 2 TIMES DAILY
Qty: 16 ML | Refills: 3 | Status: SHIPPED | OUTPATIENT
Start: 2024-11-11

## 2024-11-11 NOTE — PROGRESS NOTES
"Subjective:      Patient ID: Nancy Figueroa is a 74 y.o. female.    Vitals:  height is 5' 3" (1.6 m) and weight is 93.9 kg (207 lb). Her oral temperature is 99 °F (37.2 °C). Her blood pressure is 110/60 and her pulse is 84. Her respiration is 20 and oxygen saturation is 98%.     Chief Complaint: Sinus Problem    Pt state sx started last Tuesday with  body aches, chills,  sinus pressure , sinus congestion, headache and fever, sore throat, occ sob, denies cough,   Home TX: tylenol and nyquil     Sinus Problem  This is a new problem. The current episode started in the past 7 days. The problem has been gradually worsening since onset. The maximum temperature recorded prior to her arrival was 102 - 102.9 F. Her pain is at a severity of 4/10. The pain is moderate. Associated symptoms include chills, congestion, headaches, sinus pressure, sneezing and a sore throat. Pertinent negatives include no coughing, diaphoresis, ear pain, hoarse voice, neck pain, shortness of breath or swollen glands. Treatments tried: nyquil. The treatment provided moderate relief.       Constitution: Positive for chills and fever. Negative for sweating and fatigue.   HENT:  Positive for congestion, postnasal drip, sinus pain, sinus pressure and sore throat. Negative for ear pain, drooling, nosebleeds, foreign body in nose, trouble swallowing and voice change.    Neck: Negative for neck pain, neck stiffness, painful lymph nodes and neck swelling.   Cardiovascular:  Negative for chest pain, leg swelling, palpitations, sob on exertion and passing out.   Eyes:  Negative for eye pain, eye redness, photophobia, double vision, blurred vision and eyelid swelling.   Respiratory:  Negative for chest tightness, cough, sputum production, bloody sputum, shortness of breath, stridor and wheezing.    Gastrointestinal:  Negative for abdominal pain, abdominal bloating, nausea, vomiting, constipation, diarrhea and heartburn.   Genitourinary:  Negative for " dysuria, flank pain and hematuria.   Musculoskeletal:  Positive for muscle ache. Negative for joint pain, joint swelling, abnormal ROM of joint, back pain and muscle cramps.   Skin:  Negative for rash and hives.   Allergic/Immunologic: Positive for sneezing. Negative for seasonal allergies, food allergies, hives and itching.   Neurological:  Positive for headaches. Negative for dizziness, light-headedness, passing out, facial drooping, speech difficulty, loss of balance, altered mental status, loss of consciousness and seizures.   Hematologic/Lymphatic: Negative for swollen lymph nodes.   Psychiatric/Behavioral:  Negative for altered mental status and nervous/anxious. The patient is not nervous/anxious.       Objective:     Physical Exam   Constitutional: She is oriented to person, place, and time. She appears well-developed. She is cooperative.  Non-toxic appearance. She does not appear ill. No distress.   HENT:   Head: Normocephalic and atraumatic.   Ears:   Right Ear: Hearing, tympanic membrane, external ear and ear canal normal.   Left Ear: Hearing, tympanic membrane, external ear and ear canal normal.   Nose: Mucosal edema and rhinorrhea present. No nasal deformity. No epistaxis. Right sinus exhibits no maxillary sinus tenderness and no frontal sinus tenderness. Left sinus exhibits no maxillary sinus tenderness and no frontal sinus tenderness.   Mouth/Throat: Uvula is midline and mucous membranes are normal. No trismus in the jaw. Normal dentition. No uvula swelling. Posterior oropharyngeal erythema and cobblestoning present. No oropharyngeal exudate, posterior oropharyngeal edema or tonsillar abscesses. No tonsillar exudate.   Eyes: Conjunctivae and lids are normal. No scleral icterus.   Neck: Trachea normal and phonation normal. Neck supple. No edema present. No erythema present. No neck rigidity present.   Cardiovascular: Normal rate, regular rhythm, normal heart sounds and normal pulses.   Pulmonary/Chest:  Effort normal and breath sounds normal. No accessory muscle usage or stridor. No respiratory distress. She has no decreased breath sounds. She has no wheezes. She has no rhonchi. She has no rales.   Abdominal: Normal appearance.   Musculoskeletal: Normal range of motion.         General: No deformity or edema. Normal range of motion.   Lymphadenopathy:     She has no cervical adenopathy.   Neurological: She is alert and oriented to person, place, and time. She exhibits normal muscle tone. Coordination normal.   Skin: Skin is warm, dry, intact, not diaphoretic, not pale and no rash. Capillary refill takes less than 2 seconds.   Psychiatric: Her speech is normal and behavior is normal. Judgment and thought content normal.   Nursing note and vitals reviewed.    Results for orders placed or performed in visit on 11/11/24   SARS Coronavirus 2 Antigen, POCT Manual Read    Collection Time: 11/11/24 10:02 AM   Result Value Ref Range    SARS Coronavirus 2 Antigen Negative Negative     Acceptable Yes      Assessment:     1. Fever, unspecified fever cause      Plan:   Discussed test results done in clinic today with patient. Advised close follow-up with PCP and/or Specialist for further evaluation as needed. ER precautions given to patient as well. Patient aware, verbalized understanding and agreed with plan of care.    Fever, unspecified fever cause  -     SARS Coronavirus 2 Antigen, POCT Manual Read  -     POCT Influenza A/B MOLECULAR    There are no Patient Instructions on file for this visit.

## 2024-11-18 ENCOUNTER — OFFICE VISIT (OUTPATIENT)
Dept: FAMILY MEDICINE | Facility: CLINIC | Age: 74
End: 2024-11-18
Payer: MEDICARE

## 2024-11-18 VITALS
WEIGHT: 200.63 LBS | SYSTOLIC BLOOD PRESSURE: 108 MMHG | BODY MASS INDEX: 35.55 KG/M2 | HEART RATE: 79 BPM | OXYGEN SATURATION: 99 % | HEIGHT: 63 IN | DIASTOLIC BLOOD PRESSURE: 60 MMHG

## 2024-11-18 DIAGNOSIS — Z12.31 ENCOUNTER FOR SCREENING MAMMOGRAM FOR MALIGNANT NEOPLASM OF BREAST: ICD-10-CM

## 2024-11-18 DIAGNOSIS — J22 LOWER RESPIRATORY INFECTION (E.G., BRONCHITIS, PNEUMONIA, PNEUMONITIS, PULMONITIS): Primary | ICD-10-CM

## 2024-11-18 DIAGNOSIS — I10 ESSENTIAL HYPERTENSION: ICD-10-CM

## 2024-11-18 PROCEDURE — 99999 PR PBB SHADOW E&M-EST. PATIENT-LVL IV: CPT | Mod: PBBFAC,,, | Performed by: PHYSICIAN ASSISTANT

## 2024-11-18 PROCEDURE — 99214 OFFICE O/P EST MOD 30 MIN: CPT | Mod: S$PBB,,, | Performed by: PHYSICIAN ASSISTANT

## 2024-11-18 PROCEDURE — 99214 OFFICE O/P EST MOD 30 MIN: CPT | Mod: PBBFAC,PO | Performed by: PHYSICIAN ASSISTANT

## 2024-11-18 NOTE — PROGRESS NOTES
"Subjective:      Patient ID: Nancy Figueroa is a 74 y.o. female.    Chief Complaint: Follow-up    Patient is new to me.    HPI  Patient has PMH of HTN, NSTEMI, dyslipidemia, GIST, hypothyroidism, gastric sleeve.    [facility]/11/2024 with lower respiratory infection.  Prescribed doxycycline 100mg BID, astelin, flonase, and levalbuterol solution.    Patient reports she still feels weak, but otherwise, her symptoms have improved.  Denies fever, cough.  Using breathing treatments twice a day.  Feels near syncope when she stands for a long period of time.    Review of Systems   Constitutional:  Positive for appetite change. Negative for chills and fever.   HENT:  Positive for ear pain (slight) and sore throat (slight).    Eyes:  Negative for pain.   Respiratory:  Positive for shortness of breath. Negative for cough.    Cardiovascular:  Negative for chest pain.   Gastrointestinal:  Negative for abdominal pain, blood in stool, constipation, diarrhea, nausea and vomiting.   Neurological:  Positive for syncope (near-slight improvement) and weakness.       Objective:   /60   Pulse 79   Ht 5' 3" (1.6 m)   Wt 91 kg (200 lb 9.9 oz)   SpO2 99%   BMI 35.54 kg/m²     Physical Exam  Vitals reviewed.   Constitutional:       Appearance: Normal appearance. She is well-developed.   HENT:      Head: Normocephalic and atraumatic.      Right Ear: Hearing, tympanic membrane, ear canal and external ear normal.      Left Ear: Hearing, tympanic membrane, ear canal and external ear normal.      Nose: Nose normal.      Right Sinus: No maxillary sinus tenderness or frontal sinus tenderness.      Left Sinus: No maxillary sinus tenderness or frontal sinus tenderness.      Mouth/Throat:      Lips: Pink.      Mouth: Mucous membranes are moist.      Pharynx: Oropharynx is clear.   Eyes:      General: Lids are normal.      Conjunctiva/sclera: Conjunctivae normal.   Cardiovascular:      Rate and Rhythm: " Normal rate and regular rhythm.      Heart sounds: Normal heart sounds. No murmur heard.     No friction rub. No gallop.   Pulmonary:      Effort: Pulmonary effort is normal. No respiratory distress.      Breath sounds: Normal breath sounds. No decreased breath sounds, wheezing, rhonchi or rales.   Musculoskeletal:         General: Normal range of motion.   Lymphadenopathy:      Cervical: No cervical adenopathy.   Skin:     General: Skin is warm and dry.      Findings: No rash.   Neurological:      General: No focal deficit present.      Mental Status: She is alert and oriented to person, place, and time.   Psychiatric:         Mood and Affect: Mood normal.         Behavior: Behavior normal. Behavior is cooperative.         Judgment: Judgment normal.       Assessment:      1. Lower respiratory infection (e.g., bronchitis, pneumonia, pneumonitis, pulmonitis)    2. Essential hypertension    3. Encounter for screening mammogram for malignant neoplasm of breast       Plan:   1. Lower respiratory infection (e.g., bronchitis, pneumonia, pneumonitis, pulmonitis) (Primary)  Discontinue doxycycline and levalbuterol inhaler now.    2. Essential hypertension  Hold amlodipine for 2 days to allow blood pressure to increase.    3. Encounter for screening mammogram for malignant neoplasm of breast  - Mammo Digital Screening [facility] w/ [facility]; Future    Follow up as scheduled.  Patient agreed with plan and expressed understanding.    Thank you for allowing me to serve you,

## 2024-11-21 ENCOUNTER — HOSPITAL ENCOUNTER (OUTPATIENT)
Dept: RADIOLOGY | Facility: HOSPITAL | Age: 74
Discharge: HOME | End: 2024-11-21
Attending: PHYSICIAN ASSISTANT
Payer: MEDICARE

## 2024-11-21 DIAGNOSIS — Z12.31 ENCOUNTER FOR SCREENING MAMMOGRAM FOR MALIGNANT NEOPLASM OF BREAST: ICD-10-CM

## 2024-11-21 PROCEDURE — 77067 SCR MAMMO BI INCL CAD: CPT | Mod: TC,PO

## 2024-11-21 PROCEDURE — 77067 SCR MAMMO BI INCL CAD: CPT | Mod: 26,,, | Performed by: RADIOLOGY

## 2024-11-21 PROCEDURE — 77063 BREAST TOMOSYNTHESIS BI: CPT | Mod: 26,,, | Performed by: RADIOLOGY

## 2024-11-23 ENCOUNTER — NURSE TRIAGE (OUTPATIENT)
Dept: ADMINISTRATIVE | Facility: CLINIC | Age: 74
End: 2024-11-23
Payer: MEDICARE

## 2024-11-23 ENCOUNTER — OFFICE VISIT (OUTPATIENT)
Dept: URGENT CARE | Facility: CLINIC | Age: 74
End: 2024-11-23
Payer: MEDICARE

## 2024-11-23 VITALS
HEIGHT: 63 IN | TEMPERATURE: 98 F | HEART RATE: 82 BPM | WEIGHT: 200 LBS | RESPIRATION RATE: 17 BRPM | BODY MASS INDEX: 35.44 KG/M2 | SYSTOLIC BLOOD PRESSURE: 114 MMHG | OXYGEN SATURATION: 98 % | DIASTOLIC BLOOD PRESSURE: 75 MMHG

## 2024-11-23 DIAGNOSIS — R30.0 DYSURIA: ICD-10-CM

## 2024-11-23 DIAGNOSIS — N30.01 ACUTE CYSTITIS WITH HEMATURIA: Primary | ICD-10-CM

## 2024-11-23 LAB
BILIRUBIN, UA POC OHS: ABNORMAL
BLOOD, UA POC OHS: ABNORMAL
CLARITY, UA POC OHS: CLEAR
COLOR, UA POC OHS: ABNORMAL
GLUCOSE, UA POC OHS: 250
KETONES, UA POC OHS: 15
LEUKOCYTES, UA POC OHS: ABNORMAL
NITRITE, UA POC OHS: POSITIVE
PH, UA POC OHS: 5
PROTEIN, UA POC OHS: >=300
SPECIFIC GRAVITY, UA POC OHS: 1.01
UROBILINOGEN, UA POC OHS: >=8

## 2024-11-23 PROCEDURE — 87186 SC STD MICRODIL/AGAR DIL: CPT | Performed by: PHYSICIAN ASSISTANT

## 2024-11-23 PROCEDURE — 87086 URINE CULTURE/COLONY COUNT: CPT | Performed by: PHYSICIAN ASSISTANT

## 2024-11-23 PROCEDURE — 99213 OFFICE O/P EST LOW 20 MIN: CPT | Mod: S$GLB,,, | Performed by: PHYSICIAN ASSISTANT

## 2024-11-23 PROCEDURE — 81003 URINALYSIS AUTO W/O SCOPE: CPT | Mod: QW,S$GLB,, | Performed by: PHYSICIAN ASSISTANT

## 2024-11-23 PROCEDURE — 87088 URINE BACTERIA CULTURE: CPT | Performed by: PHYSICIAN ASSISTANT

## 2024-11-23 RX ORDER — NITROFURANTOIN 25; 75 MG/1; MG/1
100 CAPSULE ORAL 2 TIMES DAILY
Qty: 14 CAPSULE | Refills: 0 | Status: SHIPPED | OUTPATIENT
Start: 2024-11-23 | End: 2024-11-30

## 2024-11-23 NOTE — TELEPHONE ENCOUNTER
Pt called with c/o dysuria that started this morning. Pt reports taking AZO just prior to calling. Care advice recommends see physician within 4 hours. Pt verbalized understanding and started she will go to .  Reason for Disposition   [1] SEVERE pain with urination (e.g., excruciating) AND [2] not improved after 2 hours of pain medicine and Sitz bath    Additional Information   Negative: Shock suspected (e.g., cold/pale/clammy skin, too weak to stand, low BP, rapid pulse)   Negative: Sounds like a life-threatening emergency to the triager   [1] Pain or burning with passing urine (urination) AND [2] female   Negative: Shock suspected (e.g., cold/pale/clammy skin, too weak to stand, low BP, rapid pulse)   Negative: Sounds like a life-threatening emergency to the triager   Negative: [1] Unable to urinate (or only a few drops) > 4 hours AND [2] bladder feels very full (e.g., palpable bladder or strong urge to urinate)   Negative: Vomiting   Negative: Patient sounds very sick or weak to the triager    Protocols used: Urinary Symptoms-A-AH, Urination Pain - Female-A-AH

## 2024-11-23 NOTE — PROGRESS NOTES
"  Subjective:      Patient ID: Nancy Figueroa is a 74 y.o. female.    Vitals:  height is 5' 3" (1.6 m) and weight is 90.7 kg (200 lb). Her oral temperature is 98.3 °F (36.8 °C). Her blood pressure is 114/75 and her pulse is 82. Her respiration is 17 and oxygen saturation is 98%.     Chief Complaint: No chief complaint on file.    Pt present in UC with a possible UTI, pt states the she is having a burning sensation went urinating, urgency to urinate but nothing comes out, onset 1day ago, pts states the she has been taking otc med     Urinary Tract Infection   This is a new problem. The current episode started yesterday. The problem has been unchanged. The quality of the pain is described as burning. There has been no fever. Associated symptoms include frequency and urgency. Pertinent negatives include no chills, flank pain, hematuria, nausea, vomiting, constipation or rash.       Constitution: Negative for chills, sweating, fatigue and fever.   HENT:  Negative for ear pain, drooling, congestion, sore throat, trouble swallowing and voice change.    Neck: Negative for neck pain, neck stiffness, painful lymph nodes and neck swelling.   Cardiovascular:  Negative for chest pain, leg swelling, palpitations, sob on exertion and passing out.   Eyes:  Negative for eye pain, eye redness, photophobia, double vision, blurred vision and eyelid swelling.   Respiratory:  Negative for chest tightness, cough, sputum production, bloody sputum, shortness of breath, stridor and wheezing.    Gastrointestinal:  Negative for abdominal pain, abdominal bloating, nausea, vomiting, constipation, diarrhea and heartburn.   Genitourinary:  Positive for dysuria, frequency and urgency. Negative for flank pain, hematuria, vaginal pain, vaginal discharge, vaginal bleeding, vaginal odor, painful intercourse, genital sore and pelvic pain.   Musculoskeletal:  Negative for joint pain, joint swelling, abnormal ROM of joint, back pain, muscle cramps and " muscle ache.   Skin:  Negative for rash and hives.   Allergic/Immunologic: Negative for seasonal allergies, food allergies, hives, itching and sneezing.   Neurological:  Negative for dizziness, light-headedness, passing out, loss of balance, headaches, altered mental status, loss of consciousness and seizures.   Hematologic/Lymphatic: Negative for swollen lymph nodes.   Psychiatric/Behavioral:  Negative for altered mental status and nervous/anxious. The patient is not nervous/anxious.       Objective:     Physical Exam   Constitutional: She is oriented to person, place, and time. She appears well-developed. She is cooperative.  Non-toxic appearance. She does not appear ill. No distress.   HENT:   Head: Normocephalic and atraumatic.   Ears:   Right Ear: External ear normal.   Left Ear: External ear normal.   Nose: Nose normal. No nasal deformity. No epistaxis.   Mouth/Throat: Uvula is midline, oropharynx is clear and moist and mucous membranes are normal.   Eyes: Conjunctivae and lids are normal. No scleral icterus.   Neck: Trachea normal and phonation normal. Neck supple. No edema present. No erythema present. No neck rigidity present.   Cardiovascular: Normal rate, regular rhythm, normal heart sounds and normal pulses.   Pulmonary/Chest: Effort normal and breath sounds normal. No accessory muscle usage or stridor. No respiratory distress. She has no decreased breath sounds. She has no wheezes. She has no rhonchi. She has no rales.   Abdominal: Normal appearance and bowel sounds are normal. Soft. There is no abdominal tenderness. There is no rebound, no guarding, no left CVA tenderness and no right CVA tenderness.   Musculoskeletal: Normal range of motion.         General: No deformity. Normal range of motion.   Neurological: She is alert and oriented to person, place, and time. She exhibits normal muscle tone. Coordination normal.   Skin: Skin is warm, dry, intact, not diaphoretic, not pale and no rash. Capillary  refill takes less than 2 seconds.   Psychiatric: Her speech is normal and behavior is normal. Judgment and thought content normal.   Nursing note and vitals reviewed.      Assessment:     1. Acute cystitis with hematuria    2. Dysuria        Plan:       Acute cystitis with hematuria    Dysuria  -     POCT Urinalysis(Instrument)  -     CULTURE, URINE    Other orders  -     nitrofurantoin, macrocrystal-monohydrate, (MACROBID) 100 MG capsule; Take 1 capsule (100 mg total) by mouth 2 (two) times daily. for 7 days  Dispense: 14 capsule; Refill: 0      UTI Relief   - Increase water intake.  - Decrease sodas, tea, coffee and energy drinks until symptoms resolve.  - Cranberry products are thought to protect against UTI by inhibiting bacterial growth and adhesion to the bladder.  - Tylenol (acetaminophen) and/or NSAIDS (motrin, ibuprofen) as needed for discomfort.  - Timed voiding every 2-3 hours ( void every 2-3 hours even if you do not feel the urge).  - OTC AZO/pyridium if symptoms are persistent - this will turn your urine red/orange. This will help with symptomatic relief, but will not treat the infection.  - Avoid tight fitting cloths, douching, tampon use.  - Wipe front to back.   - Void prior to and after intercourse.   - Use probiotics especially with any antibiotic therapy.  Patient aware and verbalized understanding.     INSTRUCTIONS:  - Rest.  - Drink plenty of fluids.  - Take Tylenol and/or Ibuprofen as directed as needed for fever/pain.  Do not take more than the recommended dose.  - follow up with your PCP within the next 1-2 weeks as needed.  - You must understand that you have received an Urgent Care treatment only and that you may be released before all of your medical problems are known or treated.   - You, the patient, will arrange for follow up care as instructed.   - If your condition worsens or fails to improve we recommend that you receive another evaluation at the ER immediately or contact your PCP  to discuss your concerns.   - You can call [phone] or [phone] to help schedule an appointment with the appropriate provider.     -If you smoke cigarettes, it would be beneficial for you to stop.

## 2024-11-24 ENCOUNTER — NURSE TRIAGE (OUTPATIENT)
Dept: ADMINISTRATIVE | Facility: CLINIC | Age: 74
End: 2024-11-24
Payer: MEDICARE

## 2024-11-24 NOTE — TELEPHONE ENCOUNTER
"Patient calling, states she went to  for UTI, and ABX send in but [facility] will not fill till noon. Wants to know she should do. Advised pharmacy received RX yesterday at 5:41. Advised not sure what I could do. All questions answered re what ABX sent in and pending C/S and abnormal Urinalysis. States she also has a H/A, started 3 weeks ago. Has been to  for that but still has it. All previous care and visits discussed. Explained triage process. Triage done for H/A. Dispo see provider when office open. Offered appointment in am, states she only wants to see Dr. Pantoja. Advised I would send message to office to be seen in am. Asking what S/S should she go to ED. Discussed any vision changes, worsening H/A, numbness, weakness, speech changes, call back or ED. Verb understanding.   Reason for Disposition   [1] MILD-MODERATE headache AND [2] present > 3 days (72 hours) AND [3] no improvement after using Care Advice    Additional Information   Negative: Difficult to awaken or acting confused (e.g., disoriented, slurred speech)   Negative: [1] Weakness of the face, arm or leg on one side of the body AND [2] new-onset   Negative: [1] Numbness of the face, arm or leg on one side of the body AND [2] new-onset   Negative: [1] Loss of speech or garbled speech AND [2] new-onset   Negative: Passed out (e.g., fainted, lost consciousness, blacked out and was not responding)   Negative: Sounds like a life-threatening emergency to the triager   Negative: Followed a head injury   Negative: Unable to walk, or can only walk with assistance (e.g., requires support)   Negative: Stiff neck (can't touch chin to chest)   Negative: Severe pain in one eye   Negative: [1] Other family members (or people in same household) with headaches AND [2] possibility of carbon monoxide exposure   Negative: [1] SEVERE headache (e.g., excruciating) AND [2] "worst headache" of life   Negative: [1] SEVERE headache AND [2] sudden-onset (i.e., reaching " maximum intensity within seconds to 1 hour)   Negative: [1] SEVERE headache AND [2] fever   Negative: Loss of vision or double vision  (Exception: Same as previously diagnosed migraines.)   Negative: [1] Fever > 100 F (37.8 C) AND [2] diabetes mellitus or weak immune system (e.g., HIV positive, cancer chemo, splenectomy, organ transplant, chronic steroids)   Negative: Patient sounds very sick or weak to the triager   Negative: [1] SEVERE headache (e.g., excruciating) AND [2] not improved after 2 hours of pain medicine   Negative: [1] Vomiting AND [2] 2 or more times  (Exception: Similar to previously diagnosed migraines.)   Negative: Fever > 104 F (40 C)   Negative: [1] MODERATE headache (e.g., interferes with normal activities) AND [2] present > 24 hours AND [3] unexplained  (Exceptions: Pain medicines not tried, typical migraine, or headache part of viral illness.)   Negative: Fever present > 3 days (72 hours)   Negative: [1] New-onset headache AND [2] weak immune system (e.g., HIV positive, cancer chemo, splenectomy, organ transplant, chronic steroids)    Protocols used: Headache-A-AH

## 2024-11-27 LAB — BACTERIA UR CULT: ABNORMAL

## 2025-01-08 RX ORDER — PANTOPRAZOLE SODIUM 40 MG/1
40 TABLET, DELAYED RELEASE ORAL
Qty: 30 TABLET | Refills: 0 | Status: SHIPPED | OUTPATIENT
Start: 2025-01-08

## 2025-01-30 DIAGNOSIS — Z00.00 ENCOUNTER FOR MEDICARE ANNUAL WELLNESS EXAM: ICD-10-CM

## 2025-03-05 DIAGNOSIS — Z90.3 HISTORY OF SLEEVE GASTRECTOMY: ICD-10-CM

## 2025-03-05 DIAGNOSIS — E66.9 OBESITY (BMI 30-39.9): ICD-10-CM

## 2025-03-10 RX ORDER — TOPIRAMATE 50 MG/1
50 TABLET, FILM COATED ORAL 2 TIMES DAILY
Qty: 180 TABLET | Refills: 3 | Status: SHIPPED | OUTPATIENT
Start: 2025-03-10 | End: 2026-03-10

## 2025-03-21 ENCOUNTER — LAB VISIT (OUTPATIENT)
Dept: LAB | Facility: HOSPITAL | Age: 75
End: 2025-03-21
Attending: INTERNAL MEDICINE
Payer: MEDICARE

## 2025-03-21 DIAGNOSIS — Z85.09 HISTORY OF GASTROINTESTINAL STROMAL TUMOR (GIST): ICD-10-CM

## 2025-03-21 LAB
ALBUMIN SERPL BCP-MCNC: 4 G/DL (ref 3.5–5.2)
ALP SERPL-CCNC: 77 U/L (ref 40–150)
ALT SERPL W/O P-5'-P-CCNC: 15 U/L (ref 10–44)
ANION GAP SERPL CALC-SCNC: 13 MMOL/L (ref 8–16)
AST SERPL-CCNC: 21 U/L (ref 10–40)
BASOPHILS # BLD AUTO: 0.06 K/UL (ref 0–0.2)
BASOPHILS NFR BLD: 1 % (ref 0–1.9)
BILIRUB SERPL-MCNC: 1.2 MG/DL (ref 0.1–1)
BUN SERPL-MCNC: 25 MG/DL (ref 8–23)
CALCIUM SERPL-MCNC: 9.2 MG/DL (ref 8.7–10.5)
CHLORIDE SERPL-SCNC: 112 MMOL/L (ref 95–110)
CO2 SERPL-SCNC: 19 MMOL/L (ref 23–29)
CREAT SERPL-MCNC: 0.9 MG/DL (ref 0.5–1.4)
DIFFERENTIAL METHOD BLD: NORMAL
EOSINOPHIL # BLD AUTO: 0.3 K/UL (ref 0–0.5)
EOSINOPHIL NFR BLD: 5.6 % (ref 0–8)
ERYTHROCYTE [DISTWIDTH] IN BLOOD BY AUTOMATED COUNT: 13 % (ref 11.5–14.5)
EST. GFR  (NO RACE VARIABLE): >60 ML/MIN/1.73 M^2
GLUCOSE SERPL-MCNC: 102 MG/DL (ref 70–110)
HCT VFR BLD AUTO: 39.8 % (ref 37–48.5)
HGB BLD-MCNC: 13.5 G/DL (ref 12–16)
IMM GRANULOCYTES # BLD AUTO: 0.01 K/UL (ref 0–0.04)
IMM GRANULOCYTES NFR BLD AUTO: 0.2 % (ref 0–0.5)
LYMPHOCYTES # BLD AUTO: 1.9 K/UL (ref 1–4.8)
LYMPHOCYTES NFR BLD: 31.8 % (ref 18–48)
MCH RBC QN AUTO: 30.7 PG (ref 27–31)
MCHC RBC AUTO-ENTMCNC: 33.9 G/DL (ref 32–36)
MCV RBC AUTO: 91 FL (ref 82–98)
MONOCYTES # BLD AUTO: 0.5 K/UL (ref 0.3–1)
MONOCYTES NFR BLD: 8.2 % (ref 4–15)
NEUTROPHILS # BLD AUTO: 3.2 K/UL (ref 1.8–7.7)
NEUTROPHILS NFR BLD: 53.2 % (ref 38–73)
NRBC BLD-RTO: 0 /100 WBC
PLATELET # BLD AUTO: 269 K/UL (ref 150–450)
PMV BLD AUTO: 10.7 FL (ref 9.2–12.9)
POTASSIUM SERPL-SCNC: 4.4 MMOL/L (ref 3.5–5.1)
PROT SERPL-MCNC: 8.3 G/DL (ref 6–8.4)
RBC # BLD AUTO: 4.4 M/UL (ref 4–5.4)
SODIUM SERPL-SCNC: 144 MMOL/L (ref 136–145)
WBC # BLD AUTO: 5.94 K/UL (ref 3.9–12.7)

## 2025-03-21 PROCEDURE — 85025 COMPLETE CBC W/AUTO DIFF WBC: CPT | Mod: PO | Performed by: INTERNAL MEDICINE

## 2025-03-21 PROCEDURE — 80053 COMPREHEN METABOLIC PANEL: CPT | Mod: PO | Performed by: INTERNAL MEDICINE

## 2025-03-25 ENCOUNTER — HOSPITAL ENCOUNTER (OUTPATIENT)
Dept: RADIOLOGY | Facility: HOSPITAL | Age: 75
Discharge: HOME | End: 2025-03-25
Attending: INTERNAL MEDICINE
Payer: MEDICARE

## 2025-03-25 DIAGNOSIS — Z85.09 HISTORY OF GASTROINTESTINAL STROMAL TUMOR (GIST): ICD-10-CM

## 2025-03-25 PROCEDURE — 74183 MRI ABD W/O CNTR FLWD CNTR: CPT | Mod: TC,PO

## 2025-03-25 PROCEDURE — 25500020 PHARM REV CODE 255: Mod: PO | Performed by: INTERNAL MEDICINE

## 2025-03-25 PROCEDURE — A9585 GADOBUTROL INJECTION: HCPCS | Mod: PO | Performed by: INTERNAL MEDICINE

## 2025-03-25 RX ORDER — GADOBUTROL 604.72 MG/ML
9 INJECTION INTRAVENOUS
Status: COMPLETED | OUTPATIENT
Start: 2025-03-25 | End: 2025-03-25

## 2025-03-25 RX ADMIN — GADOBUTROL 9 ML: 604.72 INJECTION INTRAVENOUS at 11:03

## 2025-03-26 NOTE — PROGRESS NOTES
PATIENT: Nancy Figueroa  MRN: [id number]  DATE: 3/27/2025      Diagnosis:   1. History of gastrointestinal stromal tumor (GIST)    2. Gastrointestinal stromal tumor (GIST)    3. Pulmonary nodules    4. Back pain, unspecified back location, unspecified back pain laterality, unspecified chronicity                Chief Complaint: History of gastrointestinal stromal tumor (GIST) (6 month follow up)      Oncologic History:      Oncologic History     Oncologic Treatment     Pathology           Subjective:    Interval History: Ms. Figueroa is a 74 y.o. female with CAD, HTN, GERD, Hypothyroidism, who presents for GIST.  Since clinic visit the patient underwent MRI abdomen on 2 03/25/2025 showing no clear evidence of recurrence but did show hepatic cysts as well as stable cystic foci in the pancreas.  The patient denies CP, cough, SOB, abdominal pain, nausea, vomiting, constipation, diarrhea.  The patient denies fever, chills, night sweats, weight loss, new lumps or bumps, easy bruising or bleeding.  The patient endorses right lower back pain which has been present for some time.    Prior History:  The patient was initially undergoing a gastric sleeve with a portion of the stomach resected on 1/27/20.  A GIST tumor was found in the stomach measuring 0.4x0.4x0.1cm, mitotic rate of 0/5mm2, low grade, 0.8cm from closest margin. pH4FiVv. Stage 1A.  Pt was initially seen by oncologist Dr Waite 2/24/20 whom ordered a Ct C/A/P on 3/13/20 showing two sub 5mm nodules in the left and right lung.  The patient then underwent surveillance scans.  PET/CT 8/18/21 showed a tiny pulmonary nodul.  Most recent CT chest 7/25/23 showed a stable 4 mm ground-glass nodule in the anterior inferior right upper lobe; stable 2 mm oval pleural-based nodule smooth margins in the left lower lobe; stable 2 mm juxtapleural nodule in the right upper lobe.   The patient underwent MRI of the abdomen on 09/25/2023 showing no evidence of recurrent or metastatic  disease.  Patient underwent EGD on 02/01/2024 showing small hiatal hernia, gastritis, and evidence of a sleeve gastrectomy.   The patient underwent MRI abdomen on 3/15/24 showing a stable 1.3 cm cyst in the medial inferior right hepatic lobe.   The patient underwent CTA chest on 08/01/2024 showed a stable ground-glass nodule in the right upper lobe; stable pleural-based 2 mm pulmonary nodule on the right.  MRI abdomen on 09/25/2024 showed a stable postsurgical changes from prior partial gastrectomy; stable 1.3 cm binocular right hepatic lobe cyst.    Past Medical History:   Past Medical History:   Diagnosis Date    Atherosclerotic heart disease of native coronary artery without angina pectoris 5/1/2017 4/17  Left main: normal  LAD: two diagonals  Less than 50%.  90% mid LAD Circumflex:  minimal disease, less than 20%. --  RCA: The vessel was large sized (dominant) with less than 20% stenosis.    Cancer     GIST stomach    Cataract     OU    Essential hypertension 10/18/2012    GERD (gastroesophageal reflux disease)     HTN (hypertension)     Hypothyroidism     NSTEMI (non-ST elevated myocardial infarction) 4/24/2017 4/2017    Stented coronary artery 7/26/2017 4/17 LAD-Synergy 3.0 x 28        Past Surgical HIstory:   Past Surgical History:   Procedure Laterality Date    APPENDECTOMY      BREAST CYST ASPIRATION      CARDIAC CATHETERIZATION  2017    LAD stent     CATARACT EXTRACTION Bilateral     per pt     COLONOSCOPY      COLONOSCOPY N/A 01/25/2022    Procedure: COLONOSCOPY;  Surgeon: Tanner Ron MD;  Location: [facility];  Service: Endoscopy;  Laterality: N/A;    ESOPHAGOGASTRODUODENOSCOPY N/A 11/18/2021    Procedure: EGD (ESOPHAGOGASTRODUODENOSCOPY);  Surgeon: Tanner Ron MD;  Location: [facility];  Service: Endoscopy;  Laterality: N/A;    ESOPHAGOGASTRODUODENOSCOPY N/A 2/1/2024    Procedure: ESOPHAGOGASTRODUODENOSCOPY (EGD);  Surgeon: Jose Luis Chong MD;  Location: [facility];  Service:  Endoscopy;  Laterality: N/A;    HYSTERECTOMY      YANG with BSO    LAPAROSCOPIC SLEEVE GASTRECTOMY N/A 01/27/2020    Procedure: GASTRECTOMY, SLEEVE, LAPAROSCOPIC;  Surgeon: Cirilo Mathias MD;  Location: [facility];  Service: General;  Laterality: N/A;    OOPHORECTOMY         Family History:   Family History   Problem Relation Name Age of Onset    Cancer Mother  80        breast cancer    Dementia Mother      Cataracts Mother      Breast cancer Mother  70    Lung cancer Father      Cancer Father          lung cancer    Cataracts Father      Obesity Sister      Obesity Sister      Obesity Sister      Breast cancer Paternal Aunt      Cancer Paternal Grandmother          colon       Social History:  reports that she has never smoked. She has never used smokeless tobacco. She reports that she does not drink alcohol and does not use drugs.    Allergies:  Review of patient's allergies indicates:  No Known Allergies    Medications:  Current Outpatient Medications   Medication Sig Dispense Refill    amLODIPine (NORVASC) 5 MG tablet Take 1 tablet (5 mg total) by mouth once daily. 90 tablet 3    aspirin (ECOTRIN) 81 MG EC tablet Take 81 mg by mouth every evening. HOLD FOR 1 WEEK PRIOR TO SURGERY      atorvastatin (LIPITOR) 10 MG tablet Take 1 tablet (10 mg total) by mouth once daily. 90 tablet 3    azelastine (ASTELIN) 137 mcg (0.1 %) nasal spray 1 spray (137 mcg total) by Nasal route 2 (two) times daily as needed for Rhinitis. 30 mL 3    carvediloL (COREG) 6.25 MG tablet Take 1 tablet (6.25 mg total) by mouth once daily. 180 tablet 4    clopidogreL (PLAVIX) 75 mg tablet Take 1 tablet (75 mg total) by mouth once daily. 90 tablet 4    cyanocobalamin 500 MCG tablet Take 500 mcg by mouth once daily.      fluticasone propionate (FLONASE ALLERGY RELIEF) 50 mcg/actuation nasal spray 1 spray (50 mcg total) by Each Nostril route 2 (two) times daily. 16 mL 3    levalbuterol (XOPENEX HFA) 45 mcg/actuation inhaler Inhale 1-2 puffs into  "the lungs every 4 (four) hours as needed for Wheezing or Shortness of Breath (cough). Rescue 15 g 0    levothyroxine (SYNTHROID) 112 MCG tablet TAKE 1 TABLET(112 MCG) BY MOUTH BEFORE BREAKFAST 90 tablet 3    multivitamin (THERAGRAN) per tablet Take 1 tablet by mouth once daily.      pantoprazole (PROTONIX) 40 MG tablet TAKE 1 TABLET(40 MG) BY MOUTH DAILY 30 tablet 0    topiramate (TOPAMAX) 50 MG tablet Take 1 tablet (50 mg total) by mouth 2 (two) times daily. 180 tablet 3     No current facility-administered medications for this visit.       Review of Systems   Constitutional:  Negative for chills, fatigue, fever and unexpected weight change.   Respiratory:  Negative for cough and shortness of breath.    Cardiovascular:  Negative for chest pain and palpitations.   Gastrointestinal:  Negative for abdominal pain, constipation, diarrhea, nausea and vomiting.   Musculoskeletal:  Positive for back pain.   Skin:  Negative for rash.   Neurological:  Negative for headaches.   Hematological:  Negative for adenopathy. Does not bruise/bleed easily.       ECOG Performance Status: 0   Objective:      Vitals:   Vitals:    03/27/25 1138   BP: 116/88   BP Location: Right arm   Patient Position: Sitting   Pulse: 88   Resp: 16   Temp: 97.9 °F (36.6 °C)   TempSrc: Temporal   SpO2: 97%   Weight: 95.3 kg (210 lb 1.6 oz)   Height: 5' 3" (1.6 m)               Physical Exam  Constitutional:       General: She is not in acute distress.     Appearance: She is well-developed. She is not diaphoretic.   HENT:      Head: Normocephalic and atraumatic.   Cardiovascular:      Rate and Rhythm: Normal rate and regular rhythm.      Heart sounds: Normal heart sounds. No murmur heard.     No friction rub. No gallop.   Pulmonary:      Effort: Pulmonary effort is normal. No respiratory distress.      Breath sounds: Normal breath sounds. No wheezing or rales.   Chest:      Chest wall: No tenderness.   Abdominal:      General: Bowel sounds are normal. There " is no distension.      Palpations: Abdomen is soft. There is no mass.      Tenderness: There is no abdominal tenderness. There is no guarding or rebound.   Lymphadenopathy:      Cervical: No cervical adenopathy.      Upper Body:      Right upper body: No supraclavicular or axillary adenopathy.      Left upper body: No supraclavicular or axillary adenopathy.   Skin:     Findings: No erythema or rash.   Neurological:      Mental Status: She is alert and oriented to person, place, and time.   Psychiatric:         Behavior: Behavior normal.         Laboratory Data:  Lab Visit on 03/21/2025   Component Date Value Ref Range Status    WBC 03/21/2025 5.94  3.90 - 12.70 K/uL Final    RBC 03/21/2025 4.40  4.00 - 5.40 M/uL Final    Hemoglobin 03/21/2025 13.5  12.0 - 16.0 g/dL Final    Hematocrit 03/21/2025 39.8  37.0 - 48.5 % Final    MCV 03/21/2025 91  82 - 98 fL Final    MCH 03/21/2025 30.7  27.0 - 31.0 pg Final    MCHC 03/21/2025 33.9  32.0 - 36.0 g/dL Final    RDW 03/21/2025 13.0  11.5 - 14.5 % Final    Platelets 03/21/2025 269  150 - 450 K/uL Final    MPV 03/21/2025 10.7  9.2 - 12.9 fL Final    Immature Granulocytes 03/21/2025 0.2  0.0 - 0.5 % Final    Gran # (ANC) 03/21/2025 3.2  1.8 - 7.7 K/uL Final    Immature Grans (Abs) 03/21/2025 0.01  0.00 - 0.04 K/uL Final    Comment: Mild elevation in immature granulocytes is non specific and   can be seen in a variety of conditions including stress response,   acute inflammation, trauma and pregnancy. Correlation with other   laboratory and clinical findings is essential.      Lymph # 03/21/2025 1.9  1.0 - 4.8 K/uL Final    Mono # 03/21/2025 0.5  0.3 - 1.0 K/uL Final    Eos # 03/21/2025 0.3  0.0 - 0.5 K/uL Final    Baso # 03/21/2025 0.06  0.00 - 0.20 K/uL Final    nRBC 03/21/2025 0  0 /100 WBC Final    Gran % 03/21/2025 53.2  38.0 - 73.0 % Final    Lymph % 03/21/2025 31.8  18.0 - 48.0 % Final    Mono % 03/21/2025 8.2  4.0 - 15.0 % Final    Eosinophil % 03/21/2025 5.6  0.0 - 8.0  % Final    Basophil % 03/21/2025 1.0  0.0 - 1.9 % Final    Differential Method 03/21/2025 Automated   Final    Sodium 03/21/2025 144  136 - 145 mmol/L Final    Potassium 03/21/2025 4.4  3.5 - 5.1 mmol/L Final    Chloride 03/21/2025 112 (H)  95 - 110 mmol/L Final    CO2 03/21/2025 19 (L)  23 - 29 mmol/L Final    Glucose 03/21/2025 102  70 - 110 mg/dL Final    BUN 03/21/2025 25 (H)  8 - 23 mg/dL Final    Creatinine 03/21/2025 0.9  0.5 - 1.4 mg/dL Final    Calcium 03/21/2025 9.2  8.7 - 10.5 mg/dL Final    Total Protein 03/21/2025 8.3  6.0 - 8.4 g/dL Final    Albumin 03/21/2025 4.0  3.5 - 5.2 g/dL Final    Total Bilirubin 03/21/2025 1.2 (H)  0.1 - 1.0 mg/dL Final    Comment: For infants and newborns, interpretation of results should be based  on gestational age, weight and in agreement with clinical  observations.    Premature Infant recommended reference ranges:  Up to 24 hours.............<8.0 mg/dL  Up to 48 hours............<12.0 mg/dL  3-5 days..................<15.0 mg/dL  6-29 days.................<15.0 mg/dL      Alkaline Phosphatase 03/21/2025 77  40 - 150 U/L Final    AST 03/21/2025 21  10 - 40 U/L Final    ALT 03/21/2025 15  10 - 44 U/L Final    eGFR 03/21/2025 >60  >60 mL/min/1.73 m^2 Final    Anion Gap 03/21/2025 13  8 - 16 mmol/L Final         Imaging:     MRI Abdomen 3/25/25  Evidence partial gastrectomy is noted with gastric pull-through.     A bilobed cystic structure is noted in the right lobe of the liver adjacent to the right renal fossa unchanged since the prior without evidence of enhancement 13 mm. A tiny simple cyst is noted adjacent to the gallbladder the gallbladder fossa in the right lobe as well image 20 sequence 5 of 4 mm stable since the prior     Several stable cystic foci are noted in pancreas as can be seen on 03/15/2024 with several subcentimeter foci identified. Follow-up in 1 year for stability would be reasonable. Serous and mucinous processes are on the differential. No worrisome  evidence of enhancement is noted.     A cystic focus is noted within the right kidney stable since the prior the midportion of 5 mm unchanged     No intrahepatic biliary dilatation is noted. The common bile duct appears appropriate.     Assessment:       1. History of gastrointestinal stromal tumor (GIST)    2. Gastrointestinal stromal tumor (GIST)    3. Pulmonary nodules    4. Back pain, unspecified back location, unspecified back pain laterality, unspecified chronicity                   Plan:     GIST - Pt with a stage IA GIST found incidentally during partial gastrectomy for gastric sleeve formation 1/27/20  -Tumor measured 0.4x0.4x0.1cm, mitotic rate of 0/5mm2, low grade, 0.8cm from closest margin. [id number]  -PT with metastasis risk of 0%  -MRI Abdomen 9/25/23, 3/15/24, 9/25/24 and 3/25/25 showed JESS  -Will repeat MRI abdomen in 6 months    Pulmonary Nodules - Pt with stable pulmonary nodules on CT chest 7/25/23 and 8/01/24  -Pt seen by Dr Hollingsworth on 4/29/24 and felt pt did not need additional surveillance for pulmonary nodules    Back Pain - in right lower back  -Pt states this has been present for months  -Pt declined additional imaging and appt with orthopedics  [facility]    Route Chart for Scheduling    Med Onc Chart Routing      Follow up with physician 6 months. Pt needs a CBC, CMP and MRI abdomen in 6 months with return appt.   Follow up with ABRIL    Infusion scheduling note    Injection scheduling note    Labs    Imaging    Pharmacy appointment    Other referrals                   Devonte Morales MD  [facility]  Hematology and Oncology  [facility]   [address]   [place] [address]   O: [phone]  F: [phone]

## 2025-03-27 ENCOUNTER — OFFICE VISIT (OUTPATIENT)
Dept: HEMATOLOGY/ONCOLOGY | Facility: CLINIC | Age: 75
End: 2025-03-27
Payer: MEDICARE

## 2025-03-27 VITALS
TEMPERATURE: 98 F | HEIGHT: 63 IN | OXYGEN SATURATION: 97 % | SYSTOLIC BLOOD PRESSURE: 116 MMHG | WEIGHT: 210.13 LBS | BODY MASS INDEX: 37.23 KG/M2 | RESPIRATION RATE: 16 BRPM | HEART RATE: 88 BPM | DIASTOLIC BLOOD PRESSURE: 88 MMHG

## 2025-03-27 DIAGNOSIS — C49.A0 GASTROINTESTINAL STROMAL TUMOR (GIST): ICD-10-CM

## 2025-03-27 DIAGNOSIS — M54.9 BACK PAIN, UNSPECIFIED BACK LOCATION, UNSPECIFIED BACK PAIN LATERALITY, UNSPECIFIED CHRONICITY: ICD-10-CM

## 2025-03-27 DIAGNOSIS — R91.8 PULMONARY NODULES: ICD-10-CM

## 2025-03-27 DIAGNOSIS — Z85.09 HISTORY OF GASTROINTESTINAL STROMAL TUMOR (GIST): Primary | ICD-10-CM

## 2025-03-27 PROCEDURE — 99999 PR PBB SHADOW E&M-EST. PATIENT-LVL IV: CPT | Mod: PBBFAC,,, | Performed by: INTERNAL MEDICINE

## 2025-04-08 DIAGNOSIS — I25.10 ATHEROSCLEROSIS OF NATIVE CORONARY ARTERY OF NATIVE HEART WITHOUT ANGINA PECTORIS: ICD-10-CM

## 2025-04-08 DIAGNOSIS — I25.2 HISTORY OF NON-ST ELEVATION MYOCARDIAL INFARCTION (NSTEMI): ICD-10-CM

## 2025-04-08 DIAGNOSIS — E78.5 DYSLIPIDEMIA: ICD-10-CM

## 2025-04-08 DIAGNOSIS — I10 ESSENTIAL HYPERTENSION: ICD-10-CM

## 2025-04-08 DIAGNOSIS — Z95.5 STENTED CORONARY ARTERY: Chronic | ICD-10-CM

## 2025-04-08 RX ORDER — CARVEDILOL 6.25 MG/1
6.25 TABLET ORAL
Qty: 180 TABLET | Refills: 4 | Status: SHIPPED | OUTPATIENT
Start: 2025-04-08

## 2025-05-13 ENCOUNTER — OFFICE VISIT (OUTPATIENT)
Dept: FAMILY MEDICINE | Facility: CLINIC | Age: 75
End: 2025-05-13
Payer: MEDICARE

## 2025-05-13 VITALS
WEIGHT: 216.5 LBS | OXYGEN SATURATION: 98 % | DIASTOLIC BLOOD PRESSURE: 76 MMHG | SYSTOLIC BLOOD PRESSURE: 122 MMHG | HEIGHT: 63 IN | BODY MASS INDEX: 38.36 KG/M2 | HEART RATE: 79 BPM

## 2025-05-13 DIAGNOSIS — I10 ESSENTIAL HYPERTENSION: ICD-10-CM

## 2025-05-13 DIAGNOSIS — Z95.5 STENTED CORONARY ARTERY: Chronic | ICD-10-CM

## 2025-05-13 DIAGNOSIS — Z90.3 S/P GASTRIC SLEEVE PROCEDURE: ICD-10-CM

## 2025-05-13 DIAGNOSIS — Z00.00 ENCOUNTER FOR MEDICARE ANNUAL WELLNESS EXAM: Primary | ICD-10-CM

## 2025-05-13 DIAGNOSIS — E03.9 ACQUIRED HYPOTHYROIDISM: ICD-10-CM

## 2025-05-13 DIAGNOSIS — C49.A0 GASTROINTESTINAL STROMAL TUMOR (GIST): ICD-10-CM

## 2025-05-13 DIAGNOSIS — E78.5 DYSLIPIDEMIA: ICD-10-CM

## 2025-05-13 DIAGNOSIS — I25.10 ATHEROSCLEROSIS OF NATIVE CORONARY ARTERY OF NATIVE HEART WITHOUT ANGINA PECTORIS: ICD-10-CM

## 2025-05-13 DIAGNOSIS — I70.0 AORTIC ATHEROSCLEROSIS: ICD-10-CM

## 2025-05-13 PROCEDURE — 99999 PR PBB SHADOW E&M-EST. PATIENT-LVL IV: CPT | Mod: PBBFAC,,, | Performed by: NURSE PRACTITIONER

## 2025-05-13 PROCEDURE — 1101F PT FALLS ASSESS-DOCD LE1/YR: CPT | Mod: CPTII,S$GLB,, | Performed by: NURSE PRACTITIONER

## 2025-05-13 PROCEDURE — 1126F AMNT PAIN NOTED NONE PRSNT: CPT | Mod: CPTII,S$GLB,, | Performed by: NURSE PRACTITIONER

## 2025-05-13 PROCEDURE — 1160F RVW MEDS BY RX/DR IN RCRD: CPT | Mod: CPTII,S$GLB,, | Performed by: NURSE PRACTITIONER

## 2025-05-13 PROCEDURE — 1159F MED LIST DOCD IN RCRD: CPT | Mod: CPTII,S$GLB,, | Performed by: NURSE PRACTITIONER

## 2025-05-13 PROCEDURE — 3074F SYST BP LT 130 MM HG: CPT | Mod: CPTII,S$GLB,, | Performed by: NURSE PRACTITIONER

## 2025-05-13 PROCEDURE — 1158F ADVNC CARE PLAN TLK DOCD: CPT | Mod: CPTII,S$GLB,, | Performed by: NURSE PRACTITIONER

## 2025-05-13 PROCEDURE — G0439 PPPS, SUBSEQ VISIT: HCPCS | Mod: S$GLB,,, | Performed by: NURSE PRACTITIONER

## 2025-05-13 PROCEDURE — 3078F DIAST BP <80 MM HG: CPT | Mod: CPTII,S$GLB,, | Performed by: NURSE PRACTITIONER

## 2025-05-13 PROCEDURE — 3288F FALL RISK ASSESSMENT DOCD: CPT | Mod: CPTII,S$GLB,, | Performed by: NURSE PRACTITIONER

## 2025-05-13 NOTE — PATIENT INSTRUCTIONS
Counseling and Referral of Other Preventative  (Italic type indicates deductible and co-insurance are waived)    Patient Name: Nancy Figueroa  Today's Date: 5/13/2025    Health Maintenance       Date Due Completion Date    Shingles Vaccine (1 of 2) Never done ---    RSV Vaccine (Age 60+ and Pregnant patients) (1 - Risk 60-74 years 1-dose series) Never done ---    COVID-19 Vaccine (5 - 2024-25 season) 09/01/2024 6/27/2022    Mammogram 11/21/2025 11/21/2024    Override on 10/18/2012: (N/S)    High Dose Statin 03/27/2026 3/27/2025    Aspirin/Antiplatelet Therapy 03/27/2026 3/27/2025    TETANUS VACCINE 03/06/2027 3/6/2017    DEXA Scan 08/20/2027 8/20/2024    Lipid Panel 07/25/2028 7/25/2023    Colorectal Cancer Screening 01/25/2032 1/25/2022    Override on 10/18/2012: (N/S)        No orders of the defined types were placed in this encounter.    The following information is provided to all patients.  This information is to help you find resources for any of the problems found today that may be affecting your health:                  Living healthy guide: www.[facility].[place].gov      Understanding Diabetes: www.diabetes.org      Eating healthy: www.cdc.gov/healthyweight      [facility] home safety checklist: www.cdc.gov/steadi/patient.html      Agency on Aging: www.goea.[place].[facility]      Alcoholics anonymous (AA): www.aa.org      Physical Activity: www.vanessa.nih.gov/lm9pqyu      Tobacco use: www.quitwithusla.org

## 2025-05-22 NOTE — PROGRESS NOTES
"  Nancy Figueroa presented for an initial Medicare AWV today. The following components were reviewed and updated:    Medical history  Family History  Social history  Allergies and Current Medications  Health Risk Assessment  Health Maintenance  Care Team    **See Completed Assessments for Annual Wellness visit with in the encounter summary    The following assessments were completed:  Depression Screening  Cognitive function Screening    Timed Get Up Test  Whisper Test      Opioid documentation:      Patient does not have a current opioid prescription.          Vitals:    05/13/25 0754   BP: 122/76   BP Location: Left arm   Patient Position: Sitting   Pulse: 79   SpO2: 98%   Weight: 98.2 kg (216 lb 7.9 oz)   Height: 5' 3" (1.6 m)     Body mass index is 38.35 kg/m².       Physical Exam  Vitals reviewed.   HENT:      Head: Normocephalic.   Cardiovascular:      Rate and Rhythm: Normal rate.   Pulmonary:      Effort: Pulmonary effort is normal. No respiratory distress.   Skin:     General: Skin is warm.   Neurological:      General: No focal deficit present.      Mental Status: She is alert.           Diagnoses and health risks identified today and associated recommendations/orders:  1. Encounter for Medicare annual wellness exam  Reviewed health maintenance and provided recommendations   Recommend rsv vaccine   - Referral to Enhanced Annual Wellness Visit (eAWV) W+1    2. Aortic atherosclerosis  Continue to monitor  Followed by Dr Mckeon.      3. Atherosclerosis of native coronary artery of native heart without angina pectoris  Continue to monitor  Followed by Dr Linda Epperson CP.      4. Dyslipidemia  Continue to monitor  Followed by KAREEM Pantoja MD .      5. Essential hypertension  Continue to monitor  Followed by KAREEM Pantoja MD .      6. Stented coronary artery  Continue to monitor  Followed by Dr Linda Epperson CP.      7. Gastrointestinal stromal tumor (GIST)  Continue to monitor  Followed by Dr" Arlette.      8. Acquired hypothyroidism  Continue to monitor  Followed by KAREEM Pantoja MD .      9. S/P gastric sleeve procedure  Continue to monitor  Followed by KAREEM Pantoja MD .        Provided Nancy with a 5-10 year written screening schedule and personal prevention plan. Recommendations were developed using the USPSTF age appropriate recommendations. Education, counseling, and referrals were provided as needed.  After Visit Summary printed and given to patient which includes a list of additional screenings\tests needed.    No follow-ups on file.      Juliette Marquez NP

## 2025-07-07 ENCOUNTER — TELEPHONE (OUTPATIENT)
Dept: PHARMACY | Facility: CLINIC | Age: 75
End: 2025-07-07
Payer: MEDICARE

## 2025-07-07 NOTE — TELEPHONE ENCOUNTER
[facility]/Population Health Chart Review & Patient Outreach Details For Medication Adherence Project    Reason for Outreach Encounter: 3rd Party payor non-compliance report ([employer], [employer], UHC, etc)  2.  Patient Outreach Method: Reviewed patient chart  and [employer] message  3.   Medication in question:    Hyperlipidemia Medications              atorvastatin (LIPITOR) 10 MG tablet Take 1 tablet (10 mg total) by mouth once daily.                  LF 90 ds 4/10/25    4.  Reviewed and or Updates Made To: Patient Chart  5. Outreach Outcomes and/or actions taken: Patient filled medication and is on track to be adherent and Patient reminded to  prescription  Additional Notes:

## 2025-07-08 DIAGNOSIS — E03.9 HYPOTHYROIDISM, UNSPECIFIED TYPE: ICD-10-CM

## 2025-07-08 RX ORDER — LEVOTHYROXINE SODIUM 112 UG/1
112 TABLET ORAL
Qty: 90 TABLET | Refills: 0 | Status: SHIPPED | OUTPATIENT
Start: 2025-07-08

## 2025-07-08 NOTE — TELEPHONE ENCOUNTER
No care due was identified.  [facility] Embedded Care Due Messages. Reference number: [id number].   7/08/2025 5:55:50 AM CDT

## 2025-07-15 ENCOUNTER — LAB VISIT (OUTPATIENT)
Dept: LAB | Facility: HOSPITAL | Age: 75
End: 2025-07-15
Attending: FAMILY MEDICINE
Payer: MEDICARE

## 2025-07-15 ENCOUNTER — OFFICE VISIT (OUTPATIENT)
Dept: FAMILY MEDICINE | Facility: CLINIC | Age: 75
End: 2025-07-15
Payer: MEDICARE

## 2025-07-15 VITALS
SYSTOLIC BLOOD PRESSURE: 118 MMHG | WEIGHT: 223.13 LBS | HEART RATE: 100 BPM | OXYGEN SATURATION: 96 % | BODY MASS INDEX: 39.54 KG/M2 | DIASTOLIC BLOOD PRESSURE: 78 MMHG | HEIGHT: 63 IN

## 2025-07-15 DIAGNOSIS — I10 ESSENTIAL HYPERTENSION: ICD-10-CM

## 2025-07-15 DIAGNOSIS — E03.9 ACQUIRED HYPOTHYROIDISM: ICD-10-CM

## 2025-07-15 DIAGNOSIS — I25.2 HISTORY OF NON-ST ELEVATION MYOCARDIAL INFARCTION (NSTEMI): ICD-10-CM

## 2025-07-15 DIAGNOSIS — Z90.3 S/P GASTRIC SLEEVE PROCEDURE: ICD-10-CM

## 2025-07-15 DIAGNOSIS — J30.1 SEASONAL ALLERGIC RHINITIS DUE TO POLLEN: ICD-10-CM

## 2025-07-15 DIAGNOSIS — L91.8 CUTANEOUS SKIN TAGS: Primary | ICD-10-CM

## 2025-07-15 DIAGNOSIS — C49.A0 GASTROINTESTINAL STROMAL TUMOR (GIST): ICD-10-CM

## 2025-07-15 DIAGNOSIS — K21.9 GASTROESOPHAGEAL REFLUX DISEASE, UNSPECIFIED WHETHER ESOPHAGITIS PRESENT: ICD-10-CM

## 2025-07-15 LAB
T4 FREE SERPL-MCNC: 1.2 NG/DL (ref 0.71–1.51)
TSH SERPL-ACNC: 2.89 UIU/ML (ref 0.4–4)

## 2025-07-15 PROCEDURE — 1159F MED LIST DOCD IN RCRD: CPT | Mod: CPTII,S$GLB,, | Performed by: FAMILY MEDICINE

## 2025-07-15 PROCEDURE — 36415 COLL VENOUS BLD VENIPUNCTURE: CPT | Mod: PO

## 2025-07-15 PROCEDURE — 1160F RVW MEDS BY RX/DR IN RCRD: CPT | Mod: CPTII,S$GLB,, | Performed by: FAMILY MEDICINE

## 2025-07-15 PROCEDURE — 84439 ASSAY OF FREE THYROXINE: CPT

## 2025-07-15 PROCEDURE — 99999 PR PBB SHADOW E&M-EST. PATIENT-LVL III: CPT | Mod: PBBFAC,,, | Performed by: FAMILY MEDICINE

## 2025-07-15 PROCEDURE — 99214 OFFICE O/P EST MOD 30 MIN: CPT | Mod: S$GLB,,, | Performed by: FAMILY MEDICINE

## 2025-07-15 PROCEDURE — 3074F SYST BP LT 130 MM HG: CPT | Mod: CPTII,S$GLB,, | Performed by: FAMILY MEDICINE

## 2025-07-15 PROCEDURE — 3078F DIAST BP <80 MM HG: CPT | Mod: CPTII,S$GLB,, | Performed by: FAMILY MEDICINE

## 2025-07-15 PROCEDURE — 84443 ASSAY THYROID STIM HORMONE: CPT

## 2025-07-15 PROCEDURE — 1101F PT FALLS ASSESS-DOCD LE1/YR: CPT | Mod: CPTII,S$GLB,, | Performed by: FAMILY MEDICINE

## 2025-07-15 PROCEDURE — 3288F FALL RISK ASSESSMENT DOCD: CPT | Mod: CPTII,S$GLB,, | Performed by: FAMILY MEDICINE

## 2025-07-15 PROCEDURE — 1126F AMNT PAIN NOTED NONE PRSNT: CPT | Mod: CPTII,S$GLB,, | Performed by: FAMILY MEDICINE

## 2025-07-15 PROCEDURE — G2211 COMPLEX E/M VISIT ADD ON: HCPCS | Mod: S$GLB,,, | Performed by: FAMILY MEDICINE

## 2025-07-15 PROCEDURE — 3008F BODY MASS INDEX DOCD: CPT | Mod: CPTII,S$GLB,, | Performed by: FAMILY MEDICINE

## 2025-07-15 RX ORDER — FLUTICASONE PROPIONATE 50 MCG
1 SPRAY, SUSPENSION (ML) NASAL 2 TIMES DAILY
Qty: 16 ML | Refills: 3 | Status: SHIPPED | OUTPATIENT
Start: 2025-07-15

## 2025-07-15 RX ORDER — PANTOPRAZOLE SODIUM 40 MG/1
40 TABLET, DELAYED RELEASE ORAL DAILY
Qty: 90 TABLET | Refills: 3 | Status: SHIPPED | OUTPATIENT
Start: 2025-07-15

## 2025-07-15 NOTE — PROGRESS NOTES
Subjective:       Patient ID: Nancy Figueroa is a 75 y.o. female.    Chief Complaint: Annual Exam    Pt is known to me.   Pt is here for followup of chronic medical issues.  The pt needs to see derm for skin tags.  She wants to see the derm her  sees in [place].  She is gaining weight.  She is s/p gastric sleeve in 2019.  She has stopped using WW and is gaining weight.  She continues with Dr. Morales for GIST--she is stable.  She has good BP control.  She follows with Dr. Taylor.  Discussed health maintenance with pt and will schedule appropriate studies and/or immunizations.        Review of Systems    Objective:      Physical Exam  Vitals and nursing note reviewed.   Constitutional:       Appearance: Normal appearance. She is well-developed. She is obese. She is not ill-appearing.   HENT:      Head: Normocephalic.   Eyes:      Conjunctiva/sclera: Conjunctivae normal.      Pupils: Pupils are equal, round, and reactive to light.   Neck:      Thyroid: No thyromegaly.   Cardiovascular:      Rate and Rhythm: Normal rate and regular rhythm.      Pulses: Normal pulses.      Heart sounds: Normal heart sounds.   Pulmonary:      Effort: Pulmonary effort is normal.      Breath sounds: Normal breath sounds.   Abdominal:      General: Bowel sounds are normal.      Palpations: Abdomen is soft.      Tenderness: There is no abdominal tenderness.   Musculoskeletal:         General: No tenderness or deformity. Normal range of motion.      Cervical back: Normal range of motion and neck supple.      Right lower leg: No edema.      Left lower leg: No edema.   Lymphadenopathy:      Cervical: No cervical adenopathy.   Skin:     General: Skin is warm and dry.   Neurological:      Mental Status: She is alert and oriented to person, place, and time.      Cranial Nerves: No cranial nerve deficit.      Motor: No abnormal muscle tone.      Coordination: Coordination normal.      Deep Tendon Reflexes: Reflexes normal.   Psychiatric:          Behavior: Behavior normal.         Assessment:       1. Cutaneous skin tags    2. Acquired hypothyroidism    3. Seasonal allergic rhinitis due to pollen    4. Gastroesophageal reflux disease, unspecified whether esophagitis present    5. Essential hypertension    6. History of non-ST elevation myocardial infarction (NSTEMI)    7. Gastrointestinal stromal tumor (GIST)    8. S/P gastric sleeve procedure    9. BMI 40.0-44.9, adult        Plan:       Nancy was seen today for annual exam.    Diagnoses and all orders for this visit:    Cutaneous skin tags  -     Ambulatory referral/consult to Dermatology; Future    Acquired hypothyroidism  -     TSH; Future  -     T4, Free; Future    Seasonal allergic rhinitis due to pollen  -     fluticasone propionate (FLONASE ALLERGY RELIEF) 50 mcg/actuation nasal spray; 1 spray (50 mcg total) by Each Nostril route 2 (two) times daily.    Gastroesophageal reflux disease, unspecified whether esophagitis present  -     pantoprazole (PROTONIX) 40 MG tablet; Take 1 tablet (40 mg total) by mouth once daily.    Essential hypertension    History of non-ST elevation myocardial infarction (NSTEMI)    Gastrointestinal stromal tumor (GIST)    S/P gastric sleeve procedure    BMI 40.0-44.9, adult      During this visit, I reviewed the pt's history, medications, allergies, and problem list.    Visit today included increased complexity associated with the care of the episodic problem skin tags addressed and managing the longitudinal care of the patient due to the serious and/or complex managed problem(s) obesity, GIST, HTN, GERD.